# Patient Record
Sex: MALE | Race: WHITE | NOT HISPANIC OR LATINO | Employment: OTHER | URBAN - NONMETROPOLITAN AREA
[De-identification: names, ages, dates, MRNs, and addresses within clinical notes are randomized per-mention and may not be internally consistent; named-entity substitution may affect disease eponyms.]

---

## 2018-01-24 ENCOUNTER — DOCUMENTATION ONLY (OUTPATIENT)
Dept: FAMILY MEDICINE | Facility: OTHER | Age: 78
End: 2018-01-24

## 2018-01-24 RX ORDER — AMLODIPINE BESYLATE 10 MG/1
1 TABLET ORAL DAILY
COMMUNITY
Start: 2016-03-01 | End: 2024-01-01

## 2018-01-24 RX ORDER — ATORVASTATIN CALCIUM 20 MG/1
1 TABLET, FILM COATED ORAL DAILY
COMMUNITY
End: 2024-01-01

## 2018-01-24 RX ORDER — HYDROCHLOROTHIAZIDE 25 MG/1
1 TABLET ORAL DAILY
COMMUNITY
Start: 2015-04-01 | End: 2024-01-01

## 2018-01-24 RX ORDER — NITROGLYCERIN 0.4 MG/1
1 TABLET SUBLINGUAL EVERY 5 MIN PRN
COMMUNITY
Start: 2015-03-04 | End: 2024-01-01

## 2018-01-24 RX ORDER — OXYCODONE AND ACETAMINOPHEN 5; 325 MG/1; MG/1
1-2 TABLET ORAL EVERY 4 HOURS PRN
COMMUNITY
Start: 2006-03-24 | End: 2024-01-01

## 2018-01-24 RX ORDER — SPIRONOLACTONE 25 MG/1
1 TABLET ORAL DAILY
COMMUNITY
Start: 2015-03-04 | End: 2024-01-01

## 2018-01-24 RX ORDER — LISINOPRIL 40 MG/1
1 TABLET ORAL DAILY
COMMUNITY
End: 2024-01-01

## 2018-01-24 RX ORDER — WARFARIN SODIUM 2.5 MG/1
TABLET ORAL
COMMUNITY
Start: 2006-03-24 | End: 2024-01-01

## 2018-01-24 RX ORDER — ATENOLOL 100 MG/1
1 TABLET ORAL DAILY
COMMUNITY
End: 2024-01-01

## 2018-07-24 ENCOUNTER — TELEPHONE (OUTPATIENT)
Dept: INTERNAL MEDICINE | Facility: OTHER | Age: 78
End: 2018-07-24

## 2024-01-01 ENCOUNTER — APPOINTMENT (OUTPATIENT)
Dept: ULTRASOUND IMAGING | Facility: CLINIC | Age: 84
DRG: 682 | End: 2024-01-01
Attending: HOSPITALIST
Payer: MEDICARE

## 2024-01-01 ENCOUNTER — APPOINTMENT (OUTPATIENT)
Dept: PHYSICAL THERAPY | Facility: CLINIC | Age: 84
DRG: 378 | End: 2024-01-01
Attending: HOSPITALIST
Payer: MEDICARE

## 2024-01-01 ENCOUNTER — HEALTH MAINTENANCE LETTER (OUTPATIENT)
Age: 84
End: 2024-01-01

## 2024-01-01 ENCOUNTER — HOSPITAL ENCOUNTER (INPATIENT)
Facility: CLINIC | Age: 84
LOS: 2 days | Discharge: HOME | DRG: 378 | End: 2024-08-13
Attending: EMERGENCY MEDICINE | Admitting: INTERNAL MEDICINE
Payer: MEDICARE

## 2024-01-01 ENCOUNTER — HOSPITAL ENCOUNTER (INPATIENT)
Facility: CLINIC | Age: 84
LOS: 2 days | Discharge: HOME | DRG: 378 | End: 2024-02-05
Attending: EMERGENCY MEDICINE | Admitting: INTERNAL MEDICINE
Payer: MEDICARE

## 2024-01-01 ENCOUNTER — ANESTHESIA (OUTPATIENT)
Dept: SURGERY | Facility: CLINIC | Age: 84
DRG: 378 | End: 2024-01-01
Payer: MEDICARE

## 2024-01-01 ENCOUNTER — HOSPITAL ENCOUNTER (INPATIENT)
Facility: CLINIC | Age: 84
LOS: 3 days | Discharge: HOME | DRG: 378 | End: 2024-02-12
Attending: STUDENT IN AN ORGANIZED HEALTH CARE EDUCATION/TRAINING PROGRAM | Admitting: INTERNAL MEDICINE
Payer: MEDICARE

## 2024-01-01 ENCOUNTER — HOSPITAL ENCOUNTER (INPATIENT)
Facility: CLINIC | Age: 84
LOS: 5 days | Discharge: HOME | DRG: 682 | End: 2024-04-09
Attending: EMERGENCY MEDICINE | Admitting: STUDENT IN AN ORGANIZED HEALTH CARE EDUCATION/TRAINING PROGRAM
Payer: MEDICARE

## 2024-01-01 ENCOUNTER — APPOINTMENT (OUTPATIENT)
Dept: GENERAL RADIOLOGY | Facility: CLINIC | Age: 84
DRG: 378 | End: 2024-01-01
Attending: BEHAVIOR TECHNICIAN
Payer: MEDICARE

## 2024-01-01 ENCOUNTER — ANESTHESIA EVENT (OUTPATIENT)
Dept: SURGERY | Facility: CLINIC | Age: 84
DRG: 378 | End: 2024-01-01
Payer: MEDICARE

## 2024-01-01 ENCOUNTER — APPOINTMENT (OUTPATIENT)
Dept: PHYSICAL THERAPY | Facility: CLINIC | Age: 84
DRG: 378 | End: 2024-01-01
Payer: MEDICARE

## 2024-01-01 ENCOUNTER — APPOINTMENT (OUTPATIENT)
Dept: CT IMAGING | Facility: CLINIC | Age: 84
DRG: 378 | End: 2024-01-01
Attending: EMERGENCY MEDICINE
Payer: MEDICARE

## 2024-01-01 VITALS
HEIGHT: 68 IN | DIASTOLIC BLOOD PRESSURE: 81 MMHG | SYSTOLIC BLOOD PRESSURE: 148 MMHG | TEMPERATURE: 98.4 F | BODY MASS INDEX: 21.45 KG/M2 | OXYGEN SATURATION: 94 % | HEART RATE: 85 BPM | WEIGHT: 141.54 LBS | RESPIRATION RATE: 20 BRPM

## 2024-01-01 VITALS
TEMPERATURE: 98.9 F | SYSTOLIC BLOOD PRESSURE: 161 MMHG | WEIGHT: 151.36 LBS | DIASTOLIC BLOOD PRESSURE: 96 MMHG | HEART RATE: 88 BPM | OXYGEN SATURATION: 92 % | HEIGHT: 68 IN | BODY MASS INDEX: 22.94 KG/M2 | RESPIRATION RATE: 16 BRPM

## 2024-01-01 VITALS
TEMPERATURE: 97.9 F | HEIGHT: 72 IN | BODY MASS INDEX: 19.59 KG/M2 | DIASTOLIC BLOOD PRESSURE: 74 MMHG | RESPIRATION RATE: 15 BRPM | OXYGEN SATURATION: 94 % | SYSTOLIC BLOOD PRESSURE: 124 MMHG | WEIGHT: 144.62 LBS | HEART RATE: 87 BPM

## 2024-01-01 VITALS
OXYGEN SATURATION: 94 % | HEIGHT: 69 IN | RESPIRATION RATE: 18 BRPM | TEMPERATURE: 97.6 F | DIASTOLIC BLOOD PRESSURE: 65 MMHG | HEART RATE: 77 BPM | BODY MASS INDEX: 22.47 KG/M2 | SYSTOLIC BLOOD PRESSURE: 123 MMHG | WEIGHT: 151.68 LBS

## 2024-01-01 DIAGNOSIS — N13.9 URINARY OBSTRUCTION: ICD-10-CM

## 2024-01-01 DIAGNOSIS — D72.829 LEUKOCYTOSIS: ICD-10-CM

## 2024-01-01 DIAGNOSIS — K26.4 GASTROINTESTINAL HEMORRHAGE ASSOCIATED WITH DUODENAL ULCER: Primary | ICD-10-CM

## 2024-01-01 DIAGNOSIS — K92.2 ACUTE GI BLEEDING: ICD-10-CM

## 2024-01-01 DIAGNOSIS — R53.83 FATIGUE: ICD-10-CM

## 2024-01-01 DIAGNOSIS — I51.7 CARDIOMEGALY: ICD-10-CM

## 2024-01-01 DIAGNOSIS — D64.9 ANEMIA, UNSPECIFIED TYPE: ICD-10-CM

## 2024-01-01 DIAGNOSIS — R07.89 CHEST TIGHTNESS: ICD-10-CM

## 2024-01-01 DIAGNOSIS — K92.1 MELENA: ICD-10-CM

## 2024-01-01 DIAGNOSIS — E87.5 HYPERKALEMIA: ICD-10-CM

## 2024-01-01 DIAGNOSIS — K92.2 GASTROINTESTINAL HEMORRHAGE, UNSPECIFIED GASTROINTESTINAL HEMORRHAGE TYPE: ICD-10-CM

## 2024-01-01 DIAGNOSIS — N18.4 CKD (CHRONIC KIDNEY DISEASE) STAGE 4, GFR 15-29 ML/MIN (H): ICD-10-CM

## 2024-01-01 DIAGNOSIS — I48.20 CHRONIC ATRIAL FIBRILLATION (H): ICD-10-CM

## 2024-01-01 DIAGNOSIS — D62 ANEMIA DUE TO BLOOD LOSS, ACUTE: ICD-10-CM

## 2024-01-01 DIAGNOSIS — R79.89 ELEVATED TROPONIN: ICD-10-CM

## 2024-01-01 DIAGNOSIS — K92.1 MELENA: Primary | ICD-10-CM

## 2024-01-01 DIAGNOSIS — N17.9 AKI (ACUTE KIDNEY INJURY) (H): ICD-10-CM

## 2024-01-01 DIAGNOSIS — R26.89 BALANCE DISORDER: ICD-10-CM

## 2024-01-01 DIAGNOSIS — R79.89 ELEVATED SERUM CREATININE: ICD-10-CM

## 2024-01-01 LAB
ABO/RH(D): NORMAL
ADV 40+41 DNA STL QL NAA+NON-PROBE: NEGATIVE
ALBUMIN SERPL BCG-MCNC: 3.1 G/DL (ref 3.5–5.2)
ALBUMIN SERPL BCG-MCNC: 3.4 G/DL (ref 3.5–5.2)
ALBUMIN SERPL BCG-MCNC: 3.6 G/DL (ref 3.5–5.2)
ALBUMIN SERPL BCG-MCNC: 3.7 G/DL (ref 3.5–5.2)
ALBUMIN UR-MCNC: 10 MG/DL
ALBUMIN UR-MCNC: NEGATIVE MG/DL
ALP SERPL-CCNC: 47 U/L (ref 40–150)
ALP SERPL-CCNC: 73 U/L (ref 40–150)
ALP SERPL-CCNC: 76 U/L (ref 40–150)
ALT SERPL W P-5'-P-CCNC: 13 U/L (ref 0–70)
ALT SERPL W P-5'-P-CCNC: 13 U/L (ref 0–70)
ALT SERPL W P-5'-P-CCNC: 6 U/L (ref 0–70)
ANION GAP SERPL CALCULATED.3IONS-SCNC: 10 MMOL/L (ref 7–15)
ANION GAP SERPL CALCULATED.3IONS-SCNC: 11 MMOL/L (ref 7–15)
ANION GAP SERPL CALCULATED.3IONS-SCNC: 12 MMOL/L (ref 7–15)
ANION GAP SERPL CALCULATED.3IONS-SCNC: 13 MMOL/L (ref 7–15)
ANION GAP SERPL CALCULATED.3IONS-SCNC: 14 MMOL/L (ref 7–15)
ANION GAP SERPL CALCULATED.3IONS-SCNC: 16 MMOL/L (ref 7–15)
ANION GAP SERPL CALCULATED.3IONS-SCNC: 17 MMOL/L (ref 7–15)
ANION GAP SERPL CALCULATED.3IONS-SCNC: 19 MMOL/L (ref 7–15)
ANION GAP SERPL CALCULATED.3IONS-SCNC: 9 MMOL/L (ref 7–15)
ANION GAP SERPL CALCULATED.3IONS-SCNC: 9 MMOL/L (ref 7–15)
ANTIBODY SCREEN: NEGATIVE
APPEARANCE UR: ABNORMAL
APPEARANCE UR: CLEAR
APTT PPP: 26 SECONDS (ref 22–38)
AST SERPL W P-5'-P-CCNC: 10 U/L (ref 0–45)
AST SERPL W P-5'-P-CCNC: 12 U/L (ref 0–45)
AST SERPL W P-5'-P-CCNC: 13 U/L (ref 0–45)
ASTRO TYP 1-8 RNA STL QL NAA+NON-PROBE: NEGATIVE
ATRIAL RATE - MUSE: 76 BPM
ATRIAL RATE - MUSE: 84 BPM
ATRIAL RATE - MUSE: NORMAL BPM
BACTERIA #/AREA URNS HPF: ABNORMAL /HPF
BASOPHILS # BLD AUTO: 0 10E3/UL (ref 0–0.2)
BASOPHILS # BLD AUTO: 0.1 10E3/UL (ref 0–0.2)
BASOPHILS # BLD AUTO: 0.1 10E3/UL (ref 0–0.2)
BASOPHILS NFR BLD AUTO: 0 %
BASOPHILS NFR BLD AUTO: 1 %
BASOPHILS NFR BLD AUTO: 1 %
BILIRUB SERPL-MCNC: 0.3 MG/DL
BILIRUB SERPL-MCNC: 0.3 MG/DL
BILIRUB SERPL-MCNC: <0.2 MG/DL
BILIRUB UR QL STRIP: NEGATIVE
BILIRUB UR QL STRIP: NEGATIVE
BLD PROD TYP BPU: NORMAL
BLOOD COMPONENT TYPE: NORMAL
BUN SERPL-MCNC: 38.6 MG/DL (ref 8–23)
BUN SERPL-MCNC: 39.9 MG/DL (ref 8–23)
BUN SERPL-MCNC: 40.9 MG/DL (ref 8–23)
BUN SERPL-MCNC: 42.3 MG/DL (ref 8–23)
BUN SERPL-MCNC: 43.4 MG/DL (ref 8–23)
BUN SERPL-MCNC: 44 MG/DL (ref 8–23)
BUN SERPL-MCNC: 61.3 MG/DL (ref 8–23)
BUN SERPL-MCNC: 63.1 MG/DL (ref 8–23)
BUN SERPL-MCNC: 64.6 MG/DL (ref 8–23)
BUN SERPL-MCNC: 66.4 MG/DL (ref 8–23)
BUN SERPL-MCNC: 67.5 MG/DL (ref 8–23)
BUN SERPL-MCNC: 71 MG/DL (ref 8–23)
BUN SERPL-MCNC: 76.1 MG/DL (ref 8–23)
C CAYETANENSIS DNA STL QL NAA+NON-PROBE: NEGATIVE
C DIFF TOX B STL QL: NEGATIVE
CALCIUM SERPL-MCNC: 8 MG/DL (ref 8.8–10.2)
CALCIUM SERPL-MCNC: 8 MG/DL (ref 8.8–10.2)
CALCIUM SERPL-MCNC: 8.2 MG/DL (ref 8.8–10.4)
CALCIUM SERPL-MCNC: 8.3 MG/DL (ref 8.8–10.2)
CALCIUM SERPL-MCNC: 8.3 MG/DL (ref 8.8–10.2)
CALCIUM SERPL-MCNC: 8.3 MG/DL (ref 8.8–10.4)
CALCIUM SERPL-MCNC: 8.4 MG/DL (ref 8.8–10.2)
CALCIUM SERPL-MCNC: 8.6 MG/DL (ref 8.8–10.4)
CALCIUM SERPL-MCNC: 8.8 MG/DL (ref 8.8–10.2)
CALCIUM SERPL-MCNC: 9 MG/DL (ref 8.8–10.2)
CALCIUM SERPL-MCNC: 9.1 MG/DL (ref 8.8–10.2)
CALCIUM SERPL-MCNC: 9.2 MG/DL (ref 8.8–10.2)
CALCIUM SERPL-MCNC: 9.4 MG/DL (ref 8.8–10.2)
CAMPYLOBACTER DNA SPEC NAA+PROBE: NEGATIVE
CHLORIDE SERPL-SCNC: 102 MMOL/L (ref 98–107)
CHLORIDE SERPL-SCNC: 102 MMOL/L (ref 98–107)
CHLORIDE SERPL-SCNC: 103 MMOL/L (ref 98–107)
CHLORIDE SERPL-SCNC: 104 MMOL/L (ref 98–107)
CHLORIDE SERPL-SCNC: 106 MMOL/L (ref 98–107)
CHLORIDE SERPL-SCNC: 106 MMOL/L (ref 98–107)
CHLORIDE SERPL-SCNC: 107 MMOL/L (ref 98–107)
CHLORIDE SERPL-SCNC: 108 MMOL/L (ref 98–107)
CHLORIDE SERPL-SCNC: 109 MMOL/L (ref 98–107)
CHLORIDE SERPL-SCNC: 111 MMOL/L (ref 98–107)
CHLORIDE SERPL-SCNC: 112 MMOL/L (ref 98–107)
CODING SYSTEM: NORMAL
COLOR UR AUTO: ABNORMAL
COLOR UR AUTO: ABNORMAL
COPPER SERPL-MCNC: 146.9 UG/DL
CREAT BLD-MCNC: 1.9 MG/DL (ref 0.7–1.3)
CREAT SERPL-MCNC: 1.28 MG/DL (ref 0.67–1.17)
CREAT SERPL-MCNC: 1.44 MG/DL (ref 0.67–1.17)
CREAT SERPL-MCNC: 1.66 MG/DL (ref 0.67–1.17)
CREAT SERPL-MCNC: 1.67 MG/DL (ref 0.67–1.17)
CREAT SERPL-MCNC: 2.3 MG/DL (ref 0.67–1.17)
CREAT SERPL-MCNC: 2.35 MG/DL (ref 0.67–1.17)
CREAT SERPL-MCNC: 2.5 MG/DL (ref 0.67–1.17)
CREAT SERPL-MCNC: 5.46 MG/DL (ref 0.67–1.17)
CREAT SERPL-MCNC: 5.7 MG/DL (ref 0.67–1.17)
CREAT SERPL-MCNC: 6.09 MG/DL (ref 0.67–1.17)
CREAT SERPL-MCNC: 6.12 MG/DL (ref 0.67–1.17)
CREAT SERPL-MCNC: 6.21 MG/DL (ref 0.67–1.17)
CREAT SERPL-MCNC: 6.22 MG/DL (ref 0.67–1.17)
CREAT SERPL-MCNC: 6.54 MG/DL (ref 0.67–1.17)
CREAT UR-MCNC: 43.8 MG/DL
CROSSMATCH: NORMAL
CRYPTOSP DNA STL QL NAA+NON-PROBE: NEGATIVE
DEPRECATED HCO3 PLAS-SCNC: 16 MMOL/L (ref 22–29)
DEPRECATED HCO3 PLAS-SCNC: 17 MMOL/L (ref 22–29)
DEPRECATED HCO3 PLAS-SCNC: 17 MMOL/L (ref 22–29)
DEPRECATED HCO3 PLAS-SCNC: 18 MMOL/L (ref 22–29)
DEPRECATED HCO3 PLAS-SCNC: 19 MMOL/L (ref 22–29)
DEPRECATED HCO3 PLAS-SCNC: 23 MMOL/L (ref 22–29)
DEPRECATED HCO3 PLAS-SCNC: 25 MMOL/L (ref 22–29)
DIASTOLIC BLOOD PRESSURE - MUSE: NORMAL MMHG
E COLI O157 DNA STL QL NAA+NON-PROBE: ABNORMAL
E HISTOLYT DNA STL QL NAA+NON-PROBE: NEGATIVE
EAEC ASTA GENE ISLT QL NAA+PROBE: NEGATIVE
EC STX1+STX2 GENES STL QL NAA+NON-PROBE: NEGATIVE
EGFRCR SERPLBLD CKD-EPI 2021: 10 ML/MIN/1.73M2
EGFRCR SERPLBLD CKD-EPI 2021: 25 ML/MIN/1.73M2
EGFRCR SERPLBLD CKD-EPI 2021: 26 ML/MIN/1.73M2
EGFRCR SERPLBLD CKD-EPI 2021: 27 ML/MIN/1.73M2
EGFRCR SERPLBLD CKD-EPI 2021: 34 ML/MIN/1.73M2
EGFRCR SERPLBLD CKD-EPI 2021: 40 ML/MIN/1.73M2
EGFRCR SERPLBLD CKD-EPI 2021: 40 ML/MIN/1.73M2
EGFRCR SERPLBLD CKD-EPI 2021: 48 ML/MIN/1.73M2
EGFRCR SERPLBLD CKD-EPI 2021: 55 ML/MIN/1.73M2
EGFRCR SERPLBLD CKD-EPI 2021: 8 ML/MIN/1.73M2
EGFRCR SERPLBLD CKD-EPI 2021: 9 ML/MIN/1.73M2
EOSINOPHIL # BLD AUTO: 0 10E3/UL (ref 0–0.7)
EOSINOPHIL # BLD AUTO: 0.1 10E3/UL (ref 0–0.7)
EOSINOPHIL NFR BLD AUTO: 0 %
EOSINOPHIL SPEC QL WRIGHT STN: NORMAL
EPEC EAE GENE STL QL NAA+NON-PROBE: NEGATIVE
ERYTHROCYTE [DISTWIDTH] IN BLOOD BY AUTOMATED COUNT: 14.3 % (ref 10–15)
ERYTHROCYTE [DISTWIDTH] IN BLOOD BY AUTOMATED COUNT: 14.6 % (ref 10–15)
ERYTHROCYTE [DISTWIDTH] IN BLOOD BY AUTOMATED COUNT: 14.9 % (ref 10–15)
ERYTHROCYTE [DISTWIDTH] IN BLOOD BY AUTOMATED COUNT: 15 % (ref 10–15)
ERYTHROCYTE [DISTWIDTH] IN BLOOD BY AUTOMATED COUNT: 15.5 % (ref 10–15)
ERYTHROCYTE [DISTWIDTH] IN BLOOD BY AUTOMATED COUNT: 15.6 % (ref 10–15)
ERYTHROCYTE [DISTWIDTH] IN BLOOD BY AUTOMATED COUNT: 15.7 % (ref 10–15)
ERYTHROCYTE [DISTWIDTH] IN BLOOD BY AUTOMATED COUNT: 15.9 % (ref 10–15)
ERYTHROCYTE [DISTWIDTH] IN BLOOD BY AUTOMATED COUNT: 16.1 % (ref 10–15)
ERYTHROCYTE [DISTWIDTH] IN BLOOD BY AUTOMATED COUNT: 16.6 % (ref 10–15)
ERYTHROCYTE [DISTWIDTH] IN BLOOD BY AUTOMATED COUNT: 17.5 % (ref 10–15)
ETEC LTA+ST1A+ST1B TOX ST NAA+NON-PROBE: NEGATIVE
FLUAV RNA SPEC QL NAA+PROBE: NEGATIVE
FLUBV RNA RESP QL NAA+PROBE: NEGATIVE
FOLATE SERPL-MCNC: 10.7 NG/ML (ref 4.6–34.8)
FRACT EXCRET NA UR+SERPL-RTO: 6.1 %
G LAMBLIA DNA STL QL NAA+NON-PROBE: NEGATIVE
GLUCOSE BLDC GLUCOMTR-MCNC: 100 MG/DL (ref 70–99)
GLUCOSE BLDC GLUCOMTR-MCNC: 103 MG/DL (ref 70–99)
GLUCOSE BLDC GLUCOMTR-MCNC: 120 MG/DL (ref 70–99)
GLUCOSE BLDC GLUCOMTR-MCNC: 170 MG/DL (ref 70–99)
GLUCOSE BLDC GLUCOMTR-MCNC: 77 MG/DL (ref 70–99)
GLUCOSE BLDC GLUCOMTR-MCNC: 80 MG/DL (ref 70–99)
GLUCOSE BLDC GLUCOMTR-MCNC: 86 MG/DL (ref 70–99)
GLUCOSE BLDC GLUCOMTR-MCNC: 87 MG/DL (ref 70–99)
GLUCOSE BLDC GLUCOMTR-MCNC: 87 MG/DL (ref 70–99)
GLUCOSE BLDC GLUCOMTR-MCNC: 89 MG/DL (ref 70–99)
GLUCOSE SERPL-MCNC: 101 MG/DL (ref 70–99)
GLUCOSE SERPL-MCNC: 102 MG/DL (ref 70–99)
GLUCOSE SERPL-MCNC: 107 MG/DL (ref 70–99)
GLUCOSE SERPL-MCNC: 122 MG/DL (ref 70–99)
GLUCOSE SERPL-MCNC: 123 MG/DL (ref 70–99)
GLUCOSE SERPL-MCNC: 172 MG/DL (ref 70–99)
GLUCOSE SERPL-MCNC: 83 MG/DL (ref 70–99)
GLUCOSE SERPL-MCNC: 88 MG/DL (ref 70–99)
GLUCOSE SERPL-MCNC: 89 MG/DL (ref 70–99)
GLUCOSE SERPL-MCNC: 90 MG/DL (ref 70–99)
GLUCOSE SERPL-MCNC: 91 MG/DL (ref 70–99)
GLUCOSE SERPL-MCNC: 95 MG/DL (ref 70–99)
GLUCOSE SERPL-MCNC: 97 MG/DL (ref 70–99)
GLUCOSE UR STRIP-MCNC: NEGATIVE MG/DL
GLUCOSE UR STRIP-MCNC: NEGATIVE MG/DL
HCO3 SERPL-SCNC: 19 MMOL/L (ref 22–29)
HCO3 SERPL-SCNC: 21 MMOL/L (ref 22–29)
HCO3 SERPL-SCNC: 23 MMOL/L (ref 22–29)
HCT VFR BLD AUTO: 21.9 % (ref 40–53)
HCT VFR BLD AUTO: 23.8 % (ref 40–53)
HCT VFR BLD AUTO: 24.3 % (ref 40–53)
HCT VFR BLD AUTO: 25.8 % (ref 40–53)
HCT VFR BLD AUTO: 26 % (ref 40–53)
HCT VFR BLD AUTO: 26.1 % (ref 40–53)
HCT VFR BLD AUTO: 28 % (ref 40–53)
HCT VFR BLD AUTO: 29.2 % (ref 40–53)
HCT VFR BLD AUTO: 29.6 % (ref 40–53)
HCT VFR BLD AUTO: 30.3 % (ref 40–53)
HCT VFR BLD AUTO: 31.7 % (ref 40–53)
HGB BLD-MCNC: 6.8 G/DL (ref 13.3–17.7)
HGB BLD-MCNC: 6.9 G/DL (ref 13.3–17.7)
HGB BLD-MCNC: 7.1 G/DL (ref 13.3–17.7)
HGB BLD-MCNC: 7.3 G/DL (ref 13.3–17.7)
HGB BLD-MCNC: 7.5 G/DL (ref 13.3–17.7)
HGB BLD-MCNC: 7.5 G/DL (ref 13.3–17.7)
HGB BLD-MCNC: 7.6 G/DL (ref 13.3–17.7)
HGB BLD-MCNC: 7.7 G/DL (ref 13.3–17.7)
HGB BLD-MCNC: 7.8 G/DL (ref 13.3–17.7)
HGB BLD-MCNC: 7.9 G/DL (ref 13.3–17.7)
HGB BLD-MCNC: 8 G/DL (ref 13.3–17.7)
HGB BLD-MCNC: 8 G/DL (ref 13.3–17.7)
HGB BLD-MCNC: 8.1 G/DL (ref 13.3–17.7)
HGB BLD-MCNC: 8.1 G/DL (ref 13.3–17.7)
HGB BLD-MCNC: 8.4 G/DL (ref 13.3–17.7)
HGB BLD-MCNC: 8.5 G/DL (ref 13.3–17.7)
HGB BLD-MCNC: 8.5 G/DL (ref 13.3–17.7)
HGB BLD-MCNC: 8.6 G/DL (ref 13.3–17.7)
HGB BLD-MCNC: 8.7 G/DL (ref 13.3–17.7)
HGB BLD-MCNC: 8.8 G/DL (ref 13.3–17.7)
HGB BLD-MCNC: 8.9 G/DL (ref 13.3–17.7)
HGB BLD-MCNC: 8.9 G/DL (ref 13.3–17.7)
HGB BLD-MCNC: 9 G/DL (ref 13.3–17.7)
HGB BLD-MCNC: 9.1 G/DL (ref 13.3–17.7)
HGB BLD-MCNC: 9.2 G/DL (ref 13.3–17.7)
HGB BLD-MCNC: 9.2 G/DL (ref 13.3–17.7)
HGB BLD-MCNC: 9.6 G/DL (ref 13.3–17.7)
HGB UR QL STRIP: ABNORMAL
HGB UR QL STRIP: NEGATIVE
HOLD SPECIMEN: NORMAL
IMM GRANULOCYTES # BLD: 0.1 10E3/UL
IMM GRANULOCYTES # BLD: 0.2 10E3/UL
IMM GRANULOCYTES # BLD: 0.2 10E3/UL
IMM GRANULOCYTES # BLD: 0.3 10E3/UL
IMM GRANULOCYTES # BLD: 0.4 10E3/UL
IMM GRANULOCYTES NFR BLD: 1 %
IMM GRANULOCYTES NFR BLD: 2 %
IMM GRANULOCYTES NFR BLD: 3 %
INR PPP: 0.95 (ref 0.85–1.15)
INR PPP: 1.18 (ref 0.85–1.15)
INTERPRETATION ECG - MUSE: NORMAL
ISSUE DATE AND TIME: NORMAL
KETONES UR STRIP-MCNC: NEGATIVE MG/DL
KETONES UR STRIP-MCNC: NEGATIVE MG/DL
LACTATE SERPL-SCNC: 1 MMOL/L (ref 0.7–2)
LACTATE SERPL-SCNC: 1.5 MMOL/L (ref 0.7–2)
LACTATE SERPL-SCNC: 2.6 MMOL/L (ref 0.7–2)
LACTATE SERPL-SCNC: 3.4 MMOL/L (ref 0.7–2)
LEUKOCYTE ESTERASE UR QL STRIP: ABNORMAL
LEUKOCYTE ESTERASE UR QL STRIP: NEGATIVE
LYMPHOCYTES # BLD AUTO: 0.5 10E3/UL (ref 0.8–5.3)
LYMPHOCYTES # BLD AUTO: 0.6 10E3/UL (ref 0.8–5.3)
LYMPHOCYTES # BLD AUTO: 0.7 10E3/UL (ref 0.8–5.3)
LYMPHOCYTES # BLD AUTO: 0.7 10E3/UL (ref 0.8–5.3)
LYMPHOCYTES # BLD AUTO: 0.8 10E3/UL (ref 0.8–5.3)
LYMPHOCYTES NFR BLD AUTO: 4 %
LYMPHOCYTES NFR BLD AUTO: 7 %
LYMPHOCYTES NFR BLD AUTO: 7 %
MAGNESIUM SERPL-MCNC: 1.7 MG/DL (ref 1.7–2.3)
MAGNESIUM SERPL-MCNC: 1.8 MG/DL (ref 1.7–2.3)
MAGNESIUM SERPL-MCNC: 1.9 MG/DL (ref 1.7–2.3)
MAGNESIUM SERPL-MCNC: 2 MG/DL (ref 1.7–2.3)
MAGNESIUM SERPL-MCNC: 2.1 MG/DL (ref 1.7–2.3)
MAGNESIUM SERPL-MCNC: 2.2 MG/DL (ref 1.7–2.3)
MAGNESIUM SERPL-MCNC: 2.3 MG/DL (ref 1.7–2.3)
MCH RBC QN AUTO: 25.6 PG (ref 26.5–33)
MCH RBC QN AUTO: 25.6 PG (ref 26.5–33)
MCH RBC QN AUTO: 25.8 PG (ref 26.5–33)
MCH RBC QN AUTO: 26.2 PG (ref 26.5–33)
MCH RBC QN AUTO: 27 PG (ref 26.5–33)
MCH RBC QN AUTO: 27.9 PG (ref 26.5–33)
MCH RBC QN AUTO: 27.9 PG (ref 26.5–33)
MCH RBC QN AUTO: 30 PG (ref 26.5–33)
MCH RBC QN AUTO: 30.2 PG (ref 26.5–33)
MCH RBC QN AUTO: 30.6 PG (ref 26.5–33)
MCH RBC QN AUTO: 31 PG (ref 26.5–33)
MCHC RBC AUTO-ENTMCNC: 29 G/DL (ref 31.5–36.5)
MCHC RBC AUTO-ENTMCNC: 29.4 G/DL (ref 31.5–36.5)
MCHC RBC AUTO-ENTMCNC: 29.4 G/DL (ref 31.5–36.5)
MCHC RBC AUTO-ENTMCNC: 29.6 G/DL (ref 31.5–36.5)
MCHC RBC AUTO-ENTMCNC: 30 G/DL (ref 31.5–36.5)
MCHC RBC AUTO-ENTMCNC: 30.3 G/DL (ref 31.5–36.5)
MCHC RBC AUTO-ENTMCNC: 30.6 G/DL (ref 31.5–36.5)
MCHC RBC AUTO-ENTMCNC: 30.7 G/DL (ref 31.5–36.5)
MCHC RBC AUTO-ENTMCNC: 31.5 G/DL (ref 31.5–36.5)
MCHC RBC AUTO-ENTMCNC: 31.5 G/DL (ref 31.5–36.5)
MCHC RBC AUTO-ENTMCNC: 31.7 G/DL (ref 31.5–36.5)
MCV RBC AUTO: 86 FL (ref 78–100)
MCV RBC AUTO: 86 FL (ref 78–100)
MCV RBC AUTO: 87 FL (ref 78–100)
MCV RBC AUTO: 87 FL (ref 78–100)
MCV RBC AUTO: 91 FL (ref 78–100)
MCV RBC AUTO: 93 FL (ref 78–100)
MCV RBC AUTO: 95 FL (ref 78–100)
MCV RBC AUTO: 95 FL (ref 78–100)
MCV RBC AUTO: 97 FL (ref 78–100)
MCV RBC AUTO: 98 FL (ref 78–100)
MCV RBC AUTO: 99 FL (ref 78–100)
MONOCYTES # BLD AUTO: 0.7 10E3/UL (ref 0–1.3)
MONOCYTES # BLD AUTO: 0.8 10E3/UL (ref 0–1.3)
MONOCYTES # BLD AUTO: 0.8 10E3/UL (ref 0–1.3)
MONOCYTES # BLD AUTO: 0.9 10E3/UL (ref 0–1.3)
MONOCYTES # BLD AUTO: 1 10E3/UL (ref 0–1.3)
MONOCYTES NFR BLD AUTO: 10 %
MONOCYTES NFR BLD AUTO: 5 %
MONOCYTES NFR BLD AUTO: 5 %
MONOCYTES NFR BLD AUTO: 6 %
MONOCYTES NFR BLD AUTO: 8 %
MUCOUS THREADS #/AREA URNS LPF: PRESENT /LPF
MUCOUS THREADS #/AREA URNS LPF: PRESENT /LPF
NEUTROPHILS # BLD AUTO: 10.5 10E3/UL (ref 1.6–8.3)
NEUTROPHILS # BLD AUTO: 13.2 10E3/UL (ref 1.6–8.3)
NEUTROPHILS # BLD AUTO: 13.7 10E3/UL (ref 1.6–8.3)
NEUTROPHILS # BLD AUTO: 7.9 10E3/UL (ref 1.6–8.3)
NEUTROPHILS # BLD AUTO: 9.7 10E3/UL (ref 1.6–8.3)
NEUTROPHILS NFR BLD AUTO: 81 %
NEUTROPHILS NFR BLD AUTO: 84 %
NEUTROPHILS NFR BLD AUTO: 86 %
NEUTROPHILS NFR BLD AUTO: 88 %
NEUTROPHILS NFR BLD AUTO: 89 %
NITRATE UR QL: NEGATIVE
NITRATE UR QL: NEGATIVE
NOROVIRUS GI+II RNA STL QL NAA+NON-PROBE: POSITIVE
NRBC # BLD AUTO: 0 10E3/UL
NRBC BLD AUTO-RTO: 0 /100
NT-PROBNP SERPL-MCNC: 1671 PG/ML (ref 0–1800)
P AXIS - MUSE: NORMAL DEGREES
P SHIGELLOIDES DNA STL QL NAA+NON-PROBE: NEGATIVE
PH UR STRIP: 5 [PH] (ref 5–7)
PH UR STRIP: 6 [PH] (ref 5–7)
PHOSPHATE SERPL-MCNC: 4.7 MG/DL (ref 2.5–4.5)
PHOSPHATE SERPL-MCNC: 5.7 MG/DL (ref 2.5–4.5)
PHOSPHATE SERPL-MCNC: 5.7 MG/DL (ref 2.5–4.5)
PLATELET # BLD AUTO: 121 10E3/UL (ref 150–450)
PLATELET # BLD AUTO: 162 10E3/UL (ref 150–450)
PLATELET # BLD AUTO: 170 10E3/UL (ref 150–450)
PLATELET # BLD AUTO: 180 10E3/UL (ref 150–450)
PLATELET # BLD AUTO: 206 10E3/UL (ref 150–450)
PLATELET # BLD AUTO: 235 10E3/UL (ref 150–450)
PLATELET # BLD AUTO: 258 10E3/UL (ref 150–450)
PLATELET # BLD AUTO: 292 10E3/UL (ref 150–450)
PLATELET # BLD AUTO: 325 10E3/UL (ref 150–450)
PLATELET # BLD AUTO: 373 10E3/UL (ref 150–450)
PLATELET # BLD AUTO: 440 10E3/UL (ref 150–450)
POTASSIUM SERPL-SCNC: 3.7 MMOL/L (ref 3.4–5.3)
POTASSIUM SERPL-SCNC: 3.8 MMOL/L (ref 3.4–5.3)
POTASSIUM SERPL-SCNC: 4 MMOL/L (ref 3.4–5.3)
POTASSIUM SERPL-SCNC: 4.1 MMOL/L (ref 3.4–5.3)
POTASSIUM SERPL-SCNC: 4.1 MMOL/L (ref 3.4–5.3)
POTASSIUM SERPL-SCNC: 4.2 MMOL/L (ref 3.4–5.3)
POTASSIUM SERPL-SCNC: 4.3 MMOL/L (ref 3.4–5.3)
POTASSIUM SERPL-SCNC: 4.4 MMOL/L (ref 3.4–5.3)
POTASSIUM SERPL-SCNC: 4.8 MMOL/L (ref 3.4–5.3)
POTASSIUM SERPL-SCNC: 4.9 MMOL/L (ref 3.4–5.3)
POTASSIUM SERPL-SCNC: 5.1 MMOL/L (ref 3.4–5.3)
POTASSIUM SERPL-SCNC: 5.1 MMOL/L (ref 3.4–5.3)
POTASSIUM SERPL-SCNC: 5.2 MMOL/L (ref 3.4–5.3)
POTASSIUM SERPL-SCNC: 5.3 MMOL/L (ref 3.4–5.3)
POTASSIUM SERPL-SCNC: 5.4 MMOL/L (ref 3.4–5.3)
POTASSIUM SERPL-SCNC: 5.5 MMOL/L (ref 3.4–5.3)
POTASSIUM SERPL-SCNC: 5.7 MMOL/L (ref 3.4–5.3)
POTASSIUM SERPL-SCNC: 5.8 MMOL/L (ref 3.4–5.3)
POTASSIUM SERPL-SCNC: 5.8 MMOL/L (ref 3.4–5.3)
PR INTERVAL - MUSE: NORMAL MS
PROCALCITONIN SERPL IA-MCNC: 0.49 NG/ML
PROT SERPL-MCNC: 5.4 G/DL (ref 6.4–8.3)
PROT SERPL-MCNC: 5.5 G/DL (ref 6.4–8.3)
PROT SERPL-MCNC: 5.9 G/DL (ref 6.4–8.3)
PYRIDOXAL PHOS SERPL-SCNC: 27 NMOL/L
QRS DURATION - MUSE: 90 MS
QRS DURATION - MUSE: 94 MS
QRS DURATION - MUSE: 96 MS
QRS DURATION - MUSE: 96 MS
QRS DURATION - MUSE: 98 MS
QT - MUSE: 368 MS
QT - MUSE: 376 MS
QT - MUSE: 384 MS
QT - MUSE: 384 MS
QT - MUSE: 406 MS
QTC - MUSE: 414 MS
QTC - MUSE: 439 MS
QTC - MUSE: 445 MS
QTC - MUSE: 448 MS
QTC - MUSE: 479 MS
R AXIS - MUSE: 15 DEGREES
R AXIS - MUSE: 19 DEGREES
R AXIS - MUSE: 24 DEGREES
R AXIS - MUSE: 26 DEGREES
R AXIS - MUSE: 28 DEGREES
RBC # BLD AUTO: 2.3 10E6/UL (ref 4.4–5.9)
RBC # BLD AUTO: 2.48 10E6/UL (ref 4.4–5.9)
RBC # BLD AUTO: 2.56 10E6/UL (ref 4.4–5.9)
RBC # BLD AUTO: 2.65 10E6/UL (ref 4.4–5.9)
RBC # BLD AUTO: 2.83 10E6/UL (ref 4.4–5.9)
RBC # BLD AUTO: 3.01 10E6/UL (ref 4.4–5.9)
RBC # BLD AUTO: 3.01 10E6/UL (ref 4.4–5.9)
RBC # BLD AUTO: 3.12 10E6/UL (ref 4.4–5.9)
RBC # BLD AUTO: 3.26 10E6/UL (ref 4.4–5.9)
RBC # BLD AUTO: 3.48 10E6/UL (ref 4.4–5.9)
RBC # BLD AUTO: 3.66 10E6/UL (ref 4.4–5.9)
RBC URINE: 4 /HPF
RBC URINE: 5 /HPF
RSV RNA SPEC NAA+PROBE: NEGATIVE
RVA RNA STL QL NAA+NON-PROBE: NEGATIVE
SALMONELLA SP RPOD STL QL NAA+PROBE: NEGATIVE
SAPO I+II+IV+V RNA STL QL NAA+NON-PROBE: NEGATIVE
SARS-COV-2 RNA RESP QL NAA+PROBE: NEGATIVE
SHIGELLA SP+EIEC IPAH ST NAA+NON-PROBE: NEGATIVE
SODIUM SERPL-SCNC: 136 MMOL/L (ref 135–145)
SODIUM SERPL-SCNC: 137 MMOL/L (ref 135–145)
SODIUM SERPL-SCNC: 138 MMOL/L (ref 135–145)
SODIUM SERPL-SCNC: 139 MMOL/L (ref 135–145)
SODIUM SERPL-SCNC: 139 MMOL/L (ref 135–145)
SODIUM SERPL-SCNC: 141 MMOL/L (ref 135–145)
SODIUM SERPL-SCNC: 142 MMOL/L (ref 135–145)
SODIUM SERPL-SCNC: 143 MMOL/L (ref 135–145)
SODIUM UR-SCNC: 65 MMOL/L
SP GR UR STRIP: 1.01 (ref 1–1.03)
SP GR UR STRIP: 1.03 (ref 1–1.03)
SPECIMEN EXPIRATION DATE: NORMAL
SQUAMOUS EPITHELIAL: <1 /HPF
SYSTOLIC BLOOD PRESSURE - MUSE: NORMAL MMHG
T AXIS - MUSE: 105 DEGREES
T AXIS - MUSE: 108 DEGREES
T AXIS - MUSE: 55 DEGREES
T AXIS - MUSE: 64 DEGREES
T AXIS - MUSE: 97 DEGREES
TROPONIN T SERPL HS-MCNC: 35 NG/L
TROPONIN T SERPL HS-MCNC: 42 NG/L
TROPONIN T SERPL HS-MCNC: 62 NG/L
TROPONIN T SERPL HS-MCNC: 65 NG/L
UNIT ABO/RH: NORMAL
UNIT NUMBER: NORMAL
UNIT STATUS: NORMAL
UNIT TYPE ISBT: 5100
UPPER GI ENDOSCOPY: NORMAL
UPPER GI ENDOSCOPY: NORMAL
UROBILINOGEN UR STRIP-MCNC: NORMAL MG/DL
UROBILINOGEN UR STRIP-MCNC: NORMAL MG/DL
V CHOLERAE DNA SPEC QL NAA+PROBE: NEGATIVE
VENTRICULAR RATE- MUSE: 70 BPM
VENTRICULAR RATE- MUSE: 82 BPM
VENTRICULAR RATE- MUSE: 82 BPM
VENTRICULAR RATE- MUSE: 84 BPM
VENTRICULAR RATE- MUSE: 88 BPM
VIBRIO DNA SPEC NAA+PROBE: NEGATIVE
VIT B12 SERPL-MCNC: 505 PG/ML (ref 232–1245)
VIT D+METAB SERPL-MCNC: 17 NG/ML (ref 20–50)
WBC # BLD AUTO: 10.5 10E3/UL (ref 4–11)
WBC # BLD AUTO: 10.8 10E3/UL (ref 4–11)
WBC # BLD AUTO: 11.5 10E3/UL (ref 4–11)
WBC # BLD AUTO: 12 10E3/UL (ref 4–11)
WBC # BLD AUTO: 12.2 10E3/UL (ref 4–11)
WBC # BLD AUTO: 12.2 10E3/UL (ref 4–11)
WBC # BLD AUTO: 13.5 10E3/UL (ref 4–11)
WBC # BLD AUTO: 15.1 10E3/UL (ref 4–11)
WBC # BLD AUTO: 15.6 10E3/UL (ref 4–11)
WBC # BLD AUTO: 16.9 10E3/UL (ref 4–11)
WBC # BLD AUTO: 9.6 10E3/UL (ref 4–11)
WBC URINE: 1 /HPF
WBC URINE: 115 /HPF
Y ENTEROCOL DNA STL QL NAA+PROBE: NEGATIVE
ZINC SERPL-MCNC: 65.7 UG/DL

## 2024-01-01 PROCEDURE — 84132 ASSAY OF SERUM POTASSIUM: CPT | Performed by: INTERNAL MEDICINE

## 2024-01-01 PROCEDURE — 250N000011 HC RX IP 250 OP 636: Performed by: EMERGENCY MEDICINE

## 2024-01-01 PROCEDURE — 258N000003 HC RX IP 258 OP 636: Performed by: PHYSICIAN ASSISTANT

## 2024-01-01 PROCEDURE — 36415 COLL VENOUS BLD VENIPUNCTURE: CPT | Performed by: HOSPITALIST

## 2024-01-01 PROCEDURE — 85025 COMPLETE CBC W/AUTO DIFF WBC: CPT | Performed by: HOSPITALIST

## 2024-01-01 PROCEDURE — 83735 ASSAY OF MAGNESIUM: CPT | Performed by: HOSPITALIST

## 2024-01-01 PROCEDURE — 86923 COMPATIBILITY TEST ELECTRIC: CPT | Performed by: EMERGENCY MEDICINE

## 2024-01-01 PROCEDURE — 85018 HEMOGLOBIN: CPT | Performed by: INTERNAL MEDICINE

## 2024-01-01 PROCEDURE — 250N000013 HC RX MED GY IP 250 OP 250 PS 637: Performed by: HOSPITALIST

## 2024-01-01 PROCEDURE — 250N000009 HC RX 250: Performed by: INTERNAL MEDICINE

## 2024-01-01 PROCEDURE — 258N000003 HC RX IP 258 OP 636: Performed by: EMERGENCY MEDICINE

## 2024-01-01 PROCEDURE — 99233 SBSQ HOSP IP/OBS HIGH 50: CPT | Performed by: HOSPITALIST

## 2024-01-01 PROCEDURE — 82565 ASSAY OF CREATININE: CPT | Performed by: INTERNAL MEDICINE

## 2024-01-01 PROCEDURE — 85018 HEMOGLOBIN: CPT | Performed by: PHYSICIAN ASSISTANT

## 2024-01-01 PROCEDURE — 250N000013 HC RX MED GY IP 250 OP 250 PS 637: Performed by: STUDENT IN AN ORGANIZED HEALTH CARE EDUCATION/TRAINING PROGRAM

## 2024-01-01 PROCEDURE — 120N000001 HC R&B MED SURG/OB

## 2024-01-01 PROCEDURE — C9113 INJ PANTOPRAZOLE SODIUM, VIA: HCPCS | Performed by: INTERNAL MEDICINE

## 2024-01-01 PROCEDURE — 36415 COLL VENOUS BLD VENIPUNCTURE: CPT | Performed by: BEHAVIOR TECHNICIAN

## 2024-01-01 PROCEDURE — XW0G886 INTRODUCTION OF MINERAL-BASED TOPICAL HEMOSTATIC AGENT INTO UPPER GI, VIA NATURAL OR ARTIFICIAL OPENING ENDOSCOPIC, NEW TECHNOLOGY GROUP 6: ICD-10-PCS | Performed by: INTERNAL MEDICINE

## 2024-01-01 PROCEDURE — 99233 SBSQ HOSP IP/OBS HIGH 50: CPT | Performed by: INTERNAL MEDICINE

## 2024-01-01 PROCEDURE — 82040 ASSAY OF SERUM ALBUMIN: CPT | Performed by: STUDENT IN AN ORGANIZED HEALTH CARE EDUCATION/TRAINING PROGRAM

## 2024-01-01 PROCEDURE — 36415 COLL VENOUS BLD VENIPUNCTURE: CPT | Performed by: INTERNAL MEDICINE

## 2024-01-01 PROCEDURE — 36415 COLL VENOUS BLD VENIPUNCTURE: CPT | Performed by: PHYSICIAN ASSISTANT

## 2024-01-01 PROCEDURE — 80048 BASIC METABOLIC PNL TOTAL CA: CPT | Performed by: PHYSICIAN ASSISTANT

## 2024-01-01 PROCEDURE — 83735 ASSAY OF MAGNESIUM: CPT | Performed by: INTERNAL MEDICINE

## 2024-01-01 PROCEDURE — C9113 INJ PANTOPRAZOLE SODIUM, VIA: HCPCS | Performed by: PHYSICIAN ASSISTANT

## 2024-01-01 PROCEDURE — 99232 SBSQ HOSP IP/OBS MODERATE 35: CPT | Performed by: HOSPITALIST

## 2024-01-01 PROCEDURE — 99238 HOSP IP/OBS DSCHRG MGMT 30/<: CPT | Performed by: INTERNAL MEDICINE

## 2024-01-01 PROCEDURE — 250N000011 HC RX IP 250 OP 636: Performed by: NURSE ANESTHETIST, CERTIFIED REGISTERED

## 2024-01-01 PROCEDURE — 81001 URINALYSIS AUTO W/SCOPE: CPT | Performed by: STUDENT IN AN ORGANIZED HEALTH CARE EDUCATION/TRAINING PROGRAM

## 2024-01-01 PROCEDURE — 360N000075 HC SURGERY LEVEL 2, PER MIN: Performed by: INTERNAL MEDICINE

## 2024-01-01 PROCEDURE — 99239 HOSP IP/OBS DSCHRG MGMT >30: CPT | Performed by: INTERNAL MEDICINE

## 2024-01-01 PROCEDURE — 250N000013 HC RX MED GY IP 250 OP 250 PS 637: Performed by: INTERNAL MEDICINE

## 2024-01-01 PROCEDURE — 36415 COLL VENOUS BLD VENIPUNCTURE: CPT | Performed by: STUDENT IN AN ORGANIZED HEALTH CARE EDUCATION/TRAINING PROGRAM

## 2024-01-01 PROCEDURE — 87493 C DIFF AMPLIFIED PROBE: CPT | Performed by: HOSPITALIST

## 2024-01-01 PROCEDURE — 85027 COMPLETE CBC AUTOMATED: CPT | Performed by: INTERNAL MEDICINE

## 2024-01-01 PROCEDURE — 250N000011 HC RX IP 250 OP 636: Performed by: BEHAVIOR TECHNICIAN

## 2024-01-01 PROCEDURE — 84630 ASSAY OF ZINC: CPT | Performed by: HOSPITALIST

## 2024-01-01 PROCEDURE — 250N000012 HC RX MED GY IP 250 OP 636 PS 637: Performed by: INTERNAL MEDICINE

## 2024-01-01 PROCEDURE — 250N000011 HC RX IP 250 OP 636: Performed by: INTERNAL MEDICINE

## 2024-01-01 PROCEDURE — 96365 THER/PROPH/DIAG IV INF INIT: CPT

## 2024-01-01 PROCEDURE — 82746 ASSAY OF FOLIC ACID SERUM: CPT | Performed by: HOSPITALIST

## 2024-01-01 PROCEDURE — 99223 1ST HOSP IP/OBS HIGH 75: CPT | Mod: AI | Performed by: PHYSICIAN ASSISTANT

## 2024-01-01 PROCEDURE — 86923 COMPATIBILITY TEST ELECTRIC: CPT | Performed by: PHYSICIAN ASSISTANT

## 2024-01-01 PROCEDURE — 85025 COMPLETE CBC W/AUTO DIFF WBC: CPT | Performed by: EMERGENCY MEDICINE

## 2024-01-01 PROCEDURE — 84295 ASSAY OF SERUM SODIUM: CPT | Performed by: HOSPITALIST

## 2024-01-01 PROCEDURE — 85018 HEMOGLOBIN: CPT | Performed by: ANESTHESIOLOGY

## 2024-01-01 PROCEDURE — 84100 ASSAY OF PHOSPHORUS: CPT | Performed by: HOSPITALIST

## 2024-01-01 PROCEDURE — 82962 GLUCOSE BLOOD TEST: CPT

## 2024-01-01 PROCEDURE — 84145 PROCALCITONIN (PCT): CPT | Performed by: HOSPITALIST

## 2024-01-01 PROCEDURE — P9016 RBC LEUKOCYTES REDUCED: HCPCS | Performed by: EMERGENCY MEDICINE

## 2024-01-01 PROCEDURE — 258N000003 HC RX IP 258 OP 636: Performed by: INTERNAL MEDICINE

## 2024-01-01 PROCEDURE — 85027 COMPLETE CBC AUTOMATED: CPT | Performed by: STUDENT IN AN ORGANIZED HEALTH CARE EDUCATION/TRAINING PROGRAM

## 2024-01-01 PROCEDURE — 82040 ASSAY OF SERUM ALBUMIN: CPT | Performed by: EMERGENCY MEDICINE

## 2024-01-01 PROCEDURE — 84132 ASSAY OF SERUM POTASSIUM: CPT | Performed by: EMERGENCY MEDICINE

## 2024-01-01 PROCEDURE — 85018 HEMOGLOBIN: CPT | Performed by: HOSPITALIST

## 2024-01-01 PROCEDURE — 0DJ08ZZ INSPECTION OF UPPER INTESTINAL TRACT, VIA NATURAL OR ARTIFICIAL OPENING ENDOSCOPIC: ICD-10-PCS | Performed by: INTERNAL MEDICINE

## 2024-01-01 PROCEDURE — 80069 RENAL FUNCTION PANEL: CPT | Performed by: STUDENT IN AN ORGANIZED HEALTH CARE EDUCATION/TRAINING PROGRAM

## 2024-01-01 PROCEDURE — 0W3P8ZZ CONTROL BLEEDING IN GASTROINTESTINAL TRACT, VIA NATURAL OR ARTIFICIAL OPENING ENDOSCOPIC: ICD-10-PCS | Performed by: INTERNAL MEDICINE

## 2024-01-01 PROCEDURE — 83880 ASSAY OF NATRIURETIC PEPTIDE: CPT | Performed by: STUDENT IN AN ORGANIZED HEALTH CARE EDUCATION/TRAINING PROGRAM

## 2024-01-01 PROCEDURE — C9113 INJ PANTOPRAZOLE SODIUM, VIA: HCPCS | Performed by: BEHAVIOR TECHNICIAN

## 2024-01-01 PROCEDURE — 82565 ASSAY OF CREATININE: CPT | Performed by: EMERGENCY MEDICINE

## 2024-01-01 PROCEDURE — 99232 SBSQ HOSP IP/OBS MODERATE 35: CPT | Performed by: STUDENT IN AN ORGANIZED HEALTH CARE EDUCATION/TRAINING PROGRAM

## 2024-01-01 PROCEDURE — 258N000003 HC RX IP 258 OP 636: Performed by: ANESTHESIOLOGY

## 2024-01-01 PROCEDURE — 36415 COLL VENOUS BLD VENIPUNCTURE: CPT | Performed by: ANESTHESIOLOGY

## 2024-01-01 PROCEDURE — 93005 ELECTROCARDIOGRAM TRACING: CPT

## 2024-01-01 PROCEDURE — 272N000001 HC OR GENERAL SUPPLY STERILE: Performed by: INTERNAL MEDICINE

## 2024-01-01 PROCEDURE — 85610 PROTHROMBIN TIME: CPT | Performed by: EMERGENCY MEDICINE

## 2024-01-01 PROCEDURE — 999N000053 HC STATISTIC EGD (OR PROCEDURE): Performed by: INTERNAL MEDICINE

## 2024-01-01 PROCEDURE — 250N000009 HC RX 250: Performed by: NURSE ANESTHETIST, CERTIFIED REGISTERED

## 2024-01-01 PROCEDURE — 71046 X-RAY EXAM CHEST 2 VIEWS: CPT

## 2024-01-01 PROCEDURE — 84132 ASSAY OF SERUM POTASSIUM: CPT | Performed by: HOSPITALIST

## 2024-01-01 PROCEDURE — 999N000141 HC STATISTIC PRE-PROCEDURE NURSING ASSESSMENT: Performed by: INTERNAL MEDICINE

## 2024-01-01 PROCEDURE — 250N000009 HC RX 250: Performed by: EMERGENCY MEDICINE

## 2024-01-01 PROCEDURE — 87637 SARSCOV2&INF A&B&RSV AMP PRB: CPT | Performed by: STUDENT IN AN ORGANIZED HEALTH CARE EDUCATION/TRAINING PROGRAM

## 2024-01-01 PROCEDURE — 81001 URINALYSIS AUTO W/SCOPE: CPT | Performed by: EMERGENCY MEDICINE

## 2024-01-01 PROCEDURE — 82374 ASSAY BLOOD CARBON DIOXIDE: CPT | Performed by: STUDENT IN AN ORGANIZED HEALTH CARE EDUCATION/TRAINING PROGRAM

## 2024-01-01 PROCEDURE — 82525 ASSAY OF COPPER: CPT | Performed by: HOSPITALIST

## 2024-01-01 PROCEDURE — 89190 NASAL SMEAR FOR EOSINOPHILS: CPT | Performed by: INTERNAL MEDICINE

## 2024-01-01 PROCEDURE — 86900 BLOOD TYPING SEROLOGIC ABO: CPT | Performed by: EMERGENCY MEDICINE

## 2024-01-01 PROCEDURE — 99239 HOSP IP/OBS DSCHRG MGMT >30: CPT | Performed by: HOSPITALIST

## 2024-01-01 PROCEDURE — 85018 HEMOGLOBIN: CPT | Performed by: EMERGENCY MEDICINE

## 2024-01-01 PROCEDURE — 99232 SBSQ HOSP IP/OBS MODERATE 35: CPT | Performed by: INTERNAL MEDICINE

## 2024-01-01 PROCEDURE — 99222 1ST HOSP IP/OBS MODERATE 55: CPT | Performed by: INTERNAL MEDICINE

## 2024-01-01 PROCEDURE — 85004 AUTOMATED DIFF WBC COUNT: CPT | Performed by: EMERGENCY MEDICINE

## 2024-01-01 PROCEDURE — 370N000017 HC ANESTHESIA TECHNICAL FEE, PER MIN: Performed by: INTERNAL MEDICINE

## 2024-01-01 PROCEDURE — 80048 BASIC METABOLIC PNL TOTAL CA: CPT | Performed by: INTERNAL MEDICINE

## 2024-01-01 PROCEDURE — 99285 EMERGENCY DEPT VISIT HI MDM: CPT | Mod: 25

## 2024-01-01 PROCEDURE — 84100 ASSAY OF PHOSPHORUS: CPT | Performed by: INTERNAL MEDICINE

## 2024-01-01 PROCEDURE — 84484 ASSAY OF TROPONIN QUANT: CPT | Performed by: EMERGENCY MEDICINE

## 2024-01-01 PROCEDURE — 83735 ASSAY OF MAGNESIUM: CPT | Performed by: STUDENT IN AN ORGANIZED HEALTH CARE EDUCATION/TRAINING PROGRAM

## 2024-01-01 PROCEDURE — 250N000013 HC RX MED GY IP 250 OP 250 PS 637: Performed by: EMERGENCY MEDICINE

## 2024-01-01 PROCEDURE — C9113 INJ PANTOPRAZOLE SODIUM, VIA: HCPCS | Performed by: EMERGENCY MEDICINE

## 2024-01-01 PROCEDURE — 710N000012 HC RECOVERY PHASE 2, PER MINUTE: Performed by: INTERNAL MEDICINE

## 2024-01-01 PROCEDURE — 86900 BLOOD TYPING SEROLOGIC ABO: CPT | Performed by: STUDENT IN AN ORGANIZED HEALTH CARE EDUCATION/TRAINING PROGRAM

## 2024-01-01 PROCEDURE — 96374 THER/PROPH/DIAG INJ IV PUSH: CPT

## 2024-01-01 PROCEDURE — 84295 ASSAY OF SERUM SODIUM: CPT | Performed by: INTERNAL MEDICINE

## 2024-01-01 PROCEDURE — 87507 IADNA-DNA/RNA PROBE TQ 12-25: CPT | Performed by: HOSPITALIST

## 2024-01-01 PROCEDURE — 96360 HYDRATION IV INFUSION INIT: CPT | Mod: 59

## 2024-01-01 PROCEDURE — 82306 VITAMIN D 25 HYDROXY: CPT | Performed by: HOSPITALIST

## 2024-01-01 PROCEDURE — 86923 COMPATIBILITY TEST ELECTRIC: CPT | Performed by: INTERNAL MEDICINE

## 2024-01-01 PROCEDURE — P9016 RBC LEUKOCYTES REDUCED: HCPCS | Performed by: INTERNAL MEDICINE

## 2024-01-01 PROCEDURE — 97116 GAIT TRAINING THERAPY: CPT | Mod: GP | Performed by: PHYSICAL THERAPIST

## 2024-01-01 PROCEDURE — 83605 ASSAY OF LACTIC ACID: CPT | Performed by: BEHAVIOR TECHNICIAN

## 2024-01-01 PROCEDURE — 250N000011 HC RX IP 250 OP 636: Performed by: PHYSICIAN ASSISTANT

## 2024-01-01 PROCEDURE — 36415 COLL VENOUS BLD VENIPUNCTURE: CPT | Performed by: EMERGENCY MEDICINE

## 2024-01-01 PROCEDURE — 76770 US EXAM ABDO BACK WALL COMP: CPT

## 2024-01-01 PROCEDURE — 84132 ASSAY OF SERUM POTASSIUM: CPT | Performed by: STUDENT IN AN ORGANIZED HEALTH CARE EDUCATION/TRAINING PROGRAM

## 2024-01-01 PROCEDURE — 36416 COLLJ CAPILLARY BLOOD SPEC: CPT | Performed by: PHYSICIAN ASSISTANT

## 2024-01-01 PROCEDURE — 85025 COMPLETE CBC W/AUTO DIFF WBC: CPT | Performed by: STUDENT IN AN ORGANIZED HEALTH CARE EDUCATION/TRAINING PROGRAM

## 2024-01-01 PROCEDURE — 85730 THROMBOPLASTIN TIME PARTIAL: CPT | Performed by: EMERGENCY MEDICINE

## 2024-01-01 PROCEDURE — 96375 TX/PRO/DX INJ NEW DRUG ADDON: CPT

## 2024-01-01 PROCEDURE — 36430 TRANSFUSION BLD/BLD COMPNT: CPT

## 2024-01-01 PROCEDURE — 84207 ASSAY OF VITAMIN B-6: CPT | Performed by: HOSPITALIST

## 2024-01-01 PROCEDURE — 83735 ASSAY OF MAGNESIUM: CPT | Performed by: EMERGENCY MEDICINE

## 2024-01-01 PROCEDURE — 80048 BASIC METABOLIC PNL TOTAL CA: CPT | Performed by: HOSPITALIST

## 2024-01-01 PROCEDURE — 99223 1ST HOSP IP/OBS HIGH 75: CPT | Mod: AI | Performed by: INTERNAL MEDICINE

## 2024-01-01 PROCEDURE — 258N000003 HC RX IP 258 OP 636: Performed by: BEHAVIOR TECHNICIAN

## 2024-01-01 PROCEDURE — 83735 ASSAY OF MAGNESIUM: CPT | Performed by: PHYSICIAN ASSISTANT

## 2024-01-01 PROCEDURE — 84484 ASSAY OF TROPONIN QUANT: CPT | Performed by: PHYSICIAN ASSISTANT

## 2024-01-01 PROCEDURE — 360N000076 HC SURGERY LEVEL 3, PER MIN: Performed by: INTERNAL MEDICINE

## 2024-01-01 PROCEDURE — 258N000003 HC RX IP 258 OP 636: Performed by: NURSE ANESTHETIST, CERTIFIED REGISTERED

## 2024-01-01 PROCEDURE — 83605 ASSAY OF LACTIC ACID: CPT | Performed by: EMERGENCY MEDICINE

## 2024-01-01 PROCEDURE — 82607 VITAMIN B-12: CPT | Performed by: HOSPITALIST

## 2024-01-01 PROCEDURE — 99222 1ST HOSP IP/OBS MODERATE 55: CPT | Mod: AI | Performed by: STUDENT IN AN ORGANIZED HEALTH CARE EDUCATION/TRAINING PROGRAM

## 2024-01-01 PROCEDURE — 74174 CTA ABD&PLVS W/CONTRAST: CPT | Mod: MG

## 2024-01-01 PROCEDURE — 97161 PT EVAL LOW COMPLEX 20 MIN: CPT | Mod: GP | Performed by: PHYSICAL THERAPIST

## 2024-01-01 PROCEDURE — 51798 US URINE CAPACITY MEASURE: CPT

## 2024-01-01 PROCEDURE — 82570 ASSAY OF URINE CREATININE: CPT | Performed by: HOSPITALIST

## 2024-01-01 PROCEDURE — 258N000001 HC RX 258: Performed by: EMERGENCY MEDICINE

## 2024-01-01 PROCEDURE — 84484 ASSAY OF TROPONIN QUANT: CPT | Performed by: STUDENT IN AN ORGANIZED HEALTH CARE EDUCATION/TRAINING PROGRAM

## 2024-01-01 PROCEDURE — 82565 ASSAY OF CREATININE: CPT

## 2024-01-01 RX ORDER — LIDOCAINE 40 MG/G
CREAM TOPICAL
Status: DISCONTINUED | OUTPATIENT
Start: 2024-01-01 | End: 2024-01-01 | Stop reason: HOSPADM

## 2024-01-01 RX ORDER — SIMETHICONE 40MG/0.6ML
133 SUSPENSION, DROPS(FINAL DOSAGE FORM)(ML) ORAL
Status: DISCONTINUED | OUTPATIENT
Start: 2024-01-01 | End: 2024-01-01 | Stop reason: HOSPADM

## 2024-01-01 RX ORDER — AMLODIPINE BESYLATE 5 MG/1
5 TABLET ORAL DAILY
COMMUNITY

## 2024-01-01 RX ORDER — AMOXICILLIN 250 MG
2 CAPSULE ORAL 2 TIMES DAILY PRN
Status: DISCONTINUED | OUTPATIENT
Start: 2024-01-01 | End: 2024-01-01 | Stop reason: HOSPADM

## 2024-01-01 RX ORDER — ACETAMINOPHEN 325 MG/1
325 TABLET ORAL ONCE
Status: DISCONTINUED | OUTPATIENT
Start: 2024-01-01 | End: 2024-01-01

## 2024-01-01 RX ORDER — OXYCODONE HYDROCHLORIDE 5 MG/1
10 TABLET ORAL
Status: DISCONTINUED | OUTPATIENT
Start: 2024-01-01 | End: 2024-01-01 | Stop reason: HOSPADM

## 2024-01-01 RX ORDER — SULFAMETHOXAZOLE AND TRIMETHOPRIM 400; 80 MG/1; MG/1
1 TABLET ORAL
COMMUNITY
Start: 2024-01-01

## 2024-01-01 RX ORDER — FLUMAZENIL 0.1 MG/ML
0.2 INJECTION, SOLUTION INTRAVENOUS
Status: ACTIVE | OUTPATIENT
Start: 2024-01-01 | End: 2024-01-01

## 2024-01-01 RX ORDER — ACETAMINOPHEN 325 MG/1
650 TABLET ORAL EVERY 4 HOURS PRN
Status: DISCONTINUED | OUTPATIENT
Start: 2024-01-01 | End: 2024-01-01 | Stop reason: HOSPADM

## 2024-01-01 RX ORDER — FENTANYL CITRATE 50 UG/ML
25 INJECTION, SOLUTION INTRAMUSCULAR; INTRAVENOUS
Status: DISCONTINUED | OUTPATIENT
Start: 2024-01-01 | End: 2024-01-01 | Stop reason: HOSPADM

## 2024-01-01 RX ORDER — ASPIRIN 325 MG
325 TABLET, DELAYED RELEASE (ENTERIC COATED) ORAL DAILY
Status: ON HOLD | COMMUNITY
End: 2024-01-01

## 2024-01-01 RX ORDER — PANTOPRAZOLE SODIUM 40 MG/1
40 TABLET, DELAYED RELEASE ORAL
Status: DISCONTINUED | OUTPATIENT
Start: 2024-01-01 | End: 2024-01-01 | Stop reason: HOSPADM

## 2024-01-01 RX ORDER — NALOXONE HYDROCHLORIDE 0.4 MG/ML
0.4 INJECTION, SOLUTION INTRAMUSCULAR; INTRAVENOUS; SUBCUTANEOUS
Status: DISCONTINUED | OUTPATIENT
Start: 2024-01-01 | End: 2024-01-01 | Stop reason: HOSPADM

## 2024-01-01 RX ORDER — NALOXONE HYDROCHLORIDE 0.4 MG/ML
0.2 INJECTION, SOLUTION INTRAMUSCULAR; INTRAVENOUS; SUBCUTANEOUS
Status: DISCONTINUED | OUTPATIENT
Start: 2024-01-01 | End: 2024-01-01 | Stop reason: HOSPADM

## 2024-01-01 RX ORDER — SUCRALFATE 1 G/1
1 TABLET ORAL
Status: DISCONTINUED | OUTPATIENT
Start: 2024-01-01 | End: 2024-01-01

## 2024-01-01 RX ORDER — ATENOLOL 100 MG/1
100 TABLET ORAL DAILY
COMMUNITY
End: 2024-01-01

## 2024-01-01 RX ORDER — AMOXICILLIN 250 MG
1 CAPSULE ORAL 2 TIMES DAILY PRN
Status: DISCONTINUED | OUTPATIENT
Start: 2024-01-01 | End: 2024-01-01 | Stop reason: HOSPADM

## 2024-01-01 RX ORDER — ONDANSETRON 4 MG/1
4 TABLET, ORALLY DISINTEGRATING ORAL EVERY 6 HOURS PRN
Status: DISCONTINUED | OUTPATIENT
Start: 2024-01-01 | End: 2024-01-01 | Stop reason: HOSPADM

## 2024-01-01 RX ORDER — CALCIUM CARBONATE 500 MG/1
1000 TABLET, CHEWABLE ORAL 4 TIMES DAILY PRN
Status: DISCONTINUED | OUTPATIENT
Start: 2024-01-01 | End: 2024-01-01 | Stop reason: HOSPADM

## 2024-01-01 RX ORDER — GLYCOPYRROLATE 0.2 MG/ML
INJECTION, SOLUTION INTRAMUSCULAR; INTRAVENOUS PRN
Status: DISCONTINUED | OUTPATIENT
Start: 2024-01-01 | End: 2024-01-01

## 2024-01-01 RX ORDER — ALBUTEROL SULFATE 5 MG/ML
10 SOLUTION RESPIRATORY (INHALATION) ONCE
Status: DISCONTINUED | OUTPATIENT
Start: 2024-01-01 | End: 2024-01-01

## 2024-01-01 RX ORDER — ONDANSETRON 2 MG/ML
4 INJECTION INTRAMUSCULAR; INTRAVENOUS EVERY 6 HOURS PRN
Status: DISCONTINUED | OUTPATIENT
Start: 2024-01-01 | End: 2024-01-01 | Stop reason: HOSPADM

## 2024-01-01 RX ORDER — ONDANSETRON 2 MG/ML
4 INJECTION INTRAMUSCULAR; INTRAVENOUS EVERY 30 MIN PRN
Status: DISCONTINUED | OUTPATIENT
Start: 2024-01-01 | End: 2024-01-01 | Stop reason: HOSPADM

## 2024-01-01 RX ORDER — ACETAMINOPHEN 500 MG
1000 TABLET ORAL DAILY
COMMUNITY

## 2024-01-01 RX ORDER — ACETAMINOPHEN 325 MG/1
975 TABLET ORAL ONCE
Status: COMPLETED | OUTPATIENT
Start: 2024-01-01 | End: 2024-01-01

## 2024-01-01 RX ORDER — DEXTROSE MONOHYDRATE 25 G/50ML
25 INJECTION, SOLUTION INTRAVENOUS ONCE
Status: COMPLETED | OUTPATIENT
Start: 2024-01-01 | End: 2024-01-01

## 2024-01-01 RX ORDER — ASPIRIN 325 MG
325 TABLET, DELAYED RELEASE (ENTERIC COATED) ORAL DAILY
DISCHARGE
Start: 2024-01-01 | End: 2024-01-01

## 2024-01-01 RX ORDER — EPINEPHRINE 1 MG/ML
0.1 INJECTION, SOLUTION, CONCENTRATE INTRAVENOUS
Status: DISCONTINUED | OUTPATIENT
Start: 2024-01-01 | End: 2024-01-01 | Stop reason: HOSPADM

## 2024-01-01 RX ORDER — SUCRALFATE ORAL 1 G/10ML
1 SUSPENSION ORAL
Status: DISCONTINUED | OUTPATIENT
Start: 2024-01-01 | End: 2024-01-01 | Stop reason: HOSPADM

## 2024-01-01 RX ORDER — DEXTROSE MONOHYDRATE 25 G/50ML
25-50 INJECTION, SOLUTION INTRAVENOUS
Status: DISCONTINUED | OUTPATIENT
Start: 2024-01-01 | End: 2024-01-01 | Stop reason: HOSPADM

## 2024-01-01 RX ORDER — ASPIRIN 81 MG/1
81 TABLET ORAL DAILY
Status: ON HOLD | COMMUNITY
End: 2024-01-01

## 2024-01-01 RX ORDER — ONDANSETRON 4 MG/1
4 TABLET, ORALLY DISINTEGRATING ORAL EVERY 30 MIN PRN
Status: DISCONTINUED | OUTPATIENT
Start: 2024-01-01 | End: 2024-01-01 | Stop reason: HOSPADM

## 2024-01-01 RX ORDER — FENTANYL CITRATE 50 UG/ML
25 INJECTION, SOLUTION INTRAMUSCULAR; INTRAVENOUS EVERY 5 MIN PRN
Status: DISCONTINUED | OUTPATIENT
Start: 2024-01-01 | End: 2024-01-01 | Stop reason: HOSPADM

## 2024-01-01 RX ORDER — AMLODIPINE BESYLATE 5 MG/1
5 TABLET ORAL DAILY
Status: DISCONTINUED | OUTPATIENT
Start: 2024-01-01 | End: 2024-01-01 | Stop reason: HOSPADM

## 2024-01-01 RX ORDER — FLUMAZENIL 0.1 MG/ML
0.2 INJECTION, SOLUTION INTRAVENOUS
Status: DISCONTINUED | OUTPATIENT
Start: 2024-01-01 | End: 2024-01-01 | Stop reason: HOSPADM

## 2024-01-01 RX ORDER — AMOXICILLIN 250 MG
1 CAPSULE ORAL 2 TIMES DAILY PRN
Status: DISCONTINUED | OUTPATIENT
Start: 2024-01-01 | End: 2024-01-01

## 2024-01-01 RX ORDER — SPIRONOLACTONE 100 MG/1
100 TABLET, FILM COATED ORAL DAILY
Status: ON HOLD | COMMUNITY
End: 2024-01-01

## 2024-01-01 RX ORDER — SPIRONOLACTONE 25 MG/1
25 TABLET ORAL DAILY
COMMUNITY
End: 2024-01-01

## 2024-01-01 RX ORDER — NICOTINE POLACRILEX 4 MG
15-30 LOZENGE BUCCAL
Status: DISCONTINUED | OUTPATIENT
Start: 2024-01-01 | End: 2024-01-01 | Stop reason: HOSPADM

## 2024-01-01 RX ORDER — ACETAMINOPHEN 325 MG/1
975 TABLET ORAL ONCE
Status: DISCONTINUED | OUTPATIENT
Start: 2024-01-01 | End: 2024-01-01 | Stop reason: HOSPADM

## 2024-01-01 RX ORDER — SODIUM CHLORIDE, SODIUM LACTATE, POTASSIUM CHLORIDE, CALCIUM CHLORIDE 600; 310; 30; 20 MG/100ML; MG/100ML; MG/100ML; MG/100ML
INJECTION, SOLUTION INTRAVENOUS CONTINUOUS
Status: DISCONTINUED | OUTPATIENT
Start: 2024-01-01 | End: 2024-01-01 | Stop reason: HOSPADM

## 2024-01-01 RX ORDER — FENTANYL CITRATE 50 UG/ML
50-100 INJECTION, SOLUTION INTRAMUSCULAR; INTRAVENOUS EVERY 5 MIN PRN
Status: DISCONTINUED | OUTPATIENT
Start: 2024-01-01 | End: 2024-01-01 | Stop reason: HOSPADM

## 2024-01-01 RX ORDER — SODIUM CHLORIDE, SODIUM LACTATE, POTASSIUM CHLORIDE, CALCIUM CHLORIDE 600; 310; 30; 20 MG/100ML; MG/100ML; MG/100ML; MG/100ML
INJECTION, SOLUTION INTRAVENOUS CONTINUOUS
Status: DISCONTINUED | OUTPATIENT
Start: 2024-01-01 | End: 2024-01-01

## 2024-01-01 RX ORDER — LIDOCAINE 40 MG/G
CREAM TOPICAL
Status: DISCONTINUED | OUTPATIENT
Start: 2024-01-01 | End: 2024-01-01

## 2024-01-01 RX ORDER — HYDROCHLOROTHIAZIDE 12.5 MG/1
12.5 CAPSULE ORAL EVERY MORNING
COMMUNITY
Start: 2024-01-01 | End: 2024-01-01

## 2024-01-01 RX ORDER — PANTOPRAZOLE SODIUM 40 MG/1
40 TABLET, DELAYED RELEASE ORAL
Status: DISCONTINUED | OUTPATIENT
Start: 2024-01-01 | End: 2024-01-01

## 2024-01-01 RX ORDER — HYDROMORPHONE HCL IN WATER/PF 6 MG/30 ML
0.2 PATIENT CONTROLLED ANALGESIA SYRINGE INTRAVENOUS EVERY 5 MIN PRN
Status: DISCONTINUED | OUTPATIENT
Start: 2024-01-01 | End: 2024-01-01 | Stop reason: HOSPADM

## 2024-01-01 RX ORDER — IOPAMIDOL 755 MG/ML
500 INJECTION, SOLUTION INTRAVASCULAR ONCE
Status: COMPLETED | OUTPATIENT
Start: 2024-01-01 | End: 2024-01-01

## 2024-01-01 RX ORDER — PREDNISONE 10 MG/1
10 TABLET ORAL DAILY
COMMUNITY

## 2024-01-01 RX ORDER — SUCRALFATE ORAL 1 G/10ML
1 SUSPENSION ORAL
Qty: 560 ML | Refills: 0 | Status: SHIPPED | OUTPATIENT
Start: 2024-01-01 | End: 2024-01-01

## 2024-01-01 RX ORDER — LIDOCAINE HYDROCHLORIDE 20 MG/ML
INJECTION, SOLUTION INFILTRATION; PERINEURAL PRN
Status: DISCONTINUED | OUTPATIENT
Start: 2024-01-01 | End: 2024-01-01

## 2024-01-01 RX ORDER — SPIRONOLACTONE 100 MG/1
100 TABLET, FILM COATED ORAL DAILY
Status: DISCONTINUED | OUTPATIENT
Start: 2024-01-01 | End: 2024-01-01 | Stop reason: HOSPADM

## 2024-01-01 RX ORDER — ATROPINE SULFATE 0.1 MG/ML
1 INJECTION INTRAVENOUS
Status: DISCONTINUED | OUTPATIENT
Start: 2024-01-01 | End: 2024-01-01 | Stop reason: HOSPADM

## 2024-01-01 RX ORDER — ATORVASTATIN CALCIUM 20 MG/1
20 TABLET, FILM COATED ORAL DAILY
COMMUNITY

## 2024-01-01 RX ORDER — PANTOPRAZOLE SODIUM 40 MG/1
40 TABLET, DELAYED RELEASE ORAL 2 TIMES DAILY
Qty: 180 TABLET | Refills: 0 | Status: SHIPPED | OUTPATIENT
Start: 2024-01-01 | End: 2024-01-01

## 2024-01-01 RX ORDER — LANOLIN ALCOHOL/MO/W.PET/CERES
1000 CREAM (GRAM) TOPICAL DAILY
COMMUNITY
End: 2024-01-01

## 2024-01-01 RX ORDER — DIPHENHYDRAMINE HYDROCHLORIDE 50 MG/ML
25-50 INJECTION INTRAMUSCULAR; INTRAVENOUS
Status: DISCONTINUED | OUTPATIENT
Start: 2024-01-01 | End: 2024-01-01 | Stop reason: HOSPADM

## 2024-01-01 RX ORDER — PROPOFOL 10 MG/ML
INJECTION, EMULSION INTRAVENOUS CONTINUOUS PRN
Status: DISCONTINUED | OUTPATIENT
Start: 2024-01-01 | End: 2024-01-01

## 2024-01-01 RX ORDER — TRAMADOL HYDROCHLORIDE 50 MG/1
50 TABLET ORAL 2 TIMES DAILY
Status: DISCONTINUED | OUTPATIENT
Start: 2024-01-01 | End: 2024-01-01 | Stop reason: HOSPADM

## 2024-01-01 RX ORDER — OXYCODONE HYDROCHLORIDE 5 MG/1
5 TABLET ORAL
Status: DISCONTINUED | OUTPATIENT
Start: 2024-01-01 | End: 2024-01-01 | Stop reason: HOSPADM

## 2024-01-01 RX ORDER — PANTOPRAZOLE SODIUM 40 MG/1
40 TABLET, DELAYED RELEASE ORAL
Qty: 30 TABLET | Refills: 0 | Status: SHIPPED | OUTPATIENT
Start: 2024-01-01 | End: 2024-01-01

## 2024-01-01 RX ORDER — HYDROMORPHONE HCL IN WATER/PF 6 MG/30 ML
0.4 PATIENT CONTROLLED ANALGESIA SYRINGE INTRAVENOUS EVERY 5 MIN PRN
Status: DISCONTINUED | OUTPATIENT
Start: 2024-01-01 | End: 2024-01-01 | Stop reason: HOSPADM

## 2024-01-01 RX ORDER — AMLODIPINE BESYLATE 5 MG/1
5 TABLET ORAL DAILY
Status: DISCONTINUED | OUTPATIENT
Start: 2024-01-01 | End: 2024-01-01

## 2024-01-01 RX ORDER — ACETAMINOPHEN 650 MG/1
650 SUPPOSITORY RECTAL EVERY 4 HOURS PRN
Status: DISCONTINUED | OUTPATIENT
Start: 2024-01-01 | End: 2024-01-01 | Stop reason: HOSPADM

## 2024-01-01 RX ORDER — LABETALOL HYDROCHLORIDE 5 MG/ML
10 INJECTION, SOLUTION INTRAVENOUS EVERY 5 MIN PRN
Status: DISCONTINUED | OUTPATIENT
Start: 2024-01-01 | End: 2024-01-01 | Stop reason: HOSPADM

## 2024-01-01 RX ORDER — SULFAMETHOXAZOLE AND TRIMETHOPRIM 400; 80 MG/1; MG/1
1 TABLET ORAL
Status: DISCONTINUED | OUTPATIENT
Start: 2024-01-01 | End: 2024-01-01 | Stop reason: HOSPADM

## 2024-01-01 RX ORDER — NITROGLYCERIN 0.4 MG/1
0.4 TABLET SUBLINGUAL EVERY 5 MIN PRN
COMMUNITY

## 2024-01-01 RX ORDER — MULTIPLE VITAMINS W/ MINERALS TAB 9MG-400MCG
1 TAB ORAL DAILY
Status: DISCONTINUED | OUTPATIENT
Start: 2024-01-01 | End: 2024-01-01 | Stop reason: HOSPADM

## 2024-01-01 RX ORDER — SUCRALFATE ORAL 1 G/10ML
1 SUSPENSION ORAL
Status: DISCONTINUED | OUTPATIENT
Start: 2024-01-01 | End: 2024-01-01

## 2024-01-01 RX ORDER — LISINOPRIL 40 MG/1
40 TABLET ORAL DAILY
COMMUNITY
End: 2024-01-01

## 2024-01-01 RX ORDER — LIDOCAINE 4 G/G
1 PATCH TOPICAL EVERY 24 HOURS
COMMUNITY
End: 2024-01-01

## 2024-01-01 RX ORDER — ACETAMINOPHEN 325 MG/1
650 TABLET ORAL ONCE
Status: DISCONTINUED | OUTPATIENT
Start: 2024-01-01 | End: 2024-01-01

## 2024-01-01 RX ORDER — FENTANYL CITRATE 50 UG/ML
50 INJECTION, SOLUTION INTRAMUSCULAR; INTRAVENOUS EVERY 5 MIN PRN
Status: DISCONTINUED | OUTPATIENT
Start: 2024-01-01 | End: 2024-01-01 | Stop reason: HOSPADM

## 2024-01-01 RX ORDER — LIDOCAINE HYDROCHLORIDE 20 MG/ML
6 JELLY TOPICAL ONCE
Status: COMPLETED | OUTPATIENT
Start: 2024-01-01 | End: 2024-01-01

## 2024-01-01 RX ORDER — PROPOFOL 10 MG/ML
INJECTION, EMULSION INTRAVENOUS PRN
Status: DISCONTINUED | OUTPATIENT
Start: 2024-01-01 | End: 2024-01-01

## 2024-01-01 RX ORDER — LIDOCAINE 4 G/G
1 PATCH TOPICAL
Status: DISCONTINUED | OUTPATIENT
Start: 2024-01-01 | End: 2024-01-01 | Stop reason: HOSPADM

## 2024-01-01 RX ORDER — ACETAMINOPHEN 500 MG
1000 TABLET ORAL DAILY PRN
COMMUNITY
End: 2024-01-01

## 2024-01-01 RX ORDER — PREDNISONE 10 MG/1
10 TABLET ORAL DAILY
Status: DISCONTINUED | OUTPATIENT
Start: 2024-01-01 | End: 2024-01-01 | Stop reason: HOSPADM

## 2024-01-01 RX ORDER — DEXAMETHASONE SODIUM PHOSPHATE 4 MG/ML
4 INJECTION, SOLUTION INTRA-ARTICULAR; INTRALESIONAL; INTRAMUSCULAR; INTRAVENOUS; SOFT TISSUE
Status: DISCONTINUED | OUTPATIENT
Start: 2024-01-01 | End: 2024-01-01 | Stop reason: HOSPADM

## 2024-01-01 RX ORDER — HYDROMORPHONE HYDROCHLORIDE 2 MG/1
1-2 TABLET ORAL EVERY 4 HOURS PRN
COMMUNITY
End: 2024-01-01

## 2024-01-01 RX ORDER — AMOXICILLIN 250 MG
1 CAPSULE ORAL 2 TIMES DAILY PRN
COMMUNITY

## 2024-01-01 RX ORDER — SODIUM CHLORIDE 9 MG/ML
INJECTION, SOLUTION INTRAVENOUS CONTINUOUS
Status: DISCONTINUED | OUTPATIENT
Start: 2024-01-01 | End: 2024-01-01

## 2024-01-01 RX ORDER — AMLODIPINE BESYLATE 10 MG/1
10 TABLET ORAL DAILY
COMMUNITY
End: 2024-01-01

## 2024-01-01 RX ORDER — HYDROMORPHONE HCL IN WATER/PF 6 MG/30 ML
0.2 PATIENT CONTROLLED ANALGESIA SYRINGE INTRAVENOUS
Status: DISCONTINUED | OUTPATIENT
Start: 2024-01-01 | End: 2024-01-01 | Stop reason: HOSPADM

## 2024-01-01 RX ORDER — AMLODIPINE BESYLATE 10 MG/1
10 TABLET ORAL DAILY
Status: ON HOLD | COMMUNITY
End: 2024-01-01

## 2024-01-01 RX ORDER — NALOXONE HYDROCHLORIDE 0.4 MG/ML
0.1 INJECTION, SOLUTION INTRAMUSCULAR; INTRAVENOUS; SUBCUTANEOUS
Status: DISCONTINUED | OUTPATIENT
Start: 2024-01-01 | End: 2024-01-01 | Stop reason: HOSPADM

## 2024-01-01 RX ORDER — TRAMADOL HYDROCHLORIDE 50 MG/1
50 TABLET ORAL 2 TIMES DAILY
COMMUNITY

## 2024-01-01 RX ADMIN — PANTOPRAZOLE SODIUM 40 MG: 40 TABLET, DELAYED RELEASE ORAL at 16:52

## 2024-01-01 RX ADMIN — SODIUM CHLORIDE 6.3 UNITS: 9 INJECTION, SOLUTION INTRAVENOUS at 16:13

## 2024-01-01 RX ADMIN — SUCRALFATE 1 G: 1 SUSPENSION ORAL at 23:18

## 2024-01-01 RX ADMIN — PANTOPRAZOLE SODIUM 40 MG: 40 INJECTION, POWDER, FOR SOLUTION INTRAVENOUS at 16:50

## 2024-01-01 RX ADMIN — SODIUM BICARBONATE: 84 INJECTION, SOLUTION INTRAVENOUS at 13:38

## 2024-01-01 RX ADMIN — PHENYLEPHRINE HYDROCHLORIDE 150 MCG: 10 INJECTION INTRAVENOUS at 16:46

## 2024-01-01 RX ADMIN — Medication 1 TABLET: at 18:55

## 2024-01-01 RX ADMIN — SODIUM CHLORIDE 1000 ML: 9 INJECTION, SOLUTION INTRAVENOUS at 15:45

## 2024-01-01 RX ADMIN — SULFAMETHOXAZOLE AND TRIMETHOPRIM 1 TABLET: 400; 80 TABLET ORAL at 09:16

## 2024-01-01 RX ADMIN — TRAMADOL HYDROCHLORIDE 50 MG: 50 TABLET, COATED ORAL at 20:00

## 2024-01-01 RX ADMIN — SODIUM CHLORIDE 500 ML: 9 INJECTION, SOLUTION INTRAVENOUS at 12:52

## 2024-01-01 RX ADMIN — SODIUM CHLORIDE: 9 INJECTION, SOLUTION INTRAVENOUS at 19:08

## 2024-01-01 RX ADMIN — SODIUM CHLORIDE 500 ML: 9 INJECTION, SOLUTION INTRAVENOUS at 14:08

## 2024-01-01 RX ADMIN — DEXTROSE MONOHYDRATE 300 ML: 100 INJECTION, SOLUTION INTRAVENOUS at 16:13

## 2024-01-01 RX ADMIN — PREDNISONE 10 MG: 10 TABLET ORAL at 08:57

## 2024-01-01 RX ADMIN — SUCRALFATE 1 G: 1 SUSPENSION ORAL at 19:50

## 2024-01-01 RX ADMIN — PROPOFOL 90 MCG/KG/MIN: 10 INJECTION, EMULSION INTRAVENOUS at 12:19

## 2024-01-01 RX ADMIN — PANTOPRAZOLE SODIUM 40 MG: 40 TABLET, DELAYED RELEASE ORAL at 06:35

## 2024-01-01 RX ADMIN — THIAMINE HCL TAB 100 MG 100 MG: 100 TAB at 09:57

## 2024-01-01 RX ADMIN — PANTOPRAZOLE SODIUM 40 MG: 40 TABLET, DELAYED RELEASE ORAL at 08:50

## 2024-01-01 RX ADMIN — SODIUM ZIRCONIUM CYCLOSILICATE 10 G: 5 POWDER, FOR SUSPENSION ORAL at 09:09

## 2024-01-01 RX ADMIN — SUCRALFATE 1 G: 1 SUSPENSION ORAL at 21:37

## 2024-01-01 RX ADMIN — PANTOPRAZOLE SODIUM 40 MG: 40 TABLET, DELAYED RELEASE ORAL at 06:32

## 2024-01-01 RX ADMIN — AMLODIPINE BESYLATE 5 MG: 5 TABLET ORAL at 08:57

## 2024-01-01 RX ADMIN — ACETAMINOPHEN 650 MG: 325 TABLET, FILM COATED ORAL at 16:43

## 2024-01-01 RX ADMIN — TRAMADOL HYDROCHLORIDE 50 MG: 50 TABLET, COATED ORAL at 07:41

## 2024-01-01 RX ADMIN — PANTOPRAZOLE SODIUM 40 MG: 40 TABLET, DELAYED RELEASE ORAL at 09:08

## 2024-01-01 RX ADMIN — PANTOPRAZOLE SODIUM 40 MG: 40 TABLET, DELAYED RELEASE ORAL at 18:30

## 2024-01-01 RX ADMIN — SUCRALFATE 1 G: 1 SUSPENSION ORAL at 08:50

## 2024-01-01 RX ADMIN — PREDNISONE 10 MG: 10 TABLET ORAL at 07:41

## 2024-01-01 RX ADMIN — PANTOPRAZOLE SODIUM 40 MG: 40 INJECTION, POWDER, FOR SOLUTION INTRAVENOUS at 12:37

## 2024-01-01 RX ADMIN — Medication 1 TABLET: at 09:10

## 2024-01-01 RX ADMIN — SODIUM CHLORIDE, POTASSIUM CHLORIDE, SODIUM LACTATE AND CALCIUM CHLORIDE: 600; 310; 30; 20 INJECTION, SOLUTION INTRAVENOUS at 21:06

## 2024-01-01 RX ADMIN — SODIUM BICARBONATE: 84 INJECTION, SOLUTION INTRAVENOUS at 06:34

## 2024-01-01 RX ADMIN — THIAMINE HCL TAB 100 MG 100 MG: 100 TAB at 09:10

## 2024-01-01 RX ADMIN — SUCRALFATE 1 G: 1 SUSPENSION ORAL at 11:35

## 2024-01-01 RX ADMIN — PANTOPRAZOLE SODIUM 40 MG: 40 TABLET, DELAYED RELEASE ORAL at 08:30

## 2024-01-01 RX ADMIN — ACETAMINOPHEN 650 MG: 325 TABLET, FILM COATED ORAL at 02:23

## 2024-01-01 RX ADMIN — PANTOPRAZOLE SODIUM 40 MG: 40 INJECTION, POWDER, FOR SOLUTION INTRAVENOUS at 02:10

## 2024-01-01 RX ADMIN — SODIUM BICARBONATE: 84 INJECTION, SOLUTION INTRAVENOUS at 09:17

## 2024-01-01 RX ADMIN — SPIRONOLACTONE 100 MG: 100 TABLET, FILM COATED ORAL at 08:50

## 2024-01-01 RX ADMIN — PREDNISONE 10 MG: 10 TABLET ORAL at 16:38

## 2024-01-01 RX ADMIN — PHENYLEPHRINE HYDROCHLORIDE 100 MCG: 10 INJECTION INTRAVENOUS at 16:34

## 2024-01-01 RX ADMIN — ACETAMINOPHEN 650 MG: 325 TABLET, FILM COATED ORAL at 12:08

## 2024-01-01 RX ADMIN — SODIUM BICARBONATE: 84 INJECTION, SOLUTION INTRAVENOUS at 20:39

## 2024-01-01 RX ADMIN — ACETAMINOPHEN 650 MG: 325 TABLET, FILM COATED ORAL at 09:59

## 2024-01-01 RX ADMIN — PANTOPRAZOLE SODIUM 40 MG: 40 TABLET, DELAYED RELEASE ORAL at 09:06

## 2024-01-01 RX ADMIN — PANTOPRAZOLE SODIUM 40 MG: 40 TABLET, DELAYED RELEASE ORAL at 06:39

## 2024-01-01 RX ADMIN — PANTOPRAZOLE SODIUM 40 MG: 40 TABLET, DELAYED RELEASE ORAL at 17:27

## 2024-01-01 RX ADMIN — SODIUM CHLORIDE 8 MG/HR: 9 INJECTION, SOLUTION INTRAVENOUS at 03:55

## 2024-01-01 RX ADMIN — SODIUM CHLORIDE, POTASSIUM CHLORIDE, SODIUM LACTATE AND CALCIUM CHLORIDE: 600; 310; 30; 20 INJECTION, SOLUTION INTRAVENOUS at 23:23

## 2024-01-01 RX ADMIN — SODIUM BICARBONATE: 84 INJECTION, SOLUTION INTRAVENOUS at 23:54

## 2024-01-01 RX ADMIN — GLYCOPYRROLATE 0.1 MG: 0.2 INJECTION, SOLUTION INTRAMUSCULAR; INTRAVENOUS at 12:18

## 2024-01-01 RX ADMIN — PROPOFOL 30 MG: 10 INJECTION, EMULSION INTRAVENOUS at 16:12

## 2024-01-01 RX ADMIN — SUCRALFATE 1 G: 1 SUSPENSION ORAL at 16:57

## 2024-01-01 RX ADMIN — IOPAMIDOL 93 ML: 755 INJECTION, SOLUTION INTRAVENOUS at 10:20

## 2024-01-01 RX ADMIN — Medication 1 TABLET: at 10:11

## 2024-01-01 RX ADMIN — SODIUM ZIRCONIUM CYCLOSILICATE 10 G: 5 POWDER, FOR SUSPENSION ORAL at 13:18

## 2024-01-01 RX ADMIN — SUCRALFATE 1 G: 1 SUSPENSION ORAL at 11:29

## 2024-01-01 RX ADMIN — SUCRALFATE 1 G: 1 SUSPENSION ORAL at 21:42

## 2024-01-01 RX ADMIN — AMLODIPINE BESYLATE 5 MG: 5 TABLET ORAL at 08:16

## 2024-01-01 RX ADMIN — PANTOPRAZOLE SODIUM 40 MG: 40 TABLET, DELAYED RELEASE ORAL at 09:44

## 2024-01-01 RX ADMIN — PANTOPRAZOLE SODIUM 40 MG: 40 TABLET, DELAYED RELEASE ORAL at 16:23

## 2024-01-01 RX ADMIN — ACETAMINOPHEN 650 MG: 325 TABLET, FILM COATED ORAL at 19:32

## 2024-01-01 RX ADMIN — SODIUM CHLORIDE 8 MG/HR: 9 INJECTION, SOLUTION INTRAVENOUS at 22:46

## 2024-01-01 RX ADMIN — TRAMADOL HYDROCHLORIDE 50 MG: 50 TABLET, COATED ORAL at 19:32

## 2024-01-01 RX ADMIN — PANTOPRAZOLE SODIUM 40 MG: 40 INJECTION, POWDER, FOR SOLUTION INTRAVENOUS at 03:00

## 2024-01-01 RX ADMIN — THIAMINE HCL TAB 100 MG 100 MG: 100 TAB at 18:55

## 2024-01-01 RX ADMIN — SODIUM CHLORIDE 1000 ML: 9 INJECTION, SOLUTION INTRAVENOUS at 09:55

## 2024-01-01 RX ADMIN — PANTOPRAZOLE SODIUM 40 MG: 40 INJECTION, POWDER, FOR SOLUTION INTRAVENOUS at 13:56

## 2024-01-01 RX ADMIN — SODIUM ZIRCONIUM CYCLOSILICATE 10 G: 5 POWDER, FOR SUSPENSION ORAL at 00:55

## 2024-01-01 RX ADMIN — THIAMINE HCL TAB 100 MG 100 MG: 100 TAB at 09:06

## 2024-01-01 RX ADMIN — ACETAMINOPHEN 650 MG: 325 TABLET, FILM COATED ORAL at 08:16

## 2024-01-01 RX ADMIN — AMLODIPINE BESYLATE 5 MG: 5 TABLET ORAL at 07:41

## 2024-01-01 RX ADMIN — LIDOCAINE HYDROCHLORIDE 40 MG: 20 INJECTION, SOLUTION INFILTRATION; PERINEURAL at 12:18

## 2024-01-01 RX ADMIN — DEXTROSE MONOHYDRATE 25 G: 25 INJECTION, SOLUTION INTRAVENOUS at 16:12

## 2024-01-01 RX ADMIN — SODIUM CHLORIDE, POTASSIUM CHLORIDE, SODIUM LACTATE AND CALCIUM CHLORIDE: 600; 310; 30; 20 INJECTION, SOLUTION INTRAVENOUS at 11:33

## 2024-01-01 RX ADMIN — SODIUM CHLORIDE 8 MG/HR: 9 INJECTION, SOLUTION INTRAVENOUS at 15:07

## 2024-01-01 RX ADMIN — TRAMADOL HYDROCHLORIDE 50 MG: 50 TABLET, COATED ORAL at 08:57

## 2024-01-01 RX ADMIN — SODIUM CHLORIDE 8 MG/HR: 9 INJECTION, SOLUTION INTRAVENOUS at 21:51

## 2024-01-01 RX ADMIN — SODIUM CHLORIDE 8 MG/HR: 9 INJECTION, SOLUTION INTRAVENOUS at 12:47

## 2024-01-01 RX ADMIN — SODIUM CHLORIDE 8 MG/HR: 9 INJECTION, SOLUTION INTRAVENOUS at 05:33

## 2024-01-01 RX ADMIN — PANTOPRAZOLE SODIUM 40 MG: 40 TABLET, DELAYED RELEASE ORAL at 16:15

## 2024-01-01 RX ADMIN — SODIUM CHLORIDE 8 MG/HR: 9 INJECTION, SOLUTION INTRAVENOUS at 06:04

## 2024-01-01 RX ADMIN — SODIUM CHLORIDE, POTASSIUM CHLORIDE, SODIUM LACTATE AND CALCIUM CHLORIDE: 600; 310; 30; 20 INJECTION, SOLUTION INTRAVENOUS at 15:15

## 2024-01-01 RX ADMIN — SODIUM CHLORIDE, POTASSIUM CHLORIDE, SODIUM LACTATE AND CALCIUM CHLORIDE: 600; 310; 30; 20 INJECTION, SOLUTION INTRAVENOUS at 09:41

## 2024-01-01 RX ADMIN — Medication 1 TABLET: at 09:06

## 2024-01-01 RX ADMIN — SUCRALFATE 1 G: 1 SUSPENSION ORAL at 16:15

## 2024-01-01 RX ADMIN — PROPOFOL 100 MCG/KG/MIN: 10 INJECTION, EMULSION INTRAVENOUS at 16:11

## 2024-01-01 RX ADMIN — PHENYLEPHRINE HYDROCHLORIDE 100 MCG: 10 INJECTION INTRAVENOUS at 16:55

## 2024-01-01 RX ADMIN — LIDOCAINE HYDROCHLORIDE 6 ML: 20 JELLY TOPICAL at 13:10

## 2024-01-01 RX ADMIN — Medication 1 TABLET: at 09:57

## 2024-01-01 RX ADMIN — SPIRONOLACTONE 100 MG: 100 TABLET, FILM COATED ORAL at 12:58

## 2024-01-01 RX ADMIN — SODIUM BICARBONATE: 84 INJECTION, SOLUTION INTRAVENOUS at 03:16

## 2024-01-01 RX ADMIN — THIAMINE HCL TAB 100 MG 100 MG: 100 TAB at 10:11

## 2024-01-01 RX ADMIN — SODIUM CHLORIDE 8 MG/HR: 9 INJECTION, SOLUTION INTRAVENOUS at 01:19

## 2024-01-01 RX ADMIN — SODIUM CHLORIDE: 9 INJECTION, SOLUTION INTRAVENOUS at 04:59

## 2024-01-01 RX ADMIN — SODIUM BICARBONATE: 84 INJECTION, SOLUTION INTRAVENOUS at 16:25

## 2024-01-01 RX ADMIN — SUCRALFATE 1 G: 1 SUSPENSION ORAL at 09:52

## 2024-01-01 RX ADMIN — PANTOPRAZOLE SODIUM 40 MG: 40 TABLET, DELAYED RELEASE ORAL at 20:07

## 2024-01-01 RX ADMIN — ONDANSETRON 4 MG: 2 INJECTION INTRAMUSCULAR; INTRAVENOUS at 19:48

## 2024-01-01 RX ADMIN — SODIUM ZIRCONIUM CYCLOSILICATE 10 G: 5 POWDER, FOR SUSPENSION ORAL at 00:30

## 2024-01-01 ASSESSMENT — ACTIVITIES OF DAILY LIVING (ADL)
ADLS_ACUITY_SCORE: 27
ADLS_ACUITY_SCORE: 33
ADLS_ACUITY_SCORE: 40
ADLS_ACUITY_SCORE: 37
ADLS_ACUITY_SCORE: 33
ADLS_ACUITY_SCORE: 33
ADLS_ACUITY_SCORE: 40
ADLS_ACUITY_SCORE: 27
ADLS_ACUITY_SCORE: 37
ADLS_ACUITY_SCORE: 41
ADLS_ACUITY_SCORE: 38
ADLS_ACUITY_SCORE: 38
ADLS_ACUITY_SCORE: 35
ADLS_ACUITY_SCORE: 27
ADLS_ACUITY_SCORE: 27
ADLS_ACUITY_SCORE: 38
ADLS_ACUITY_SCORE: 37
ADLS_ACUITY_SCORE: 42
ADLS_ACUITY_SCORE: 38
ADLS_ACUITY_SCORE: 33
ADLS_ACUITY_SCORE: 27
ADLS_ACUITY_SCORE: 38
ADLS_ACUITY_SCORE: 42
ADLS_ACUITY_SCORE: 37
ADLS_ACUITY_SCORE: 33
ADLS_ACUITY_SCORE: 38
ADLS_ACUITY_SCORE: 33
ADLS_ACUITY_SCORE: 33
ADLS_ACUITY_SCORE: 37
ADLS_ACUITY_SCORE: 38
ADLS_ACUITY_SCORE: 27
ADLS_ACUITY_SCORE: 38
ADLS_ACUITY_SCORE: 46
ADLS_ACUITY_SCORE: 32
ADLS_ACUITY_SCORE: 33
ADLS_ACUITY_SCORE: 27
ADLS_ACUITY_SCORE: 39
ADLS_ACUITY_SCORE: 38
ADLS_ACUITY_SCORE: 40
ADLS_ACUITY_SCORE: 37
ADLS_ACUITY_SCORE: 38
ADLS_ACUITY_SCORE: 38
ADLS_ACUITY_SCORE: 33
ADLS_ACUITY_SCORE: 37
ADLS_ACUITY_SCORE: 40
ADLS_ACUITY_SCORE: 27
ADLS_ACUITY_SCORE: 41
ADLS_ACUITY_SCORE: 38
ADLS_ACUITY_SCORE: 39
ADLS_ACUITY_SCORE: 39
ADLS_ACUITY_SCORE: 35
ADLS_ACUITY_SCORE: 41
ADLS_ACUITY_SCORE: 25
ADLS_ACUITY_SCORE: 27
ADLS_ACUITY_SCORE: 27
ADLS_ACUITY_SCORE: 38
ADLS_ACUITY_SCORE: 37
ADLS_ACUITY_SCORE: 27
ADLS_ACUITY_SCORE: 27
ADLS_ACUITY_SCORE: 38
ADLS_ACUITY_SCORE: 40
ADLS_ACUITY_SCORE: 27
ADLS_ACUITY_SCORE: 41
ADLS_ACUITY_SCORE: 37
ADLS_ACUITY_SCORE: 41
ADLS_ACUITY_SCORE: 39
ADLS_ACUITY_SCORE: 37
ADLS_ACUITY_SCORE: 33
ADLS_ACUITY_SCORE: 37
ADLS_ACUITY_SCORE: 27
ADLS_ACUITY_SCORE: 27
ADLS_ACUITY_SCORE: 38
ADLS_ACUITY_SCORE: 35
ADLS_ACUITY_SCORE: 38
ADLS_ACUITY_SCORE: 40
ADLS_ACUITY_SCORE: 37
ADLS_ACUITY_SCORE: 42
ADLS_ACUITY_SCORE: 32
ADLS_ACUITY_SCORE: 27
ADLS_ACUITY_SCORE: 37
ADLS_ACUITY_SCORE: 34
ADLS_ACUITY_SCORE: 27
ADLS_ACUITY_SCORE: 37
ADLS_ACUITY_SCORE: 39
ADLS_ACUITY_SCORE: 33
ADLS_ACUITY_SCORE: 39
ADLS_ACUITY_SCORE: 33
ADLS_ACUITY_SCORE: 40
ADLS_ACUITY_SCORE: 33
ADLS_ACUITY_SCORE: 35
ADLS_ACUITY_SCORE: 27
ADLS_ACUITY_SCORE: 37
ADLS_ACUITY_SCORE: 41
ADLS_ACUITY_SCORE: 40
ADLS_ACUITY_SCORE: 27
ADLS_ACUITY_SCORE: 27
ADLS_ACUITY_SCORE: 40
ADLS_ACUITY_SCORE: 39
ADLS_ACUITY_SCORE: 27
ADLS_ACUITY_SCORE: 38
ADLS_ACUITY_SCORE: 27
ADLS_ACUITY_SCORE: 38
ADLS_ACUITY_SCORE: 42
ADLS_ACUITY_SCORE: 39
ADLS_ACUITY_SCORE: 33
ADLS_ACUITY_SCORE: 37
ADLS_ACUITY_SCORE: 32
ADLS_ACUITY_SCORE: 27
ADLS_ACUITY_SCORE: 38
ADLS_ACUITY_SCORE: 27
ADLS_ACUITY_SCORE: 32
ADLS_ACUITY_SCORE: 37
ADLS_ACUITY_SCORE: 38
ADLS_ACUITY_SCORE: 27
ADLS_ACUITY_SCORE: 35
ADLS_ACUITY_SCORE: 27
ADLS_ACUITY_SCORE: 37
ADLS_ACUITY_SCORE: 27
ADLS_ACUITY_SCORE: 37
ADLS_ACUITY_SCORE: 39
ADLS_ACUITY_SCORE: 27
ADLS_ACUITY_SCORE: 37
ADLS_ACUITY_SCORE: 40
ADLS_ACUITY_SCORE: 37
ADLS_ACUITY_SCORE: 40
ADLS_ACUITY_SCORE: 27
ADLS_ACUITY_SCORE: 33
ADLS_ACUITY_SCORE: 40
ADLS_ACUITY_SCORE: 39
ADLS_ACUITY_SCORE: 37
ADLS_ACUITY_SCORE: 40
ADLS_ACUITY_SCORE: 37
ADLS_ACUITY_SCORE: 38
ADLS_ACUITY_SCORE: 38
ADLS_ACUITY_SCORE: 37
ADLS_ACUITY_SCORE: 35
ADLS_ACUITY_SCORE: 40
ADLS_ACUITY_SCORE: 27
ADLS_ACUITY_SCORE: 52
ADLS_ACUITY_SCORE: 27
ADLS_ACUITY_SCORE: 35
ADLS_ACUITY_SCORE: 27
ADLS_ACUITY_SCORE: 37
ADLS_ACUITY_SCORE: 33
ADLS_ACUITY_SCORE: 42
ADLS_ACUITY_SCORE: 33
ADLS_ACUITY_SCORE: 34
ADLS_ACUITY_SCORE: 52
ADLS_ACUITY_SCORE: 38
ADLS_ACUITY_SCORE: 27
ADLS_ACUITY_SCORE: 29
ADLS_ACUITY_SCORE: 41
ADLS_ACUITY_SCORE: 37
ADLS_ACUITY_SCORE: 39
ADLS_ACUITY_SCORE: 27
ADLS_ACUITY_SCORE: 37
ADLS_ACUITY_SCORE: 38
ADLS_ACUITY_SCORE: 39
ADLS_ACUITY_SCORE: 46
ADLS_ACUITY_SCORE: 27
ADLS_ACUITY_SCORE: 35
ADLS_ACUITY_SCORE: 38
ADLS_ACUITY_SCORE: 41
ADLS_ACUITY_SCORE: 27
ADLS_ACUITY_SCORE: 33
DEPENDENT_IADLS:: INDEPENDENT
ADLS_ACUITY_SCORE: 38
ADLS_ACUITY_SCORE: 29
ADLS_ACUITY_SCORE: 52
ADLS_ACUITY_SCORE: 37
ADLS_ACUITY_SCORE: 38
ADLS_ACUITY_SCORE: 33
ADLS_ACUITY_SCORE: 37
ADLS_ACUITY_SCORE: 40
ADLS_ACUITY_SCORE: 34
ADLS_ACUITY_SCORE: 37
ADLS_ACUITY_SCORE: 35
ADLS_ACUITY_SCORE: 33
ADLS_ACUITY_SCORE: 38
ADLS_ACUITY_SCORE: 27
ADLS_ACUITY_SCORE: 39
ADLS_ACUITY_SCORE: 35
ADLS_ACUITY_SCORE: 35
ADLS_ACUITY_SCORE: 41
ADLS_ACUITY_SCORE: 33
ADLS_ACUITY_SCORE: 38
ADLS_ACUITY_SCORE: 33
ADLS_ACUITY_SCORE: 27
ADLS_ACUITY_SCORE: 40
ADLS_ACUITY_SCORE: 38
ADLS_ACUITY_SCORE: 38
ADLS_ACUITY_SCORE: 25
ADLS_ACUITY_SCORE: 38
ADLS_ACUITY_SCORE: 38
ADLS_ACUITY_SCORE: 27
ADLS_ACUITY_SCORE: 37
ADLS_ACUITY_SCORE: 42
ADLS_ACUITY_SCORE: 33
ADLS_ACUITY_SCORE: 38
ADLS_ACUITY_SCORE: 34
ADLS_ACUITY_SCORE: 35
ADLS_ACUITY_SCORE: 27
ADLS_ACUITY_SCORE: 27
ADLS_ACUITY_SCORE: 38
ADLS_ACUITY_SCORE: 41
ADLS_ACUITY_SCORE: 37
ADLS_ACUITY_SCORE: 27
ADLS_ACUITY_SCORE: 38
ADLS_ACUITY_SCORE: 35
ADLS_ACUITY_SCORE: 38
ADLS_ACUITY_SCORE: 35
ADLS_ACUITY_SCORE: 27
ADLS_ACUITY_SCORE: 35
ADLS_ACUITY_SCORE: 33
ADLS_ACUITY_SCORE: 37
ADLS_ACUITY_SCORE: 40
ADLS_ACUITY_SCORE: 33
ADLS_ACUITY_SCORE: 33
ADLS_ACUITY_SCORE: 37
ADLS_ACUITY_SCORE: 39
ADLS_ACUITY_SCORE: 38
ADLS_ACUITY_SCORE: 36
ADLS_ACUITY_SCORE: 37
ADLS_ACUITY_SCORE: 41
ADLS_ACUITY_SCORE: 37
ADLS_ACUITY_SCORE: 42
ADLS_ACUITY_SCORE: 25
ADLS_ACUITY_SCORE: 33
ADLS_ACUITY_SCORE: 52
ADLS_ACUITY_SCORE: 37
ADLS_ACUITY_SCORE: 35
ADLS_ACUITY_SCORE: 33
ADLS_ACUITY_SCORE: 25

## 2024-01-01 ASSESSMENT — COLUMBIA-SUICIDE SEVERITY RATING SCALE - C-SSRS
6. HAVE YOU EVER DONE ANYTHING, STARTED TO DO ANYTHING, OR PREPARED TO DO ANYTHING TO END YOUR LIFE?: NO
2. HAVE YOU ACTUALLY HAD ANY THOUGHTS OF KILLING YOURSELF IN THE PAST MONTH?: NO
1. IN THE PAST MONTH, HAVE YOU WISHED YOU WERE DEAD OR WISHED YOU COULD GO TO SLEEP AND NOT WAKE UP?: NO

## 2024-02-03 PROBLEM — D64.9 ANEMIA, UNSPECIFIED TYPE: Status: ACTIVE | Noted: 2024-01-01

## 2024-02-03 PROBLEM — R07.89 CHEST TIGHTNESS: Status: ACTIVE | Noted: 2024-01-01

## 2024-02-03 PROBLEM — K92.2 ACUTE GI BLEEDING: Status: ACTIVE | Noted: 2024-01-01

## 2024-02-03 PROBLEM — R79.89 ELEVATED TROPONIN: Status: ACTIVE | Noted: 2024-01-01

## 2024-02-03 NOTE — ED NOTES
RECEIVING UNIT ED HANDOFF REVIEW    Above ED Nurse Handoff Report was reviewed: Yes  Reviewed by: Teresa Brooke RN on February 3, 2024 at 1:45 PM       [facility]  ED Nurse Handoff Report    ED Chief complaint: Melena  . ED Diagnosis:   Final diagnoses:   Acute GI bleeding   Anemia, unspecified type   Chest tightness   Elevated troponin   Hyperkalemia   Urinary obstruction       Allergies:   Allergies   Allergen Reactions    Atenolol      Other reaction(s): Bradycardia    Lisinopril      Other reaction(s): *Unknown    Statins Muscle Pain (Myalgia)     Bad dreams    Ibuprofen      Other reaction(s): Headache       Code Status: DNR / DNI    Activity level - Baseline/Home:  assist of 1 and walker   Activity Level - Current:   in bed.   Lift room needed: No.   Bariatric: No   Needed: No   Isolation: No   Infection: Not Applicable.     Respiratory status: Room air    Vital Signs (within 30 minutes):   Vitals:    02/03/24 1230 02/03/24 1254 02/03/24 1300 02/03/24 1313   BP: (!) 150/67 112/74 112/73 106/68   Pulse: (!) 125 83 84 85   Resp:  20 20 20   Temp:  97.6  F (36.4  C)  97.7  F (36.5  C)   TempSrc:       SpO2:  100% 99% 99%       Cardiac Rhythm:  ,      Pain level:    Patient confused: No.   Patient Falls Risk: nonskid shoes/slippers when out of bed, arm band in place, activity supervised, and toileting schedule implemented.   Elimination Status: Urethral catheter (kee) in place; refer to orders to discontinue as per protocol      Patient Report - Initial Complaint: Black tony stool post chemo yesterday .   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        Abnormal Results:   Labs Ordered and Resulted from Time of ED Arrival to Time of ED Departure    COMPREHENSIVE METABOLIC PANEL - Abnormal       Result Value    Sodium 136      Potassium 5.8 (*)     Carbon Dioxide (CO2) 18 (*)     Anion Gap 14      Urea Nitrogen 76.1 (*)     Creatinine 1.67 (*)     GFR Estimate 40 (*)     Calcium 9.0      Chloride 104      Glucose 172 (*)     Alkaline Phosphatase 73      AST 13      ALT 13      Protein Total 5.9 (*)     Albumin 3.7      Bilirubin Total <0.2     INR - Abnormal    INR 1.18 (*)    LACTIC ACID WHOLE BLOOD - Abnormal    Lactic Acid 3.4 (*)    CBC WITH PLATELETS AND DIFFERENTIAL - Abnormal    WBC Count 15.1 (*)     RBC Count 3.01 (*)     Hemoglobin 9.2 (*)     Hematocrit 29.2 (*)     MCV 97      MCH 30.6      MCHC 31.5      RDW 14.3      Platelet Count 258      % Neutrophils 88      % Lymphocytes 4      % Monocytes 5      % Eosinophils 0      % Basophils 0      % Immature Granulocytes 3      NRBCs per 100 WBC 0      Absolute Neutrophils 13.2 (*)     Absolute Lymphocytes 0.6 (*)     Absolute Monocytes 0.8      Absolute Eosinophils 0.1      Absolute Basophils 0.0      Absolute Immature Granulocytes 0.4      Absolute NRBCs 0.0     ISTAT CREATININE POCT - Abnormal    Creatinine POCT 1.9 (*)     GFR, ESTIMATED POCT 34 (*)    POTASSIUM - Abnormal    Potassium 5.5 (*)    HEMOGLOBIN - Abnormal    Hemoglobin 7.5 (*)    LACTIC ACID WHOLE BLOOD - Abnormal    Lactic Acid 2.6 (*)    TROPONIN T, HIGH SENSITIVITY - Abnormal    Troponin T, High Sensitivity 42 (*)    TROPONIN T, HIGH SENSITIVITY - Abnormal    Troponin T, High Sensitivity 35 (*)    PARTIAL THROMBOPLASTIN TIME - Normal    aPTT 26     LACTIC ACID WHOLE BLOOD - Normal    Lactic Acid 1.5     POTASSIUM - Normal    Potassium 4.9     ROUTINE UA WITH MICROSCOPIC REFLEX TO CULTURE   TYPE AND SCREEN, ADULT    ABO/RH(D) O POS      Antibody Screen Negative      SPECIMEN EXPIRATION DATE [id number]     PREPARE RED BLOOD CELLS (UNIT)    Blood Component Type Red Blood Cells      Product Code [id number]      Unit Status Transfused       Unit Number [id number]      CROSSMATCH Compatible      CODING SYSTEM [id number]      ISSUE DATE AND TIME [id number]      UNIT ABO/RH O+      UNIT TYPE ISBT 5100     PREPARE RED BLOOD CELLS (UNIT)   TRANSFUSE RED BLOOD CELLS (UNIT)   ABO/RH TYPE AND SCREEN        CTA Abdomen Pelvis with Contrast   Final Result   IMPRESSION:   1.  Mild thickening and enhancement of the first and second portions of the duodenum. Differential considerations include duodenitis and duodenal ulcer disease. Consider endoscopy for further evaluation.   2.  Colonic diverticulosis, without evidence for diverticulitis.   3.  No definite site of active GI bleeding is identified.   4.  Prostatic enlargement.   5.  Large area of ill-defined lytic and sclerotic change involving the left iliac bone, suspicious for primary or metastatic malignancy.             Treatments provided: See MAR   Family Comments: daughter and son   OBS brochure/video discussed/provided to patient:  N/A  ED Medications:   Medications   sodium chloride 0.9% BOLUS 1,000 mL (0 mLs Intravenous Stopped 2/3/24 1104)   iopamidol (ISOVUE-370) solution 500 mL (93 mLs Intravenous $Given 2/3/24 1020)   sodium chloride (PF) 0.9% PF flush 100 mL (71 mLs Intravenous $Given 2/3/24 1025)   pantoprazole (PROTONIX) IV push injection 40 mg (40 mg Intravenous $Given 2/3/24 1237)   sodium chloride 0.9% BOLUS 500 mL (500 mLs Intravenous $New Bag 2/3/24 1252)   lidocaine (XYLOCAINE) 2 % external gel 6 mL (6 mLs Urethral $Given 2/3/24 1310)       Drips infusing:  No  For the majority of the shift this patient was Green.   Interventions performed were Gordon and blood transfusion .    Sepsis treatment initiated: No    Cares/treatment/interventions/medications to be completed following ED care: watch for stool incontinent     ED Nurse Name: Billie Monroy RN  1:29 PM

## 2024-02-03 NOTE — ED NOTES
Report given to PACU, informed 3rd floor regarding pt's current status. Pt has continuous blood infusion, no reaction at this point

## 2024-02-03 NOTE — ED NOTES
Pt attempted to use urinal, able to urinate only a small amount. Bladder scanner done by ED Tech with report of 447 mL.

## 2024-02-03 NOTE — CONSULTS
CONSULTING PHYSICIAN REASON FOR CONSULTATION   Garry Desai, * Melena, hematochezia     CHIEF COMPLAINT   Zack Booth came to the hospital for evaluation of GI bleed     HISTORY OF PRESENT ILLNESS   Zack Booth is a pleasant 83 year old male with recent diagnosis of metastatic lung cancer who was started on Keytruda earlier this week and developed black tarry stool with some maroon-colored stools this morning.  He denies much abdominal pain, but endorses some cramps at the moment.  He takes Advil as well.  He reports off-and-on reflux, but nothing persistent.  Denies any dysphagia, prior GI bleeding.  Upon presentation, CT angio was obtained that suggested possible ulcer/duodenitis.    He has received 1 unit of packed RBC with improvement in hemoglobin to 9 from 7.5 earlier.     PAST HISTORY      Past Medical History:   Diagnosis Date    Cancer (H)     Chronic kidney disease, stage III (moderate) (H)     3/4/2015    Congenital renal agenesis and dysgenesis     3/4/2015,Hospitalized following over-medication with Atenolol.    Coronary atherosclerosis     3/4/2015    Disturbance of skin sensation     3/4/2015    Essential hypertension     3/4/2015    Other and unspecified hyperlipidemia     3/4/2015    Other symptoms involving nervous and musculoskeletal systems     3/4/2015    Postsurgical percutaneous transluminal coronary angioplasty status     3/4/2015      Past Surgical History:   Procedure Laterality Date    APPENDECTOMY OPEN      No Comments Provided    ARTHROPLASTY KNEE      x7 knee surgeries.    HEART CATH, ANGIOPLASTY      No Comments Provided    OTHER SURGICAL HISTORY      [id number],OTHER    VASECTOMY      No Comments Provided      Family History Social History   Family History   Problem Relation Age of Onset    Genetic Disorder Other         Genetic,No FHx of DM         MEDICATIONS & ALLERGIES   Medications Prior to Admission   Medication Sig Dispense Refill Last Dose    acetaminophen  (TYLENOL) 500 MG tablet Take 1,000 mg by mouth 3 times daily   Unknown    amLODIPine (NORVASC) 10 MG tablet Take 10 mg by mouth daily   2/3/2024 at AM    aspirin (ASA) 325 MG EC tablet Take 325 mg by mouth daily   2/3/2024 at AM    cyanocobalamin (VITAMIN B-12) 1000 MCG tablet Take 1,000 mcg by mouth daily   Unknown    HYDROmorphone (DILAUDID) 2 MG tablet Take 1-2 mg by mouth every 4 hours as needed for severe pain   Unknown    Lidocaine (LIDOCARE) 4 % Patch Place 1 patch onto the skin every 24 hours To prevent lidocaine toxicity, patient should be patch free for 12 hrs daily.   Unknown    nitroGLYcerin (NITROSTAT) 0.4 MG sublingual tablet Place 0.4 mg under the tongue every 5 minutes as needed for chest pain For chest pain place 1 tablet under the tongue every 5 minutes for 3 doses. If symptoms persist 5 minutes after 1st dose call 911.   Unknown    senna-docusate (SENOKOT-S/PERICOLACE) 8.6-50 MG tablet Take 1 tablet by mouth 2 times daily   Unknown    spironolactone (ALDACTONE) 100 MG tablet Take 100 mg by mouth daily   2/3/2024 at AM      Allergies   Allergen Reactions    Atenolol      Other reaction(s): Bradycardia    Lisinopril      Other reaction(s): *Unknown    Statins Muscle Pain (Myalgia)     Bad dreams    Ibuprofen      Other reaction(s): Headache        REVIEW OF SYSTEMS    no chest pain   no SOB   no fevers/sweats/chills   no numbness or weakness  A comprehensive review of systems was performed and further details can be found in the HPI and PMH.     OBJECTIVE   Vitals /85   Pulse 84   Temp 97.2  F (36.2  C) (Temporal)   Resp 16   Wt 63.5 kg (140 lb)   SpO2 100%   BMI 20.98 kg/m             Physical Exam  GENERAL: alert and oriented, well nourished in mild distress   SKIN: warm and dry, color normal   HEAD: atraumatic, normocephalic   EYES: lids and conjunctivae normal   OROPHARYNX: oral mucosa and pharynx normal   NECK: thyroid and lymph nodes normal, trachea midline   CHEST: normal  respiratory effort, unlabored   CARDIOVASCULAR: normal rate and rhythm, no murmurs   ABDOMEN: no tenderness, no distention, soft   NEUROLOGICAL: grossly intact   PSYCHIATRIC: normal mood, affect and insight        LABORATORY AND IMAGING    ELECTROLYTE PANEL   Recent Labs   Lab Test 02/03/24  1238 02/03/24  1104 02/03/24  0911   POTASSIUM 4.9 5.5* 5.8*   CHLORIDE  --   --  104   BUN  --   --  76.1*   ALBUMIN  --   --  3.7      HEMATOLOGY PANEL   Recent Labs   Lab Test 02/03/24  0911   WBC 15.1*   RBC 3.01*   HCT 29.2*   MCV 97   MCH 30.6   MCHC 31.5         LIVER AND PANCREAS PANEL   Recent Labs   Lab Test 02/03/24  1331 02/03/24  0911   ALBUMIN  --  3.7   BILITOTAL  --  <0.2   ALT  --  13   AST  --  13   PROTEIN Negative  --       I have reviewed the current diagnostic and laboratory tests.     IMPRESSION / RECOMMENDATIONS   Upper GI bleed  Acute on chronic anemia  Metastatic lung cancer    83-year-old male receiving Keytruda for metastatic lung cancer, chronic NSAID use who is presenting with GI bleed, most likely from duodenal source.  Will proceed with upper endoscopy, as he has been hemodynamically stabilized with 1 unit of packed RBC.  Agree with PPI therapy and frequent H&H monitoring.         Approximately 45minutes of total time was spent providing patient care, including patient   evaluation, reviewing documentation/test results, and .  Kavin Duong MD       [facility]  [phone]  I appreciate the opportunity to participate in the care of this patient. Please feel free to call me with any questions.

## 2024-02-03 NOTE — H&P
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    Acute GI Bleed  Acute Blood Loss Anemia  Pt presents with acute onset of black tarry stools this morning.  Denies any abdominal pain.  Currently on Keytruda for treatment of lung cancer and is on ASA daily for hx of CAD.  Hemodynamically stable.  Hgb on arrival 9.2 down to 7.5 on repeat two hours later.  CTA abd/pelvis with no definite site of active GI bleeding identified, but revealed mild thickening and enhancement of the first and second portions of the duodenum.  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  Diagnosed 12/2023.  Currently on Keytruda with last infusion 2/1/24.      Chronic COPD  Tobacco Abuse  No signs of acute exacerbation    CAD  HTN  Hx of PCI ~2000.  In 2013, he underwent cardiac catheterization and had a stent placed in his circumflex coronary. He did have a lesion of his LAD which was 60 to 70% and total occlusion of his right coronary artery.  Pt denies any chest pain.  Troponin flat 42/35   - hold pta Amlodipine, ASA, Spironolactone in the setting of GIB    Hx SSS and Atrial Fibrillation  Per chart review, diagnosed in 2013.  PPM was recommended of which patient declined.  On ASA only.  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    Patient presents to the ED today with his daughters.  He reports he developed incontinent black colored stools this morning around 8-9am.  He denies any abdominal pain, nausea, emesis, chest pain, shortness of breath currently.  Family confirms he is on Keytruda for treatment of lung cancer and they were told to watch for side effects of dark colored stools.  He takes an aspirin daily.         Past Medical History:     Past Medical History:   Diagnosis Date    Chronic kidney disease, stage III (moderate) (H)     3/4/2015    Congenital renal agenesis and dysgenesis     3/4/2015,Hospitalized following over-medication with Atenolol.    Coronary atherosclerosis     3/4/2015    Disturbance of skin sensation     3/4/2015    Essential hypertension     3/4/2015    Other and unspecified hyperlipidemia     3/4/2015    Other symptoms involving nervous and musculoskeletal systems     3/4/2015    Postsurgical percutaneous transluminal coronary angioplasty status     3/4/2015          Past Surgical History:     Past Surgical History:   Procedure Laterality Date    APPENDECTOMY OPEN      No Comments Provided    ARTHROPLASTY KNEE      x7 knee surgeries.    HEART CATH, ANGIOPLASTY      No Comments Provided    OTHER SURGICAL HISTORY      [id number],OTHER    VASECTOMY      No Comments Provided          Social History:     Social History     Socioeconomic History    Marital status:      Spouse name: Cami     Number of children: Not on file    Years of education: Not on file    Highest education level: Not on file   Occupational History    Not on file   Tobacco Use    Smoking status: Every Day     Packs/day: 1.00     Years: 70.00     Additional pack years: 0.00     Total pack years: 70.00     Types: Cigarettes    Smokeless tobacco: Never    Tobacco comments:     Quit smoking: -- started at about age 5 years old   Substance and Sexual Activity    Alcohol use: No     Comment: Alcoholic Drinks/day: maybe 1 glass of wine at holiday.    Drug use: Unknown     Types: Other     Comment: Drug use: No    Sexual activity: Not on file   Other Topics Concern    Not on file   Social History Narrative     - wife Cami -  55 years as of     4 children. 2nd son  in .     Social Determinants of Health     Financial Resource Strain: Not on file   Food Insecurity: Not on file   Transportation Needs: Not on file   Physical Activity: Not on file   Stress: Not on file   Social Connections: Not on file   Interpersonal Safety: Not on file   Housing Stability: Not on file          Family History:     Family History   Problem Relation Age of Onset    Genetic Disorder Other         Genetic,No FHx of DM          Allergies:      Allergies   Allergen Reactions    Atenolol      Other reaction(s): Bradycardia    Lisinopril      Other reaction(s): *Unknown    Statins Muscle Pain (Myalgia)     Bad dreams    Ibuprofen      Other reaction(s): Headache          Medications:     Prior to Admission medications    Medication Sig Last Dose Taking? Auth Provider Long Term End Date   acetaminophen-codeine (TYLENOL #3) 300-30 MG per tablet Take 1-2 tablets by mouth every 4 hours as needed   Reported, Patient     amLODIPine (NORVASC) 10 MG tablet Take 1 tablet by mouth daily   Reported, Patient Yes    aspirin  MG EC tablet Take 1 tablet by mouth daily with food   Reported, Patient     atenolol (TENORMIN) 100 MG tablet Take 1  tablet by mouth daily   Reported, Patient Yes    atorvastatin (LIPITOR) 20 MG tablet Take 1 tablet by mouth daily   Reported, Patient Yes    cholecalciferol (D 5000) 5000 UNITS CAPS Take 1 capsule by mouth daily   Reported, Patient     hydrochlorothiazide (HYDRODIURIL) 25 MG tablet Take 1 tablet by mouth daily   Reported, Patient Yes    HYDROcodone-acetaminophen (VICODIN) 5-500 MG per tablet Take 1-2 tablets by mouth every 4 hours as needed   Reported, Patient     lisinopril (PRINIVIL/ZESTRIL) 40 MG tablet Take 1 tablet by mouth daily   Reported, Patient Yes    nitroGLYcerin (NITROSTAT) 0.4 MG sublingual tablet Place 1 tablet under the tongue every 5 minutes as needed for chest pain   Reported, Patient Yes    omeprazole (PRILOSEC) 20 MG CR capsule Take 1 tablet by mouth daily   Reported, Patient     oxyCODONE-acetaminophen (PERCOCET) 5-325 MG per tablet Take 1-2 tablets by mouth every 4 hours as needed   Reported, Patient     PROPOXYPHENE N-ACETAMINOPHEN PO Take 1 tablet by mouth 4 times daily as needed   Reported, Patient     spironolactone (ALDACTONE) 25 MG tablet Take 1 tablet by mouth daily   Reported, Patient Yes    warfarin (COUMADIN) 2.5 MG tablet    Reported, Patient            Review of Systems:   A complete ROS was performed and is negative other than what is stated in the HPI.       Physical Exam:   Blood pressure 121/82, pulse 86, temperature 97.6  F (36.4  C), temperature source Oral, resp. rate 14, SpO2 100%.  General: Alert, interactive, NAD, pale and think appearing.  Daughters at the bedside.  Room smells of melena  HEENT: AT/NC  Chest/Resp: clear to auscultation bilaterally, no crackles or wheezes  Heart/CV: regular rate and rhythm, no murmur  Abdomen/GI: Soft, nontender, nondistended. +BS.  No rebound or guarding.  Extremities/MSK: No LE edema  Skin: Warm and dry  Neuro: Alert & oriented, moves all extremities equally         Labs:   ROUTINE ICU LABS (Last four results)  CMP  Recent Labs   Lab  02/03/24  1104 02/03/24  0937 02/03/24  0911   NA  --   --  136   POTASSIUM 5.5*  --  5.8*   CHLORIDE  --   --  104   CO2  --   --  18*   ANIONGAP  --   --  14   GLC  --   --  172*   BUN  --   --  76.1*   CR  --  1.9* 1.67*   GFRESTIMATED  --  34* 40*   KAUR  --   --  9.0   PROTTOTAL  --   --  5.9*   ALBUMIN  --   --  3.7   BILITOTAL  --   --  <0.2   ALKPHOS  --   --  73   AST  --   --  13   ALT  --   --  13     CBC  Recent Labs   Lab 02/03/24  1104 02/03/24  0911   WBC  --  15.1*   RBC  --  3.01*   HGB 7.5* 9.2*   HCT  --  29.2*   MCV  --  97   MCH  --  30.6   MCHC  --  31.5   RDW  --  14.3   PLT  --  258     INR  Recent Labs   Lab 02/03/24  0911   INR 1.18*     Arterial Blood GasNo lab results found in last 7 days.       Imaging/Procedures:     Results for orders placed or performed during the hospital encounter of 02/03/24   CTA Abdomen Pelvis with Contrast    Narrative    EXAM: CTA ABDOMEN AND PELVIS WITH CONTRAST  LOCATION: [facility]  DATE: 02/03/2024    INDICATION: GI bleeding.  COMPARISON: None.  TECHNIQUE: CT angiogram abdomen pelvis during arterial phase of injection of IV contrast. 2D and 3D MIP reconstructions were performed by the CT technologist. Dose reduction techniques were used.  CONTRAST: 93 mL Isovue 370.    FINDINGS:  ANGIOGRAM ABDOMEN/PELVIS: Severe atherosclerotic aortoiliac calcification. The renal arteries, celiac artery, SMA, and LATHA are patent. Infrarenal abdominal aortic aneurysm measures 3.1 cm AP. Bilateral common iliac artery stents are patent.    LOWER CHEST: Severe coronary artery calcification. Ectasia of the ascending thoracic aorta is partially included on this exam, and measures up to 4.3 cm where visualized. Moderate-sized hiatal hernia.    HEPATOBILIARY: No significant mass or bile duct dilatation. No calcified gallstones.     PANCREAS: Normal.    SPLEEN: Normal.    ADRENAL GLANDS: Normal.    KIDNEYS/BLADDER: There are multiple bilateral renal cysts, for  which no specific follow-up would be recommended. The largest of these cysts is in the interpolar region of the left kidney measuring 5.8 cm. No hydronephrosis.    BOWEL: There is mild thickening and enhancement of the first and second portions of the duodenum. Colonic diverticulosis. No evidence for colitis or diverticulitis. No bowel obstruction. Appendectomy. No definitive site for active GI bleeding is   identified.    LYMPH NODES: No lymphadenopathy.    PELVIC ORGANS: The prostate gland is prominently enlarged, measuring 6 cm transverse.    MUSCULOSKELETAL: Degenerative changes in the visualized thoracolumbar spine. Large area of ill-defined lytic and sclerotic change and cortical destruction involving the left iliac bone is suspicious for primary or metastatic malignancy. There are chronic   appearing fractures of multiple right lower ribs.      Impression    IMPRESSION:  1.  Mild thickening and enhancement of the first and second portions of the duodenum. Differential considerations include duodenitis and duodenal ulcer disease. Consider endoscopy for further evaluation.  2.  Colonic diverticulosis, without evidence for diverticulitis.  3.  No definite site of active GI bleeding is identified.  4.  Prostatic enlargement.  5.  Large area of ill-defined lytic and sclerotic change involving the left iliac bone, suspicious for primary or metastatic malignancy.

## 2024-02-03 NOTE — ED NOTES
Bed: ED15  Expected date: 2/3/24  Expected time: 8:45 AM  Means of arrival:   Comments:  Edith [phone]- [phone] M

## 2024-02-03 NOTE — ED NOTES
Pt has another episode of black tony stool when assisting pt up to use the urinal. Pt maintains normal VS. First kee attempts, unsuccessful. Has some clot notice on the tip and hard to advance. Pt does not tolerate well

## 2024-02-03 NOTE — ED PROVIDER NOTES
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  His wife notes that he has had multiple black stools with possible blood clots noted, starting this morning around 8 or 9 AM..  He received Keytruda on Friday and they were told to watch for any signs of GI bleeding.  He notes this is never happened before.  And route, EMS reports that he did have black diarrhea.  The patient denies abdominal pain.  He denies nausea or vomiting.  He denies dizziness,.  He is only on aspirin daily.  He takes no other blood thinners at this time.  Chest pain or shortness of breath      Independent Historian:   Wife and EMS, as above    Review of External Notes:   Outside clinic notes reviewed.  Patient was recently on anticoagulant, now only on aspirin.      Medications:      aspirin 325 MG tablet  Take 1 Tablet (325 mg) by mouth daily.   13 07/01/2004   Active   nitroglycerin (NITROSTAT) 0.4 MG sublingual tablet   Indications: Coronary artery disease involving native heart without angina pectoris, unspecified vessel or lesion type (HRC) Place 1 Tablet (0.4 mg) under tongue every 5 minutes as needed for Chest Pain. If no relief after 5 min call 911;continue 1 tab every 5 min max 3 tab 30 Tablet  2 10/14/2022   Active   cyanocobalamin (VITAMIN B12) 1000 MCG tablet   Indications: B12 deficiency (HRC) Take 1 Tablet (1,000 mcg) by mouth daily. 90 Tablet  3 10/16/2023   Active   amLODIPine (NORVASC) 10 MG tablet   Indications: Essential hypertension (HRC) Take 1 Tablet (10 mg) by mouth daily. 90 Tablet  3 10/16/2023   Active   HYDROmorphone (DILAUDID) 2 MG tablet   Indications: Moderate to Severe Pain Take 0.5-1 Tablets (1-2 mg) by mouth every 4 hours as needed for Pain (moderate (pain score  5-7) or severe (pain score 8-10) pain). Indications: Moderate to Severe Pain 17 Tablet  0 12/14/2023   Active   acetaminophen (TYLENOL) 500 MG tablet   Indications: Pain Take 2 Tablets (1,000 mg) by mouth three times a day. Maximum acetaminophen dose is 4000 mg in 24 hours Indications: Pain 90 Tablet  0 12/14/2023   Active   lidocaine (ASPERCREAM) 4 % patch   Indications: Pain Apply 1 Patch to skin daily. Leave on for up to 12 hours in a 24 hour period, then remove. Indications: Pain 10 Each  0 12/14/2023   Active   sennosides-docusate sodium (SENOKOT S) 8.6-50 MG per tablet   Indications: Constipation Take 1 Tablet by mouth two times a day. Indications: Constipation 60 Tablet  0 12/14/2023   Active   spironolactone (ALDACTONE) 100 MG tablet   Indications: Essential hypertension (HRC) TAKE 1 TABLET BY MOUTH DAILY 90 Tablet  0 01/31/2024   Active       Past Medical History:    Centrilobular emphysema 12/13/2023     Overview:    Formatting of this note might be different from the original.  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  Added automatically from request for surgery [id number]   Essential hypertension 06/07/2003     Overview:    Formatting of this note might be different from the original.  Hypertension   Osteoarthritis 06/07/2003     Overview:    Formatting of this note might be different from the original.  DJD   Mixed hyperlipidemia       CAD (coronary artery disease)       Overview:    Formatting of this note might be different from the  original.  S/P STENT PLACEMENT 11/2000 in circumflex   PVD (peripheral vascular disease)       Overview:    Formatting of this note might be different from the original.  S/P STENTS TO BOTH COMMON ILIAC ARTERIES   Tobacco abuse       Chronic kidney disease, stage 3a         Resolved Problems    Resolved Problems  Problem Noted Date Diagnosed Date Resolved Date   Tobacco use disorder 06/07/2003 09/30/2019   Overview:    Formatting of this note might be different from the original.  Tobacco Abuse   Chronic ischemic heart disease 06/07/2003 09/30/2019   Overview:    Formatting of this note might be different from the original.  Ischemic Heart Disease   Hyperlipidemia 06/07/2003 09/30/2019       Past Surgical History:    Past Surgical History:   Procedure Laterality Date    APPENDECTOMY OPEN      No Comments Provided    ARTHROPLASTY KNEE      x7 knee surgeries.    HEART CATH, ANGIOPLASTY      No Comments Provided    OTHER SURGICAL HISTORY      [id number],OTHER    VASECTOMY      No Comments Provided        Physical Exam   Patient Vitals for the past 24 hrs:   BP Pulse SpO2   02/03/24 0945 109/63 88 99 %   02/03/24 0930 110/70 83 98 %   02/03/24 0915 111/66 85 99 %   02/03/24 0900 98/51 92 100 %        Physical Exam  General: Elderly male, laying on the stretcher  Eyes: PERRL, Conjunctive within normal limits.  No scleral icterus.  ENT: Moist mucous membranes, oropharynx clear.   CV: Normal S1S2, no murmur, rub or gallop. Regular rate and rhythm  Resp: Clear to auscultation bilaterally, no wheezes, rales or rhonchi. Normal respiratory effort.  GI: Abdomen is soft, nontender and nondistended. No palpable masses. No rebound or guarding.  Rectal: Normal tone.  Black stool in the rectal vault.  No visible bright red blood.  Stool leaking onto depends surrounded by red border, bloody smelling. Nontender.  MSK: No edema. Nontender. Normal active range of motion.  Skin: Warm and dry. No rashes or lesions or ecchymoses on  visible skin.  Neuro: Alert and oriented. Responds appropriately to all questions and commands. No focal findings appreciated. Normal muscle tone.  Psych: Blunted affect.  Normal mood.    Emergency Department Course   ECG    ECG results from 02/03/24   EKG 12 lead     Value    Systolic Blood Pressure     Diastolic Blood Pressure     Ventricular Rate 84    Atrial Rate     FL Interval     QRS Duration 98        QTc 479    P Axis     R AXIS 24    T Axis 64    Interpretation ECG      Atrial fibrillation with a competing junctional pacemaker  Possible Anterior infarct , age undetermined  Abnormal ECG  When compared with ECG of 19-NOV-2000 06:19,  Atrial fibrillation has replaced Sinus rhythm  Borderline criteria for Anterior infarct are now Present  T wave inversion now evident in Lateral leads     EKG 12-lead, tracing only     Value    Systolic Blood Pressure     Diastolic Blood Pressure     Ventricular Rate 82    Atrial Rate 76    FL Interval     QRS Duration 96        QTc 448    P Axis     R AXIS 19    T Axis 97    Interpretation ECG      Undetermined rhythm  Abnormal QRS-T angle, consider primary T wave abnormality  Abnormal ECG  When compared with ECG of 03-FEB-2024 10:29, (unconfirmed)  Current undetermined rhythm precludes rhythm comparison, needs review         Imaging:  CTA Abdomen Pelvis with Contrast   Final Result   IMPRESSION:   1.  Mild thickening and enhancement of the first and second portions of the duodenum. Differential considerations include duodenitis and duodenal ulcer disease. Consider endoscopy for further evaluation.   2.  Colonic diverticulosis, without evidence for diverticulitis.   3.  No definite site of active GI bleeding is identified.   4.  Prostatic enlargement.   5.  Large area of ill-defined lytic and sclerotic change involving the left iliac bone, suspicious for primary or metastatic malignancy.                Laboratory:  Labs Ordered and Resulted from Time of ED Arrival  to Time of ED Departure   COMPREHENSIVE METABOLIC PANEL - Abnormal       Result Value    Sodium 136      Potassium 5.8 (*)     Carbon Dioxide (CO2) 18 (*)     Anion Gap 14      Urea Nitrogen 76.1 (*)     Creatinine 1.67 (*)     GFR Estimate 40 (*)     Calcium 9.0      Chloride 104      Glucose 172 (*)     Alkaline Phosphatase 73      AST 13      ALT 13      Protein Total 5.9 (*)     Albumin 3.7      Bilirubin Total <0.2     INR - Abnormal    INR 1.18 (*)    LACTIC ACID WHOLE BLOOD - Abnormal    Lactic Acid 3.4 (*)    CBC WITH PLATELETS AND DIFFERENTIAL - Abnormal    WBC Count 15.1 (*)     RBC Count 3.01 (*)     Hemoglobin 9.2 (*)     Hematocrit 29.2 (*)     MCV 97      MCH 30.6      MCHC 31.5      RDW 14.3      Platelet Count 258      % Neutrophils 88      % Lymphocytes 4      % Monocytes 5      % Eosinophils 0      % Basophils 0      % Immature Granulocytes 3      NRBCs per 100 WBC 0      Absolute Neutrophils 13.2 (*)     Absolute Lymphocytes 0.6 (*)     Absolute Monocytes 0.8      Absolute Eosinophils 0.1      Absolute Basophils 0.0      Absolute Immature Granulocytes 0.4      Absolute NRBCs 0.0     ISTAT CREATININE POCT - Abnormal    Creatinine POCT 1.9 (*)     GFR, ESTIMATED POCT 34 (*)    POTASSIUM - Abnormal    Potassium 5.5 (*)    HEMOGLOBIN - Abnormal    Hemoglobin 7.5 (*)    LACTIC ACID WHOLE BLOOD - Abnormal    Lactic Acid 2.6 (*)    TROPONIN T, HIGH SENSITIVITY - Abnormal    Troponin T, High Sensitivity 42 (*)    TROPONIN T, HIGH SENSITIVITY - Abnormal    Troponin T, High Sensitivity 35 (*)    ROUTINE UA WITH MICROSCOPIC REFLEX TO CULTURE - Abnormal    Color Urine Straw      Appearance Urine Clear      Glucose Urine Negative      Bilirubin Urine Negative      Ketones Urine Negative      Specific Gravity Urine 1.032      Blood Urine Trace (*)     pH Urine 5.0      Protein Albumin Urine Negative      Urobilinogen Urine Normal      Nitrite Urine Negative      Leukocyte Esterase Urine Negative      Mucus  Urine Present (*)     RBC Urine 4 (*)     WBC Urine 1     PARTIAL THROMBOPLASTIN TIME - Normal    aPTT 26     LACTIC ACID WHOLE BLOOD - Normal    Lactic Acid 1.5     POTASSIUM - Normal    Potassium 4.9     TYPE AND SCREEN, ADULT    ABO/RH(D) O POS      Antibody Screen Negative      SPECIMEN EXPIRATION DATE [id number]     PREPARE RED BLOOD CELLS (UNIT)    Blood Component Type Red Blood Cells      Product Code T5928W90      Unit Status Transfused      Unit Number [id number]      CROSSMATCH Compatible      CODING SYSTEM EXSA427      ISSUE DATE AND TIME [id number]      UNIT ABO/RH O+      UNIT TYPE ISBT 5100     PREPARE RED BLOOD CELLS (UNIT)    Blood Component Type Red Blood Cells      Product Code S0148G28      Unit Status Released      Unit Number [id number]      CROSSMATCH Compatible      CODING SYSTEM DILK066      UNIT ABO/RH O+      UNIT TYPE ISBT 5100     ABO/RH TYPE AND SCREEN          Emergency Department Course & Assessments:    Interventions:  Medications   sodium chloride 0.9% BOLUS 1,000 mL (0 mLs Intravenous Stopped 2/3/24 1104)   iopamidol (ISOVUE-370) solution 500 mL (93 mLs Intravenous $Given 2/3/24 1020)   sodium chloride (PF) 0.9% PF flush 100 mL (71 mLs Intravenous $Given 2/3/24 1025)   pantoprazole (PROTONIX) IV push injection 40 mg (40 mg Intravenous $Given 2/3/24 1237)   sodium chloride 0.9% BOLUS 500 mL (500 mLs Intravenous $New Bag 2/3/24 1252)   lidocaine (XYLOCAINE) 2 % external gel 6 mL (6 mLs Urethral $Given 2/3/24 1310)   Packed RBCs 1 Unit IV     Assessments:  Past medical records, nursing notes, and vitals reviewed.  I performed an exam of the patient and obtained history, as documented above.   I reassessed the patient. He is noting mild chest discomfort. ECG obtained.  I reassessed the patient and discussed findings of today's evaluation.  He denies any new concerns.  I discussed plan for admission which he is agreeable to.  I discussed EGD study and my consult with GI.   All questions were answered.    Independent Interpretation (X-rays, CTs, rhythm strip):  None    Consultations/Discussion of Management or Tests:  I consulted with Dr. Duong of GI who will likely take the patient to ECG later today.  I discussed the patient with Verna Beard of the hospitalist service who accepted the patient for Dr. Desai.    Social Determinants of Health affecting care:   None    Disposition:  The patient was admitted to the hospital under the care of Dr. Martin.     Impression & Plan      Medical Decision Making:  Zack Booth is a 83 year old male who presents with melena.  Multiple etiologies considered, but ultimately this is consistent with an upper gastrointestinal bleed. Differential considered includes ulcer, gastritis, avm, tumor, esophagitis, variceal bleed, kiara-palmer tear, ingestion, etc. based on CT scan, duodenal source is likely.  Patient is  hemodynamically stable.  He developed some chest discomfort over his stay, assessed with ECG and troponin with no signs that suggest acute ischemia or injury.  No history of chronic liver disease and INR is normal. Protonix given.  No indication to start octreotide as my suspicion of variceal bleed is so low.    The patient did also have issues with urinating in the ED.  There was a blood clot noted in attempt at catheterization we are unable to pass and therefore had to place a Gordon catheter to reduce the patient's discomfort and obstruction.  This could have potentially been a single blood clot, definitive etiology unclear, no evidence of UTI or significant blood in the urine on analysis, will need further assessment on admission.  Given amount of bleeding and age/risk factors, I would hospitalize patient at this time. GI was consulted and will plan for EGD.  Admit to hospitalist for further care to [facility].  Type and cross completed. Blood was transfused in the ED.  Watched closely in ED for drop in BP or HR elevation.   All plans were  discussed with the patient.  He remained stable in the ED.  All questions were answered prior to admission.      Diagnosis:    ICD-10-CM       1. Acute GI bleeding  K92.2 pantoprazole (PROTONIX) 40 MG EC tablet      2. Anemia, unspecified type  D64.9 CANCELED: Home Care Referral      3. Chest tightness  R07.89       4. Elevated troponin  R79.89       5. Hyperkalemia  E87.5       6. Urinary obstruction  N13.9              2/3/2024   Marlen Yu MD Jonkman, Tracy Dianne, MD  02/07/24 1153

## 2024-02-03 NOTE — ANESTHESIA POSTPROCEDURE EVALUATION
Patient: Zack Booth    Procedure: Procedure(s):  Esophagoscopy, gastroscopy, duodenoscopy (EGD), combined       Anesthesia Type:  MAC    Note:  Disposition: Inpatient   Postop Pain Control: Uneventful            Sign Out: Well controlled pain   PONV: No   Neuro/Psych: Uneventful            Sign Out: Acceptable/Baseline neuro status   Airway/Respiratory: Uneventful            Sign Out: Acceptable/Baseline resp. status   CV/Hemodynamics: Uneventful            Sign Out: Acceptable CV status; No obvious hypovolemia; No obvious fluid overload   Other NRE: NONE   DID A NON-ROUTINE EVENT OCCUR? No           Last vitals:  Vitals Value Taken Time   /89 02/03/24 1735   Temp 96.9  F (36.1  C) 02/03/24 1702   Pulse 85 02/03/24 1735   Resp 12 02/03/24 1720   SpO2 98 % 02/03/24 1741   Vitals shown include unfiled device data.    Electronically Signed By: Mireille Jones MD  February 3, 2024  5:42 PM

## 2024-02-03 NOTE — ANESTHESIA CARE TRANSFER NOTE
Patient: Zack Booth    Procedure: Procedure(s):  Esophagoscopy, gastroscopy, duodenoscopy (EGD), combined       Diagnosis: GI bleeding [K92.2]  Diagnosis Additional Information: No value filed.    Anesthesia Type:   MAC     Note:    Oropharynx: oropharynx clear of all foreign objects  Level of Consciousness: awake and drowsy  Oxygen Supplementation: room air    Independent Airway: airway patency satisfactory and stable  Dentition: dentition unchanged  Vital Signs Stable: post-procedure vital signs reviewed and stable  Report to RN Given: handoff report given  Patient transferred to: PACU    Handoff Report: Identifed the Patient, Identified the Reponsible Provider, Reviewed the pertinent medical history, Discussed the surgical course, Reviewed Intra-OP anesthesia mangement and issues during anesthesia, Set expectations for post-procedure period and Allowed opportunity for questions and acknowledgement of understanding      Vitals:  Vitals Value Taken Time   BP 82/54 02/03/24 1705   Temp     Pulse 89 02/03/24 1705   Resp     SpO2 98 % 02/03/24 1706   Vitals shown include unfiled device data.    Electronically Signed By: ANGEL Reilly CRNA  February 3, 2024  5:07 PM

## 2024-02-03 NOTE — PHARMACY-ADMISSION MEDICATION HISTORY
Pharmacy Intern Admission Medication History    Admission medication history is complete. The information provided in this note is only as accurate as the sources available at the time of the update.    Information Source(s): Family member via phone    Pertinent Information: NA    Changes made to PTA medication list:  Added: tylenol, vitamin B12, hydromorphone, lidocaine 4% patch, Senna-docusate   Deleted: Tylenol 3, atenolol, atorvastatin, cholecalciferol, hydrochlorothiazide, Vicodin, lisinopril, omeprazole, percocet, propoxyphene N-acetaminophen, warfarin  Changed: Spironolactone 25 mg daily > 100 mg daily    Allergies reviewed with patient and updates made in EHR: yes    Medication History Completed By: Megan Rodríguez 2/3/2024 12:53 PM    PTA Med List   Medication Sig Last Dose    acetaminophen (TYLENOL) 500 MG tablet Take 1,000 mg by mouth 3 times daily Unknown    amLODIPine (NORVASC) 10 MG tablet Take 10 mg by mouth daily 2/3/2024 at AM    aspirin (ASA) 325 MG EC tablet Take 325 mg by mouth daily 2/3/2024 at AM    cyanocobalamin (VITAMIN B-12) 1000 MCG tablet Take 1,000 mcg by mouth daily Unknown    HYDROmorphone (DILAUDID) 2 MG tablet Take 1-2 mg by mouth every 4 hours as needed for severe pain Unknown    Lidocaine (LIDOCARE) 4 % Patch Place 1 patch onto the skin every 24 hours To prevent lidocaine toxicity, patient should be patch free for 12 hrs daily. Unknown    nitroGLYcerin (NITROSTAT) 0.4 MG sublingual tablet Place 0.4 mg under the tongue every 5 minutes as needed for chest pain For chest pain place 1 tablet under the tongue every 5 minutes for 3 doses. If symptoms persist 5 minutes after 1st dose call 911. Unknown    senna-docusate (SENOKOT-S/PERICOLACE) 8.6-50 MG tablet Take 1 tablet by mouth 2 times daily Unknown    spironolactone (ALDACTONE) 100 MG tablet Take 100 mg by mouth daily 2/3/2024 at AM

## 2024-02-03 NOTE — ED NOTES
Gordon successful, no complication during insertion. Local anaesthesia applied prior to insertion, pt tolerated well

## 2024-02-03 NOTE — ED TRIAGE NOTES
Pt BIBA for evaluation of blood tony stool. Pt is a stage IV lung cancer pt , received first infusion yesterday, was told to watch for black tony stool as the side effect. Pt's wife noted pt have one episode of black tony stool this morning. Pt arrived, A&Ox4, has no pain complain at the moment, pt is soiled with bloody stool down to lower extremities. Pt's ABC intact      Triage Assessment (Adult)       Row Name 02/03/24 0907          Triage Assessment    Airway WDL WDL        Respiratory WDL    Respiratory WDL WDL        Skin Circulation/Temperature WDL    Skin Circulation/Temperature WDL WDL        Cardiac WDL    Cardiac WDL WDL        Peripheral/Neurovascular WDL    Peripheral Neurovascular WDL WDL        Cognitive/Neuro/Behavioral WDL    Cognitive/Neuro/Behavioral WDL WDL

## 2024-02-03 NOTE — ANESTHESIA PREPROCEDURE EVALUATION
Anesthesia Pre-Procedure Evaluation    Patient: Zack Booth   MRN: [id number] : 1940        Procedure : Procedure(s):  Esophagoscopy, gastroscopy, duodenoscopy (EGD), combined  possible Sigmoidoscopy flexible          Past Medical History:   Diagnosis Date    Chronic kidney disease, stage III (moderate) (H)     3/4/2015    Congenital renal agenesis and dysgenesis     3/4/2015,Hospitalized following over-medication with Atenolol.    Coronary atherosclerosis     3/4/2015    Disturbance of skin sensation     3/4/2015    Essential hypertension     3/4/2015    Other and unspecified hyperlipidemia     3/4/2015    Other symptoms involving nervous and musculoskeletal systems     3/4/2015    Postsurgical percutaneous transluminal coronary angioplasty status     3/4/2015      Past Surgical History:   Procedure Laterality Date    APPENDECTOMY OPEN      No Comments Provided    ARTHROPLASTY KNEE      x7 knee surgeries.    HEART CATH, ANGIOPLASTY      No Comments Provided    OTHER SURGICAL HISTORY      [id number],OTHER    VASECTOMY      No Comments Provided      Allergies   Allergen Reactions    Atenolol      Other reaction(s): Bradycardia    Lisinopril      Other reaction(s): *Unknown    Statins Muscle Pain (Myalgia)     Bad dreams    Ibuprofen      Other reaction(s): Headache      Social History     Tobacco Use    Smoking status: Every Day     Packs/day: 1.00     Years: 70.00     Additional pack years: 0.00     Total pack years: 70.00     Types: Cigarettes    Smokeless tobacco: Never    Tobacco comments:     Quit smoking: -- started at about age 5 years old   Substance Use Topics    Alcohol use: No     Comment: Alcoholic Drinks/day: maybe 1 glass of wine at holiday.      Wt Readings from Last 1 Encounters:   03/04/15 84.7 kg (186 lb 12.8 oz)        Anesthesia Evaluation            ROS/MED HX  ENT/Pulmonary:  - neg pulmonary ROS     Neurologic:  - neg neurologic ROS     Cardiovascular:     (+)  hypertension- -  CAD -  - -  "                                     METS/Exercise Tolerance:     Hematologic:     (+)      anemia,          Musculoskeletal:  - neg musculoskeletal ROS     GI/Hepatic: Comment: GI bleed      Renal/Genitourinary:     (+) renal disease, type: ARF, Pt does not require dialysis,           Endo:  - neg endo ROS     Psychiatric/Substance Use:  - neg psychiatric ROS     Infectious Disease:  - neg infectious disease ROS     Malignancy:   (+) Malignancy, History of Lung.    Other:  - neg other ROS          Physical Exam    Airway        Mallampati: II   TM distance: > 3 FB   Neck ROM: full   Mouth opening: > 3 cm    Respiratory Devices and Support         Dental       (+) Modest Abnormalities - crowns, retainers, 1 or 2 missing teeth      Cardiovascular   cardiovascular exam normal       Rhythm and rate: regular and normal     Pulmonary   pulmonary exam normal        breath sounds clear to auscultation           OUTSIDE LABS:  CBC:   Lab Results   Component Value Date    WBC 15.1 (H) 02/03/2024    HGB 7.5 (L) 02/03/2024    HGB 9.2 (L) 02/03/2024    HCT 29.2 (L) 02/03/2024    HCT 53.7 (H) 03/04/2015     02/03/2024     03/04/2015     BMP:   Lab Results   Component Value Date     02/03/2024     03/04/2015    POTASSIUM 4.9 02/03/2024    POTASSIUM 5.5 (H) 02/03/2024    CHLORIDE 104 02/03/2024    CHLORIDE 102 03/04/2015    CO2 18 (L) 02/03/2024    CO2 31 03/04/2015    BUN 76.1 (H) 02/03/2024    BUN 22 03/04/2015    CR 1.9 (H) 02/03/2024    CR 1.67 (H) 02/03/2024     (H) 02/03/2024     03/04/2015     COAGS:   Lab Results   Component Value Date    PTT 26 02/03/2024    INR 1.18 (H) 02/03/2024     POC: No results found for: \"BGM\", \"HCG\", \"HCGS\"  HEPATIC:   Lab Results   Component Value Date    ALBUMIN 3.7 02/03/2024    PROTTOTAL 5.9 (L) 02/03/2024    ALT 13 02/03/2024    AST 13 02/03/2024    ALKPHOS 73 02/03/2024    BILITOTAL <0.2 02/03/2024     OTHER:   Lab Results   Component Value Date    " LACT 2.6 (H) 02/03/2024    KAUR 9.0 02/03/2024       Anesthesia Plan    ASA Status:  3, emergent    NPO Status:  NPO Appropriate    Anesthesia Type: MAC.   Induction: Intravenous.   Maintenance: TIVA.        Consents    Anesthesia Plan(s) and associated risks, benefits, and realistic alternatives discussed. Questions answered and patient/representative(s) expressed understanding.     - Discussed: Risks, Benefits and Alternatives for BOTH SEDATION and the PROCEDURE were discussed     - Discussed with:  Patient       - Patient is DNR/DNI Status: Yes             Suspend during perioperative period? Yes.             Agree to: Other.   Use of blood products discussed: Yes.     - Discussed with: Patient.     - Consented: consented to blood products            Reason for refusal: other.     Postoperative Care    Pain management: IV analgesics, Oral pain medications.   PONV prophylaxis: Ondansetron (or other 5HT-3), Dexamethasone or Solumedrol     Comments:    Other Comments: Lower GIB, Hgb 9 to 7.5, transfusing 1 unit pRBCs in pre-op. DNR/DNI; patient wishes to keep DNR status for procedure, but will reverse DNI if medically necessary.    Plan: MAC with propofol infusion. Ok with reversing DNI if medically necessary. No nausea or vomiting.           Mireille Jones MD    I have reviewed the pertinent notes and labs in the chart from the past 30 days and (re)examined the patient.  Any updates or changes from those notes are reflected in this note.    # Hyperkalemia: Highest K = 5.8 mmol/L in last 2 days, will monitor as appropriate         # Coagulation Defect: INR = 1.18 (Ref range: 0.85 - 1.15) and/or PTT = 26 Seconds (Ref range: 22 - 38 Seconds), will monitor for bleeding  # Drug Induced Platelet Defect: home medication list includes an antiplatelet medication

## 2024-02-03 NOTE — CARE PLAN
Was called by ER provider regarding suspected GI bleed.  Based on history, labs and imaging, does appear to be upper GI bleed, likely duodenal source-erosion/ulcer.  Plan for EGD later this afternoon.,  Protonix push;  Transfuse as needed to keep hemoglobin above 8.

## 2024-02-04 NOTE — PROGRESS NOTES
"GASTROENTEROLOGY PROGRESS NOTE       SUBJECTIVE   No abdominal pain  H&H stable, no bowel movements since last night.     OBJECTIVE     Vitals /68 (BP Location: Left arm)   Pulse 95   Temp 97.7  F (36.5  C) (Oral)   Resp 20   Ht 1.74 m (5' 8.5\")   Wt 68.8 kg (151 lb 10.8 oz)   SpO2 97%   BMI 22.72 kg/m            Physical Exam  General: awake, alert, oriented X3 family at bedside        LABORATORY AND IMAGING    ELECTROLYTE PANEL   Recent Labs   Lab Test 02/04/24  0558 02/03/24  1238 02/03/24  1104 02/03/24  0911   POTASSIUM 4.3 4.9 5.5* 5.8*   BUN 44.0*  --   --  76.1*      HEMATOLOGY PANEL   Recent Labs   Lab Test 02/04/24  0558 02/04/24  0020 02/03/24  1806 02/03/24  1104 02/03/24  0911   WBC 12.2*  --   --   --  15.1*   HGB 8.0*  8.0* 8.8* 9.0*   < > 9.2*   MCV 99  --   --   --  97     --   --   --  258   INR  --   --   --   --  1.18*    < > = values in this interval not displayed.      LIVER AND PANCREAS PANEL   Recent Labs   Lab Test 02/03/24  1331 02/03/24  0911   ALBUMIN  --  3.7   PROTEIN Negative  --    BILITOTAL  --  <0.2   ALT  --  13   AST  --  13      I have reviewed the current diagnostic and laboratory tests.     IMPRESSION/PLAN   Upper GI bleed secondary to duodenal ulcers    Avoid NSAID use   Omeprazole 40 Mg daily for 3 months and then 20 Mg indefinitely afterwards as long as using NSAIDs or needing chemotherapy  Advance diet as tolerated  Okay to discharge from GI standpoint       Kavin Duong MD       [facility]   [phone]  I appreciate the opportunity to participate in the care of this patient. Please feel free to call me with any questions.       "

## 2024-02-04 NOTE — PROVIDER NOTIFICATION
Pt. currently in A.fib, but Pt. had a 6 beat run of Vtach and also had a 2 second pause, Pt. denies any chest pain or SOB. May have undiagnosed sleep apnea but maintains sat's at 98-99%, on room air.

## 2024-02-04 NOTE — PLAN OF CARE
"Goal Outcome Evaluation:      Plan of Care Reviewed With: patient      Temp: 97.4  F (36.3  C) Temp src: Axillary BP: 111/59 Pulse: 77   Resp: 19 SpO2: 99 % O2 Device: None (Room air)        Pt A/O x3, disoriented to situation. Pleasant and cooperative. IM status for close monitoring. Still drowsy after EGD procedure, sleeps through most of PM shift. VSS, afebrile. LS dim and clear, 97-99% on RA. On IVF - NS running @ 100 ml/hr. IV Protonix gtt infusing @ 8mg/hr. On tele: A-fib CVR. Gordon in place for urinary retention, adequate UOP. Diet advanced to clear liquid. Chemo precautions maintained. Will continue POC.       Problem: Adult Inpatient Plan of Care  Goal: Plan of Care Review  Description: The Plan of Care Review/Shift note should be completed every shift.  The Outcome Evaluation is a brief statement about your assessment that the patient is improving, declining, or no change.  This information will be displayed automatically on your shift  note.  Outcome: Progressing  Flowsheets (Taken 2/4/2024 0025)  Plan of Care Reviewed With: patient  Goal: Patient-Specific Goal (Individualized)  Description: You can add care plan individualizations to a care plan. Examples of Individualization might be:  \"Parent requests to be called daily at 9am for status\", \"I have a hard time hearing out of my right ear\", or \"Do not touch me to wake me up as it startles  me\".  Outcome: Progressing  Goal: Absence of Hospital-Acquired Illness or Injury  Outcome: Progressing  Intervention: Prevent Skin Injury  Recent Flowsheet Documentation  Taken 2/3/2024 1838 by Anat Hernandez RN  Body Position:   supine, legs elevated   supine, head elevated  Goal: Optimal Comfort and Wellbeing  Outcome: Progressing  Goal: Readiness for Transition of Care  Outcome: Progressing     Problem: Gastrointestinal Bleeding  Goal: Optimal Coping with Acute Illness  Outcome: Progressing  Goal: Hemostasis  Outcome: Progressing     "

## 2024-02-04 NOTE — PROGRESS NOTES
Notified provider about indwelling kee catheter discussed removal or continued need.    Did provider choose to remove indwelling kee catheter? NO    Provider's kee indication for keeping indwelling kee catheter: Indication for continued use: Retention    Is there an order for indwelling kee catheter? YES    *If there is a plan to keep kee catheter in place at discharge daily notification with provider is not necessary, but please add a notation in the treatment team sticky note that the patient will be discharging with the catheter.

## 2024-02-04 NOTE — PROGRESS NOTES
[facility]    Medicine Progress Note - Hospitalist Service    Date of Admission:  2/3/2024    Assessment & Plan    Zack Booth is a 83 year old male a history of HTN, Prediabetes, PAD, HLD, CAD, OA, Atrial Fibrillation not on anticoagulation, Metastatic Adenocarcinoma of the Lung currently on Ketruda who presents to the ED today with black colored stools.     Acute GI Bleed  Acute Blood Loss Anemia  Pt presents with acute onset of black tarry stools this morning.  Denies any abdominal pain.  Currently on Keytruda for treatment of lung cancer and is on ASA daily for hx of CAD.  Hemodynamically stable.  Hgb on arrival 9.2 down to 7.5 on repeat two hours later.  CTA abd/pelvis with no definite site of active GI bleeding identified, but revealed mild thickening and enhancement of the first and second portions of the duodenum.  - IVF, antiemetics, ADAT, analgesics prn  -Transfused 2 units prbc on admission  -Continue serial hgb levels  -Oral Protonix, discontinue IV Protonix  - GI consulted. EGD shows duodenal PUD with signs of possible recent bleeding but not active at the moment of the scope.  Gastritis also seen.         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  Diagnosed 12/2023.  Currently on Keytruda with last infusion 2/1/24.  I asked pharmacy to review the literature, there is report of bleeding in endometrial cancer but not gastrointestinal bleeding.    Chronic COPD  Tobacco Abuse  No signs of acute exacerbation     CAD  HTN  Hx of PCI ~2000.  In 2013, he underwent cardiac catheterization and had a stent placed in his circumflex coronary. He did have a lesion of his LAD which was 60 to 70% and total occlusion of his right coronary artery.  Pt denies any chest pain.  Troponin flat 42/35   -We will progressively resume amlodipine and Spironolactone.  Continue holding aspirin in the setting of GIB     Hx SSS and Atrial  Fibrillation  Per chart review, diagnosed in 2013.  PPM was recommended of which patient declined.  On ASA only.     CKD  Creatinine on admission 1.67 at baseline (1.4-1.8)  - IVF hydration     CODE: DNR/DNI confirmed with patient and his 2 daughters at the bedside  Diet/IVF: npo, NS  GI ppx:  protonix  DVT ppx: SCD         Diet: Clear Liquid Diet    DVT Prophylaxis: Pneumatic Compression Devices  Gordon Catheter: PRESENT, indication: Retention  Lines: None     Cardiac Monitoring: None  Code Status: No CPR- Do NOT Intubate      Clinically Significant Risk Factors Present on Admission        # Hyperkalemia: Highest K = 5.8 mmol/L in last 2 days, will monitor as appropriate   # Hypocalcemia: Lowest Ca = 8 mg/dL in last 2 days, will monitor and replace as appropriate      # Coagulation Defect: INR = 1.18 (Ref range: 0.85 - 1.15) and/or PTT = 26 Seconds (Ref range: 22 - 38 Seconds), will monitor for bleeding  # Drug Induced Platelet Defect: home medication list includes an antiplatelet medication   # Hypertension: Noted on problem list                 Disposition Plan     Expected Discharge Date: 02/05/2024                    Martín Tate MD  Hospitalist Service  [facility]  Securely message with [employer] (more info)  Text page via [employer] Paging/Directory   ______________________________________________________________________    Interval History   Patient is feeling better, no more black tarry stools, no other significant symptoms.  Hemoglobin has been stable after transfusion    Physical Exam   Vital Signs: Temp: 97.7  F (36.5  C) Temp src: Oral BP: 126/68 Pulse: 95   Resp: 20 SpO2: 97 % O2 Device: None (Room air)    Weight: 151 lbs 10.82 oz    Constitutional: awake, alert, cooperative, no apparent distress, and appears stated age  Respiratory: no increased work of breathing  Cardiovascular: IRIR, LOAN II/VI LSB   GI: No scars, normal bowel sounds, soft, non-distended, non-tender, no masses  palpated, no hepatosplenomegally    Medical Decision Making       50 MINUTES SPENT BY ME on the date of service doing chart review, history, exam, documentation & further activities per the note.      Data     I have personally reviewed the following data over the past 24 hrs:    12.2 (H)  \   8.6 (L)   / 162     138 112 (H) 44.0 (H) /  83   4.3 17 (L) 1.28 (H) \       Imaging results reviewed over the past 24 hrs:   No results found for this or any previous visit (from the past 24 hour(s)).

## 2024-02-04 NOTE — PROGRESS NOTES
02/04/24 1330   Appointment Info   Signing Clinician's Name / Credentials (PT) Megan Keith, DPT   Living Environment   People in Home spouse   Current Living Arrangements independent living facility   Home Accessibility no concerns   Transportation Anticipated family or friend will provide   Living Environment Comments Pt is a questionable historian, no family present. Pt lives in accessible senior apartment with elevator access. Patient and spouse both use a cane, has tub/shower with grab bars.   Self-Care   Usual Activity Tolerance good   Current Activity Tolerance moderate   Equipment Currently Used at Home cane, straight   Fall history within last six months yes   Number of times patient has fallen within last six months 1   Activity/Exercise/Self-Care Comment Pt is a questionable historian; pt reports he is independent with all ADLs and does his own medication, drives. Lives with spouse Cami who also uses a cane. Per RN, pt has daugther that lives in [place] who is supportive and helps manage cares.   General Information   Onset of Illness/Injury or Date of Surgery 02/03/24   Referring Physician Martín Tate MD   Patient/Family Therapy Goals Statement (PT) return home; declines need for home PT or walker use   Pertinent History of Current Problem (include personal factors and/or comorbidities that impact the POC) 83 year old male admit with upper GI bleed. PMH: HTN, Prediabetes, PAD, HLD, CAD, OA, Atrial Fibrillation not on anticoagulation, metastatic lung cancer currently on Ketruda who presents to the ED today with black colored stools.   Existing Precautions/Restrictions fall   Weight-Bearing Status - LUE full weight-bearing   Weight-Bearing Status - RUE full weight-bearing   Weight-Bearing Status - LLE full weight-bearing   Weight-Bearing Status - RLE full weight-bearing   Cognition   Safety Deficit (Cognition) insight into deficits/self-awareness;safety precautions  follow-through/compliance;judgment   Pain Assessment   Patient Currently in Pain No   Strength (Manual Muscle Testing)   Strength (Manual Muscle Testing) No deficits observed during functional mobility   Bed Mobility   Comment, (Bed Mobility) not assessed   Transfers   Comment, (Transfers) SBA with FWW   Gait/Stairs (Locomotion)   [place] Level (Gait) supervision   Assistive Device (Gait) walker, front-wheeled   Distance in Feet (Gait) 15'   Comment, (Gait/Stairs) heavy UE use on FWW   Balance   Balance Comments requires use of FWw today; normally uses cane. Pt declines cane trial today d/t fatigue   Sensory Examination   Sensory Perception patient reports no sensory changes   Clinical Impression   Criteria for Skilled Therapeutic Intervention Yes, treatment indicated   PT Diagnosis (PT) impaired mobility   Influenced by the following impairments impaired balance, weakness   Functional limitations due to impairments fall risk, decreased activity tolerance   Clinical Presentation (PT Evaluation Complexity) stable   Clinical Presentation Rationale clinical judgement   Clinical Decision Making (Complexity) low complexity   Planned Therapy Interventions (PT) balance training;bed mobility training;gait training;neuromuscular re-education;patient/family education;stair training;strengthening;transfer training;progressive activity/exercise   Risk & Benefits of therapy have been explained evaluation/treatment results reviewed;care plan/treatment goals reviewed;risks/benefits reviewed;current/potential barriers reviewed;participants voiced agreement with care plan;participants included;patient   PT Total Evaluation Time   PT Eval, Low Complexity Minutes (94364) 15   Physical Therapy Goals   PT Frequency Daily   PT Predicted Duration/Target Date for Goal Attainment 02/09/24   PT Goals Bed Mobility;Transfers;Gait   PT: Bed Mobility Independent   PT: Transfers Modified independent;Sit to/from stand;Bed to/from  "chair;Assistive device   PT: Gait Modified independent;Straight cane;100 feet   Interventions   Interventions Quick Adds Gait Training   Gait Training   Gait Training Minutes (84375) 12   Symptoms Noted During/After Treatment (Gait Training) fatigue   Treatment Detail/Skilled Intervention gait training with FWW in hallway, encouraged improved upright gaze and lessening UE  on FWW as he was heavily relying on arms for support. Pt able to ease UE use and gait speed slowed, but overall steady. Due to fatigue after 200 feet declined trial of cane despite encouragement. Pt up in chair with all needs in reach and chair alarm on.   Distance in Feet 200'   [place] Level (Gait Training) stand-by assist   Assistive Device (Gait Training) rolling walker   PT Discharge Planning   PT Plan trial ambulation with cane (pt has his own in room), if family present confirm baseline information provided by patient, encourage HHPT and walker use for improved safety   PT Discharge Recommendation (DC Rec) home with assist;home with home care physical therapy   PT Rationale for DC Rec Pt appears close to baseline, ambulating SBA with FWW ~ 200 feet. Unable to assess safety with cane use d/t pt being too fatigued to try today and anticipate pt would benefit from FWW use at home as well as HHPT to address strength, balance, and activity tolerance limitations. Pt appears forgetful and demonstrates impaired safety judgement declining need for walker since \"it just gets in the way\" or need for HHPT. Will continue to assess. If pt does not progress to Mod I with cane use, may needs to consider TCU since his spouse also uses cane and most likely would not be able to provide a lot of assist.   PT Brief overview of current status SBA with FWW; declined cane trial today   PT Equipment Needed at Discharge walker, rolling  (pt reports he has walker at home but \"it gets in the way\")   Total Session Time   Timed Code Treatment Minutes 12   Total " Session Time (sum of timed and untimed services) 27

## 2024-02-04 NOTE — PLAN OF CARE
Goal Outcome Evaluation:      Plan of Care Reviewed With: patient    Overall Patient Progress: no changeOverall Patient Progress: no change    Pt. Continues on Chemo precautions and IMC for closer monitoring.  Pt. Alert, drowsy overnight but more alert this morning. Tele: RACHNAFib with CVR, has been having pauses up to 2.4 seconds and did have a 6 beat run of V-Tach, MD notified and pt. To have mag level drawn this AM. Pt. Did have his HR drop to 40 very briefly then back to 70's. Pt. Denied any c/o chest pain or SOB. Pt. Continues on protonix drip, and IVF infusing without difficulty. Lung sounds clear, room air sat's at 98-99%. Gordon draining to gravity. Pt. Denies any needs at this time. Will continue with POC.

## 2024-02-04 NOTE — PLAN OF CARE
Cared for [id number]    Pt A/O x 4 (forgetful), VSS; Pt denies pain, headache, dizziness, N/V & SOB. Pt up Asst: 1 w/gait belt & walker. Lung sounds clear, RA. Protonix gtt discontinued. Gordon in-place for retention. HBG levels low upon arrival, 2 units of blood given in ED; hbg levels today 8.0, 8.6. GI, PT, OT & Social Work following. Plan to discharge 1-2 days. Will continue with plan of care.     Your New Baby    Congratulations on the new addition to your family. Now that you are home with your baby, you are likely to have lots of questions. Fortunately, there are many resources available to you, including books, websites, and handouts you received at the hospital.  In addition, Iâm sure you will find more than a few of your friends and family trying to offer advice as well. The following are some starting tips for common questions in the first few weeks. Keep in mind, that your pediatrician is always a source of help, so donât hesitate to call. Feeding your baby    â¢ Breastfeeding exclusively during the first 4 to 6 months of life provides ideal nutrition and supports the best possible growth and development. For mothers who choose not to breastfeed, babies should be given an iron-fortified formula during the first year of life. â¢ Healthy babies do not require extra water. Breast milk and formula (when properly prepared) are adequate to meet the newbornâs fluid needs. â¢ Feed your baby when he or she is hungry. A babyâs usual signs of hunger include putting her hand to her mouth, sucking, rooting, facial grimaces, and fussing. Crying is a late sign of hunger. You can avoid crying by responding to the babyâs more subtle cues. Once a baby is crying, feeding may become more difficult. Keeping her close by (rooming in) will make it easier for you to recognize the early feeding cues. â¢ In the first days of life, your baby should be encouraged to breastfeed about 8 to 12 times in a 24 hour period. â¢ At about 3 to 4 days after birth, babies go through a âfeeding frenzyâ where they want to eat every 1 to 2 hours. This is when they begin to make up for the weight loss that happens right after birth. As your milk supply comes in, you will provide enough breast milk to meet your babyâs needs.     â¢ At about 1 week of age, your baby should settle into a more typical breastfeeding routine of every 2 to 3 hours in the daytime, and every 3 hours at night with one longer 4- to 5-hour stretch between feedings. If she is sleeping more than 4 hours at a time, she should be awakened for feeding during the first 2 weeks. â¢ Feed your baby until he or she seems full. Signs of fullness are turning the head away from nipple, closing the mouth, and relaxed hands. â¢ A  is often very sleepy. She may need gentle stimulation (such as rocking, patting, or stroking) and time to come to an alert state for feeding. These movements also are helpful for consoling your baby. â¢ Burp your baby at natural breaks (e.g., midway through or after a feeding) by gently rubbing or patting her back while holding her against your shoulder and chest or supporting her in a sitting position on your lap. Breastfeeding    â¢ Breastfeeding should not hurt, and pain is a warning sign that something is not right. You may experience nipple tenderness at first, but this should be mild. Anything other than mild tenderness should be evaluated. If you are not getting enough sleep because of pain related to the birth or to breastfeeding (e.g., engorged breasts or nipple soreness), please let us know. We may suggest getting help from a lactation professional to make sure your baby is latching on correctly. â¢ Continue to take your prenatal vitamin or a multivitamin every day. If you are vegetarian, make sure the supplement contains iron, zinc, and vitamin B12. If you are vegan, it is essential that the supplement contains vitamin B12. â¢ Most medications are compatible with breastfeeding but check with your doctor first.    â¢ Because alcohol is passed into the breast milk, it is important for mothers to avoid alcohol for 2 to 3 hours before breastfeeding or during breastfeeding.  This also means that, because newborns breastfeed so frequently (every 2 to 3 hours), a mother most likely will have to avoid alcohol during the first several months of her babyâs life. â¢ If you are breastfeeding, try to wait a few weeks before introducing a pacifier to ensure that breastfeeding is firmly established. Storing Breast Milk     Itâs best for your baby to be given fresh breast milk that has just been pumped. But sometimes this isnât practical. If so, frozen breast milk is the next best thing. To store breast milk:     Use sterile containers. An NICU nurse can provide these or tell you where to get them. Label each bottle as instructed by the nurse. Unless told otherwise, you should write the babyâs name, the date and time the milk was pumped, whether the milk was fresh or frozen. Also list any medications you are taking. Donât mix milk from different pumping sessions unless told to do so. Keep storage times in mind. Hospitals may have their own process for storing human breast milk for hospitalized infants. In general, the following storage times are recommended for NICU patients:    Room temperature  77Â°F to 79Â°F  25Â°C to [facility]  Use within 4 hours    Refrigeration  35Â°F to 40Â°F  1Â°C to 4Â°C  Use between 4 and 48 hours is ideal,   but storing up to 96 hours is acceptable    Frozen  0Â°F to 4Â°F  -18Â°C to -20Â°C  Use between 48 hours and 3 months        Formula Feeding    â¢ A  who is growing appropriately will average 20 oz of formula per day. â¢ Prepare 2 oz of infant formula every 2 to 3 hours at first.  Provide more if your baby still seems hungry. As your babyâs appetite increases over time, you will need to prepare and offer larger quantities of formula. Donât worry about giving âtoo muchâ. â¢ Carefully read the instructions on the formula container. It will give you important information about how to prepare the formula and store it safely. Talk with me or another health care professional if you have any questions about how to prepare formula or before switching to a different kind of formula.     â¢ It is important for you to hold your baby close when feeding, in a semi-upright position, so that you are able to sense her behavioral cues of hunger, being full, comfort, and distress. Hold your baby so you can look into her eyes during feeding. â¢ When you feed your baby with a bottle, do not prop the bottle in her mouth. Propping increases the risk that she may choke, get an ear infection, and develop cavities. Holding your baby in your arms and holding the bottle for her gives you a wonderful opportunity for warm and loving interaction with her. Urinating and stooling    â¢ Your baby should have about 6 to 8 wet diapers in 24 hours after the first few days of life. â¢ She may have stools as frequently as one per feeding or she may go for a number of days without a stool. If you are breastfeeding, your babyâs stools will be loose and seedy. This is normal and is not diarrhea. â¢ Most babies use 8 to 12 diapers a day. Sleep    â¢ Back to sleep - Always put your baby down to sleep on his back, not on his tummy or side. Ask your relatives and caregivers also to put your baby âback to sleep. â    â¢ Experts also recommend that your baby sleep in your room in his own crib (not in your bed). If you breastfeed or bottle-feed your baby in your bed, return him to his own crib or bassinet when you both are ready to go back to sleep. â¢ Bumper pads are bad - Do not use loose, soft bedding (blankets, comforters, sheepskins, quilts, pillows, pillow-like bumper pads) or soft toys in the babyâs crib, because they are associated with an increased risk of SIDS. Thin blankets can be used to swaddle the baby, or in a crib if the blankets are tucked in under the crib mattress. â¢ Pacifiers are good - Using a pacifier during sleep is strongly associated with a reduced risk of SIDS. Consider offering a pacifier when your baby lies down for sleep.  Never reinsert the pacifier if it falls out after the baby falls asleep, and do not coat it with a sweet solution. If you are breastfeeding, try to wait a few weeks to introduce a pacifier to ensure that breastfeeding is firmly established. â¢ Share a bedroom with parents, but not the same sleeping surface, preferably until the baby turns 1 but at least for the first six months. Room-sharing decreases the risk of SIDS by as much as 50 percent. â¢ The room temperature should be comfortable and the baby should be kept from getting too warm or too cold while sleeping. â¢ Be sure your babyâs crib is safe. The slats should be no more than 2 3/8 inches (60 mm) apart. The mattress should be firm and fit snugly into the crib. Keep the sides of the crib raised when the baby is sleeping in it. Be sure the crib is certified by the [facility]. â¢ If you use a mesh playpen or portable crib, the weave should have small openings less than 1/4 inch (6 mm). â¢ Never leave your baby in a mesh playpen or crib with the drop-side down. What should your baby be doing?    â¢ Your baby should:  o have periods of wakefulness  o respond to your voice and touch  o be able to be calmed when picked up  o look at parents when awake  o move in response to visual or auditory stimuli    â¢ Your baby already is beginning to know you. He likes it when you hold him, feed him, and talk to him. You will soon learn what your baby is trying to tell you when he cries, looks at you, turns away, or smiles. â¢ Make touching your baby (caressing, massaging, holding, carrying, and rocking) an important part of all the everyday care activities of feeding, diapering, bathing, and bedtime. This physical contact helps your baby feel secure and understand that he is loved and cared for. â¢ As you try to console your baby, you will begin to recognize that he may not always be consolable. Actions such as stroking your babyâs head or gentle, repetitive rocking may help you calm him. â¢ Your babyâs head is fragile.  It is very important to never shake your baby because of the damage this can cause to his head. Safety    â¢ One of the most important steps in keeping your baby healthy is to wash your hands frequently with soap and water or a non-water antiseptic, always after diaper changes and before feeding your baby. Ask all family members and guests to wash their hands before handling the baby. â¢ Newborns are susceptible to illnesses in the first few months of life and need to be protected from anyone with colds or other illnesses. Outings to saige based activities, restaurants, and movies should be considered carefully and avoided during cold and flu season. â¢ As long as you wash your hands before breastfeeding, you can continue to breastfeed through most illnesses that you or your baby have. â¢ A rear-facing car safety seat should always be used to transport your baby in all vehicles, including taxis and cars owned by friends or other family members, until your baby is 5-musp-qu-age AND weighs at least 20 pounds. â¢ The back seat is ALWAYS the safest place for children to ride. Never put your babyâs car safety seat in the front seat of a vehicle with a passenger air bag. Air bags deploy with great force against a car safety seat and cause serious injury or death. â¢ Your baby needs to remain in his car safety seat at all times during travel. If he becomes fussy or needs to nurse, stop the vehicle and remove him from the car safety seat to attend to his needs. â¢ It is very important for your babyâs health that your home, vehicle, and other places the baby stays are smoke-free. â¢ Always keep one hand on your baby when changing diapers or clothing on a changing table, couch, or bed to prevent her from falling. Parent health    â¢ Having a new baby at home can certainly be extremely stressful. Itâs important to have people you can turn to when you need help with the baby.  Consider talking with family members or friends and making arrangements with them so that they can be prepared to help if needed. â¢ All parents get upset sometimes. When you have these feelings, put the baby down in a safe place, like a crib or cradle. It helps if you have somebody to call or ask for help when you feel upset. â¢ Never yell at, hit, or shake your baby. â¢ Youâll probably want to spend most of your time and attention on the new baby, but donât forget to take time for yourself alone and for you and your partner. Nurturing yourself will help you stay healthy and happy for your baby. â¢ Many mothers feel tired or overwhelmed in the first weeks at home. They also may experience some âbaby bluesâ for a short time. These feelings should not continue, however. If you find that you are continuing to feel very tired, overwhelmed, or blue, you need to let your partner, your health care professional, and/or me know so that we can get the resources to help you. Siblings      â¢ To help your older children adjust to the new baby and still feel wanted and loved, ask for their help in caring for the baby. Make sure not to ask them to do anything beyond their capability. â¢ Do not leave the baby unsupervised with young or inexperienced brothers or sisters. â¢ Spend individual time every day with your other children doing things they like to do. Financial concerns    [employer] agencies are available to assist you with concerns about your living situation. Public health agencies are often the best place to start because they work with all types of community agencies and family needs. You may consider contacting them for help. â¢ Social, saige-based, cultural, volunteer, and recreational organizations or programs are available in the community to help support new families.   [facility]  Other things to know    â¢ There is the possibility of some female vaginal bleeding in female infants as a result of maternal hormones. â¢ A  babyâs skin is sensitive. Using fragrance-free soaps and lotions for bathing and fragrance-free detergents for washing clothing will reduce the likelihood of rashes. In addition, oils and heavy lotions tend to clog pores and increase the likelihood of rashes. â¢ For areas of dry skin, such as creases and feet, you can try a baby oil or lotion. Powders are not recommended because of the possibility of inhalation and possible respiratory problems. â¢ Also, because your babyâs skin is sensitive, do not expose her to direct sunlight. Sunscreens are not recommended until 10months of age. As much as possible, keep your baby out of the sun. If she has to be in the sun, use a sunscreen made for children. For babies younger than 6 months, sunscreen may be used on small areas of the body, such as the face and backs of the hands, if adequate clothing and shade are not available. â¢ Your babyâs skin may not need to be washed with soap daily. The face and hands should be washed with a warm, wet washcloth everyday. â¢ To prevent diaper rash, clean your baby after wet diapers or stools and change her diaper frequently. For some babies, diaper creams or ointments may be helpful, but good cleaning and air-drying before replacing the diaper are best.    â¢ Current cord care recommendations include âair drying,â by keeping the diaper below the cord until the cord falls off (about 10 to 14 days). There may be some slight bleeding for a day or 2 after the cord falls off. Belly bands and alcohol on the cord are not recommended. Call our office if there is a bad smell, redness, or fluid from the cord area. T.I.P.S. To Prevent Flat Heads In Infants    Tummy Time    Â· Aim for 10 to 15 minutes, at least three times each day. Most babies won't actually tolerate this length of time on their tummy at first, but do the best you can. Start with short amounts of time several times a day.  This will make a difference. Â· Increase the amount of time your baby spends on their tummy while awake. Â· Make sure your baby is awake and supervised. Â· Positioning Ideas:   - Place your baby tummy-side down on your chest   - Place your baby over your lap to burp or to rest   - Give your baby tummy time right after each diaper change   - Lie on the floor on your stomach and face your baby   - Try a towel roll under your babyâs arms to provide support   - Use mirror, lighted or musical toys that are placed within your babyâs reach   - Carry your baby around tummy side down (âairplaneâ)      Infant Equipment    Â· Limit the amount of time your baby spends in equipment like swings, strollers, bouncy seats and car seats. These all put pressure on the back or side of the head and can cause skull flattening. Â· Give your baby more chances to play on the floor on their back and stomach. Â· Use an upright baby carrier instead (SnugliÂ®, Baby BjornÂ®, etc): - Takes the pressure off the back of the head   - Baby can look around   - Baby feels close and comfortable next to parent or caregiver    Positioning  Â· Turn your babyâs head to the opposite side each time they sleep  Â· Alternate which end of the crib your babyâs head is placed  Â· Give your baby time on each side while awake  Â· Alternate which arm your baby is held in during feeding  Â· Move the crib around the room so baby looks around the whole room. Â· Put toys on each side of baby in all positions when playing    Start Day One    Â· Start on the first day of life. Vitamin D Supplements        If your baby is being , OR if your baby gets less than 32 oz of formula a day, it is recommended that he or she receive an extra Vitamin D supplement. Why? Vitamin D is needed to help your baby develop strong bones. A deficiency in Vitamin D can cause a disease called rickets, which can lead to fragile bones and bones that do not grow correctly.    In addition, we are learning a lot more about the benefits of Vitamin D, and it looks like most people probably do not get enough of this vitamin. We get Vitamin D in two ways - in what we eat and through sunlight. However, human milk does not contain enough vitamin D to prevent rickets. Sunlight is the usual source of vitamin D production in the skin, but babies shouldnât spend much time in the sun due to their sensitive skin. (Not to mention, we live in [place] where sunlight is in low supply most of the year.)    Thus, we recommend starting your baby on a Vitamin D supplement as soon as possible. I would recommend introducing them as soon as you and the baby are comfortable with the breastfeeding process. Vitamin D supplements for your baby should also be easy to find in our local store or pharmacy. The most common formulations you will see are the Enfamil Tri-Vi-Sol  Drops which contains vitamins A, D & C and Enfamil Poly-Vi-Sol Drops which contains multiple vitamins. You can give this directly by mouth, but they can be a little thick, so if your baby doesnât like it, try mixing it with an ounce or two of breast milk or formula. However, these are by no means the only available products. They are just the ones you will likely come across pretty much wherever you look. There are numerous generics out there, such as [facility] brand. It doesnât really matter how many other vitamins are in the drops. The most important thing is that the product contains 400 International Units of Vitamin D.  Below is an example of a package nutrition panel. In addition, Vitamin D is recommended for moms as well. Many experts recommend 1000 IU to 2000 IU a day for women. If you are still taking a prenatal vitamin, look at the label. It contains only 400 IU. Consider buying an additional Vitamin D supplement for yourself!     Supplement Facts    For Infants For Children Under Age 4 Years   Serving Size: 1 mL Dropperful 1 mL Dropperful      Amount per Serving % Daily Value for Infants % Daily Value for Children Under Age 4 Years         Vitamin A   1500 % 60%      Vitamin C   35 mg 100% 88%      Vitamin D   400 % 100%      Vitamin E   5 % 50%      Thiamin (B1)   0.5 mg 100% 71%      Riboflavin (B2)   0.6 mg 100% 75%      Niacin (B3)   8 mg 100% 89%      Vitamin B6   0.4 mg 100% 57%      Vitamin B12   2 mcg 100% 67%          ere are some commonly asked questions and answers on the recommendations in the AAP Clinical Report, âPrevention of Rickets and Vitamin D Deficiency in Infants, Children and Adolescents. â     Q. Why do babies and older children need vitamin D? A. Vitamin D is needed to help your body develop strong bones. Q: Why is it necessary to give vitamin D supplements to my breastfeeding baby? Doesnât human milk have all the essential nutrients for babies? A. The AAP recommends breastfeeding of infants for at least 12 months and for as long thereafter as the mother and infant desire. However, human milk does not contain enough vitamin D to prevent rickets. Sunlight is the usual source of vitamin D production in the skin. Q: If my baby goes out in the sunlight every day or every other day for an hour or so, isnât that enough? A: It may be enough sunlight exposure in some parts of the country throughout the year, but sunlight exposure is difficult to measure. Factors such as the amount of pigment in your babyâs skin and skin exposure affect how much vitamin D is produced by your body from sunlight. In the more northern parts of the country during the winter, the amount of sunlight is not enough for any baby. Exposing infants and childrenâs skin to sunlight has been shown to increase the risk for skin cancer later in life. We now recommend that infants and young children not be in direct sunlight when they are outside, particularly infants younger than 10months of age.  Sunscreens should be used on all children when in sunlight but it prevents vitamin D formation in the skin. Q: Do I need to give vitamin D supplements to infants who are fed infant formula? A. No. All formulas sold in the [facility] have a sufficient amount of vitamin D that infants need as long as they are given 27 to 32 ounces of infant formula per day. Q: When I take my baby outside, I always cover all of his skin with sunscreen, just as the AAP recommends. Isnât that enough to protect him from cancer and yet give him vitamin D? A: No, the sunscreen prevents the skin from making vitamin D.    Q: How do I give vitamin D to my infant? A: Liquid multivitamin drops with vitamin D are available. If you give your baby the recommended amount from the dropper in the vitamin drops bottle, the baby will get all the vitamin D needed to prevent rickets. Q: How often do I give the vitamin D drops? A: You should give the drops once a day, every day. But, if you forget one day, it is all right. The vitamin D is stored in the baby and there will be enough in the babyâs body to prevent rickets. Q: If I give the vitamin drops to the baby, will the baby not want to breastfeed? A: No, the drops will not interfere with breastfeeding. The amount is very small and giving a few drops from the dropper in to the corner of the babyâs mouth toward the cheek will not give the baby any problem with breastfeeding. Q: Donât the vitamin drops taste bad?  A: Some vitamin drops do have a strong taste, especially the ones that contain vitamin B, but the triple vitamin drops that contain only vitamins A, C, and D taste all right. Q: Do babies need vitamins A and C also? A:  babies do not need vitamins A and C, but giving them these additional vitamins in this dosage is not harmful. Q: How long do I keep giving the vitamin D drops?   A: You should keep giving it until your child has weaned from breastfeeding and is receiving 27 to 32 ounces of infant formula every day. The AAP recommends that âinfants weaned before 15months of age should not receive cowâs milk feedings but should receive iron-fortified infant formulaâ. Once your child begins drinking at least a quart of vitamin D-fortified milk after 15months of age, he/she does not need additional vitamin D drops or a tablet. If, however, your child drinks less than one quart of regular milk, you should give him/her vitamin D drops or a tablet. This recommendation applies to children of all ages and even to adults. Q: How will I know if my baby or child has rickets from vitamin D deficiency? A: Rickets is a disease of the bones and is difficult to diagnose clinically without an X-ray before your infant begins to walk, at which time there will be excessive bowing of the legs. There may also be swelling of the wrists and ankles. Many infants with vitamin D deficiency will have slow growth. Some may have breathing and heart problems.

## 2024-02-05 NOTE — PLAN OF CARE
"/70 (BP Location: Left arm, Patient Position: Semi-Donato's, Cuff Size: Adult Regular)   Pulse 81   Temp 97.8  F (36.6  C) (Oral)   Resp 20   Ht 1.74 m (5' 8.5\")   Wt 68.8 kg (151 lb 10.8 oz)   SpO2 96%   BMI 22.72 kg/m      Pt A&Ox4. Denies pain. Lung sounds clear and dim. Denies SOB. On Protonix. No bloody stools this shift. Hgb 8.5. R wrist abrasion - wound cleansed and mepilex in place. Gordon removed for voiding trial prior to discharge home this afternoon.   "

## 2024-02-05 NOTE — CONSULTS
Care Management Discharge Note    Discharge Date: 02/05/2024       Discharge Disposition: home with spouse     Discharge Services:  HC PT    Discharge DME:  none    Discharge Transportation: family or friend will provide    Education Provided on the Discharge Plan: yes   Persons Notified of Discharge Plans: patient  Patient/Family in Agreement with the Plan:  yes    Handoff Referral Completed: No        Additional Information:  CM consulted to assist patient for HC Physical Therapy on discharge. Met with patient and family member in room to discuss HC recommendation. He was alert and oriented, dressed and ready for discharge. Discussed HC visits at home and patient immediately declined. He is not interested in any in home services. Consult cleared and MD updated to removes HC orders. Family in room will transport patient home. No needs identified.             Tania Farah RN BSN CM  Inpatient Care Coordination  [facility]  [phone]

## 2024-02-05 NOTE — DISCHARGE SUMMARY
[facility]  Hospitalist Discharge Summary      Date of Admission:  2/3/2024  Date of Discharge:  2/5/2024  Discharging Provider: Martín Tate MD  Discharge Service: Hospitalist Service    Discharge Diagnoses   Acute GI Bleed  Acute Blood Loss Anemia  Duodenal ulcer.  Acute gastritis.  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  The following labs/tests   are recommended: Hgb  DO NOT TAKE NSAID medication, Only Tylenol for pain or fever  .DO NOT RESUME YOUR ASPIRIN UNTIL FEB 12              Unresulted Labs Ordered in the Past 30 Days of this Admission       Date and Time Order Name Status Description    2/3/2024  2:04 PM Prepare red blood cells (unit) Preliminary         These results will be followed up by HGB    Discharge Disposition   Discharged to Home with [facility]  Condition at discharge: Stable    Hospital Course   Zack Booth is a 83 year old male a history of HTN, Prediabetes, PAD, HLD, CAD, OA, Atrial Fibrillation not on anticoagulation, Metastatic Adenocarcinoma of the Lung currently on Ketruda who presents to the ED today with black colored stools.     Acute GI Bleed  Acute Blood Loss Anemia  Pt presents with acute onset of black tarry stools this morning.  Denies any abdominal pain.  Currently on Keytruda for treatment of lung cancer and is on ASA daily for hx of CAD.  Hemodynamically stable.  Hgb on arrival 9.2 down to 7.5 on repeat two hours later.  CTA abd/pelvis with no definite site of active GI bleeding identified, but revealed mild thickening and enhancement of the first and second portions of the duodenum.  - IVF, antiemetics, ADAT,  analgesics prn  -Transfused 2 units prbc on admission  -Continue serial hgb levels  -Oral Protonix, discontinue IV Protonix  - GI consulted. EGD shows duodenal PUD with signs of possible recent bleeding but not active at the moment of the scope.  Gastritis also seen.         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  Diagnosed 12/2023.  Currently on Keytruda with last infusion 2/1/24.  I asked pharmacy to review the literature, there is report of bleeding in endometrial cancer but not gastrointestinal bleeding.    Chronic COPD  Tobacco Abuse  No signs of acute exacerbation     CAD  HTN  Hx of PCI ~2000.  In 2013, he underwent cardiac catheterization and had a stent placed in his circumflex coronary. He did have a lesion of his LAD which was 60 to 70% and total occlusion of his right coronary artery.  Pt denies any chest pain.  Troponin flat 42/35   -We will progressively resume amlodipine and Spironolactone.  Continue holding aspirin in the setting of GIB     Hx SSS and Atrial Fibrillation  Per chart review, diagnosed in 2013.  PPM was recommended of which patient declined.  On ASA only.     CKD stage II  Creatinine on admission 1.67 at baseline (1.4-1.8)  - IVF hydration     CODE: DNR/DNI confirmed with patient and his 2 daughters at the bedside  Diet/IVF: npo, NS  GI ppx:  protonix  DVT ppx: SCD     Consultations This Hospital Stay   GASTROENTEROLOGY IP CONSULT  PHYSICAL THERAPY ADULT IP CONSULT    Code Status   No CPR- Do NOT Intubate    Time Spent on this Encounter   I, Martín Tate MD, personally saw the patient today and spent greater than 30 minutes discharging this patient.       Martín Tate MD  [place] [address] [facility]  [address] [place] [address]  Phone: [phone]  Fax: [phone]  ______________________________________________________________________    Physical Exam   Vital Signs: Temp:  97.6  F (36.4  C) Temp src: Oral BP: 123/65 Pulse: 77   Resp: 18 SpO2: 94 % O2 Device: None (Room air)    Weight: 151 lbs 10.82 oz  Constitutional: awake, alert, cooperative, no apparent distress, and appears stated age       Primary Care Physician   Simón Silveira    Discharge Orders      Home Care Referral      Reason for your hospital stay    Acute blood loss anemia from Duodenal ulcer with bleeding     Follow-up and recommended labs and tests     Follow up with primary care provider, Simón Silveira, within 7 days for hospital follow- up and regarding new diagnosis.  The following labs/tests are recommended: Hgb  DO NOT TAKE NSAID medication, Only Tylenol for pain or fever  .DO NOT RESUME YOUR ASPIRIN UNTIL FEB 12     Activity    Your activity upon discharge: activity as tolerated     Diet    Follow this diet upon discharge: Orders Placed This Encounter      Snacks/Supplements Adult: Ensure Enlive; Between Meals      Soft & Bite Sized Diet (level 6) Thin Liquids (level 0)       Significant Results and Procedures   Results for orders placed or performed during the hospital encounter of 02/03/24   CTA Abdomen Pelvis with Contrast    Narrative    EXAM: CTA ABDOMEN AND PELVIS WITH CONTRAST  LOCATION: [facility]  DATE: 02/03/2024    INDICATION: GI bleeding.  COMPARISON: None.  TECHNIQUE: CT angiogram abdomen pelvis during arterial phase of injection of IV contrast. 2D and 3D MIP reconstructions were performed by the CT technologist. Dose reduction techniques were used.  CONTRAST: 93 mL Isovue 370.    FINDINGS:  ANGIOGRAM ABDOMEN/PELVIS: Severe atherosclerotic aortoiliac calcification. The renal arteries, celiac artery, SMA, and LATHA are patent. Infrarenal abdominal aortic aneurysm measures 3.1 cm AP. Bilateral common iliac artery stents are patent.    LOWER CHEST: Severe coronary artery calcification. Ectasia of the ascending thoracic aorta is partially included on this exam, and measures up to 4.3  cm where visualized. Moderate-sized hiatal hernia.    HEPATOBILIARY: No significant mass or bile duct dilatation. No calcified gallstones.     PANCREAS: Normal.    SPLEEN: Normal.    ADRENAL GLANDS: Normal.    KIDNEYS/BLADDER: There are multiple bilateral renal cysts, for which no specific follow-up would be recommended. The largest of these cysts is in the interpolar region of the left kidney measuring 5.8 cm. No hydronephrosis.    BOWEL: There is mild thickening and enhancement of the first and second portions of the duodenum. Colonic diverticulosis. No evidence for colitis or diverticulitis. No bowel obstruction. Appendectomy. No definitive site for active GI bleeding is   identified.    LYMPH NODES: No lymphadenopathy.    PELVIC ORGANS: The prostate gland is prominently enlarged, measuring 6 cm transverse.    MUSCULOSKELETAL: Degenerative changes in the visualized thoracolumbar spine. Large area of ill-defined lytic and sclerotic change and cortical destruction involving the left iliac bone is suspicious for primary or metastatic malignancy. There are chronic   appearing fractures of multiple right lower ribs.      Impression    IMPRESSION:  1.  Mild thickening and enhancement of the first and second portions of the duodenum. Differential considerations include duodenitis and duodenal ulcer disease. Consider endoscopy for further evaluation.  2.  Colonic diverticulosis, without evidence for diverticulitis.  3.  No definite site of active GI bleeding is identified.  4.  Prostatic enlargement.  5.  Large area of ill-defined lytic and sclerotic change involving the left iliac bone, suspicious for primary or metastatic malignancy.         Discharge Medications   Current Discharge Medication List        START taking these medications    Details   pantoprazole (PROTONIX) 40 MG EC tablet Take 1 tablet (40 mg) by mouth 2 times daily for 90 days  Qty: 180 tablet, Refills: 0    Associated Diagnoses: Acute GI bleeding;  Gastrointestinal hemorrhage associated with duodenal ulcer           CONTINUE these medications which have CHANGED    Details   aspirin (ASA) 325 MG EC tablet Take 1 tablet (325 mg) by mouth daily    Associated Diagnoses: Cardiomegaly           CONTINUE these medications which have NOT CHANGED    Details   acetaminophen (TYLENOL) 500 MG tablet Take 1,000 mg by mouth 3 times daily      amLODIPine (NORVASC) 10 MG tablet Take 10 mg by mouth daily      cyanocobalamin (VITAMIN B-12) 1000 MCG tablet Take 1,000 mcg by mouth daily      HYDROmorphone (DILAUDID) 2 MG tablet Take 1-2 mg by mouth every 4 hours as needed for severe pain      Lidocaine (LIDOCARE) 4 % Patch Place 1 patch onto the skin every 24 hours To prevent lidocaine toxicity, patient should be patch free for 12 hrs daily.      nitroGLYcerin (NITROSTAT) 0.4 MG sublingual tablet Place 0.4 mg under the tongue every 5 minutes as needed for chest pain For chest pain place 1 tablet under the tongue every 5 minutes for 3 doses. If symptoms persist 5 minutes after 1st dose call 911.      senna-docusate (SENOKOT-S/PERICOLACE) 8.6-50 MG tablet Take 1 tablet by mouth 2 times daily      spironolactone (ALDACTONE) 100 MG tablet Take 100 mg by mouth daily           Allergies   Allergies   Allergen Reactions    Atenolol      Other reaction(s): Bradycardia    Lisinopril      Other reaction(s): *Unknown    Statins Muscle Pain (Myalgia)     Bad dreams    Ibuprofen      Other reaction(s): Headache

## 2024-02-05 NOTE — DISCHARGE SUMMARY
AVS reviewed with pt dgtr per pt request. All questions answered. Pt and family deny any further questions or concerns. PIV removed, no complications. All belongings returned. Pt escorted to front door via WC by [facility] staff, home via dgtr.

## 2024-02-05 NOTE — PLAN OF CARE
VSS on RA. A&O, but forgetful at times. Denies pain, cp, sob, n/v. Kee for retention, clear yellow urine. Hgb increases. No BM this shift.    Plan: reevaluate need for kee, discharge TBD.     Goal Outcome Evaluation:      Plan of Care Reviewed With: patient    Overall Patient Progress: no changeOverall Patient Progress: no change

## 2024-02-05 NOTE — PLAN OF CARE
Shift summary (1100-time of discharge)    Pt Ox4. VSS on RA. Denies pain, CP, SOB. RPIV intact, SL. Up Ax1 GB W. Pt has voided since kee removal this AM. Adequate for discharge today, home via family. Will continue POC.     Goal Outcome Evaluation:      Plan of Care Reviewed With: patient    Overall Patient Progress: improvingOverall Patient Progress: improving

## 2024-02-05 NOTE — PLAN OF CARE
"Care from 1500 - 1900:    Goal Outcome Evaluation:      Plan of Care Reviewed With: patient    Overall Patient Progress: no change     Temp: 97.4  F (36.3  C) Temp src: Oral BP: 133/75 Pulse: 92   Resp: 20 SpO2: 99 % O2 Device: None (Room air)       A/O x4, forgetful at times, pleasant and cooperative. VSS, afebrile. Denies pain, c/p, n/v, SOB. LS dim and clear, on RA. PIVs are SL. Assist of 1 gb wk. Gordon in place for retention, adequate UOP. Up in chair for dinner, fair appetite. PCD on @ hs. Plan to discharge home in 1-2 days. Will continue POC.     Problem: Adult Inpatient Plan of Care  Goal: Plan of Care Review  Description: The Plan of Care Review/Shift note should be completed every shift.  The Outcome Evaluation is a brief statement about your assessment that the patient is improving, declining, or no change.  This information will be displayed automatically on your shift  note.  Outcome: Progressing  Flowsheets (Taken 2/4/2024 1938)  Plan of Care Reviewed With: patient  Overall Patient Progress: no change  Goal: Patient-Specific Goal (Individualized)  Description: You can add care plan individualizations to a care plan. Examples of Individualization might be:  \"Parent requests to be called daily at 9am for status\", \"I have a hard time hearing out of my right ear\", or \"Do not touch me to wake me up as it startles  me\".  Outcome: Progressing  Goal: Absence of Hospital-Acquired Illness or Injury  Outcome: Progressing  Intervention: Prevent and Manage VTE (Venous Thromboembolism) Risk  Recent Flowsheet Documentation  Taken 2/4/2024 1815 by Anat Hernandez, RN  VTE Prevention/Management: SCDs (sequential compression devices) on  Goal: Optimal Comfort and Wellbeing  Outcome: Progressing  Goal: Readiness for Transition of Care  Outcome: Progressing     Problem: Gastrointestinal Bleeding  Goal: Optimal Coping with Acute Illness  Outcome: Progressing  Goal: Hemostasis  Outcome: Progressing     "

## 2024-02-06 NOTE — PLAN OF CARE
Physical Therapy Discharge Summary    Reason for therapy discharge:    Discharged to home with home therapy.    Progress towards therapy goal(s). See goals on Care Plan in [facility] electronic health record for goal details.  Goals not met.  Barriers to achieving goals:   discharge from facility.    Therapy recommendation(s):    Continued therapy is recommended.  Rationale/Recommendations:  Rec home with assist and HHPT to progress strength and IND mobility.

## 2024-02-09 PROBLEM — D62 ANEMIA DUE TO BLOOD LOSS, ACUTE: Status: ACTIVE | Noted: 2024-01-01

## 2024-02-09 PROBLEM — K92.1 MELENA: Status: ACTIVE | Noted: 2024-01-01

## 2024-02-09 NOTE — ED PROVIDER NOTES
History     Chief Complaint:  Rectal Bleeding    The history is provided by the patient.      Zack Booth is a 83 year old male with a history of gastritis, duodenal ulcers who presents via EMS to the emergency department for rectal bleeding.  Patient states that he has had black, runny stools since yesterday.  Also endorses shortness of breath, lightheadedness, dizziness, fatigue.  Denies fever, vomiting, abdominal pain, urinary symptoms.  Denies cough.  No sick exposures.    Independent Historian:   None - Patient Only    Review of External Notes:   Reviewed recent admission note from 2/3/2024.  Acute GI bleed.  EGD showed duodenal ulcers, gastritis, evidence of previous bleed but no active bleeding at the time.  Required 2 units of blood on admission and IV Protonix.  Discharged in stable condition with oral Protonix.    Medications:    amLODIPine (NORVASC) 10 MG tablet  HYDROmorphone (DILAUDID) 2 MG tablet  nitroGLYcerin (NITROSTAT) 0.4 MG sublingual tablet  pantoprazole (PROTONIX) 40 MG EC tablet  senna-docusate (SENOKOT-S/PERICOLACE) 8.6-50 MG tablet  spironolactone (ALDACTONE) 100 MG tablet    Past Medical History:    Stage 4 metastatic adenocarcinoma of the lung  CKD stage 3  Coronary atherosclerosis  Hypertension  Hyperlipidemia  AFIB  Cardiomegaly  S/P coronary stent placement  Tobacco minor  GI bleeding  Anemia  COPD  Duodenal ulcer    Past Surgical History:    Appendectomy  TKA x 7  Angioplasty  Vasectomy     Physical Exam   Patient Vitals for the past 24 hrs:   BP Temp Temp src Pulse Resp SpO2 Height Weight   02/09/24 1401 115/61 97.4  F (36.3  C) Oral 90 20 96 % -- --   02/09/24 1139 118/71 -- -- 96 -- 97 % -- --   02/09/24 1137 -- 97.7  F (36.5  C) Temporal -- 18 -- 1.829 m (6') 65.6 kg (144 lb 10 oz)      Physical Exam  Physical Exam:  General: alert  CV: irregular no murmur/gallop/rubs  Pulm: lungs clear to ausculation bilaterally, normal respiratory effort, normal chest expansion with breathing    Abdomen: soft, non-tender, non-distended, no rigidity or guarding, normal BS  : melena noted on MEENA. No signs of BRB, hemorrhoids, or external abnormalities.   Skin: Warm, dry, no rashes  Psych: Appropriate mood. Cooperative      Emergency Department Course   ECG  ECG results from 02/09/24   EKG 12-lead, tracing only     Value    Systolic Blood Pressure     Diastolic Blood Pressure     Ventricular Rate 88    Atrial Rate     VA Interval     QRS Duration 90        QTc 445    P Axis     R AXIS 26    T Axis 105    Interpretation ECG      Atrial fibrillation with premature ventricular or aberrantly conducted complexes  Nonspecific ST and T wave abnormality  When compared with ECG of 03-FEB-2024 11:46, no significant changes       Imaging:  XR Chest 2 Views   Final Result   IMPRESSION: No infiltrate, pleural effusion or pneumothorax. Small   lucency along the lateral right lung base is likely due to   superimposed soft tissue shadows. The cardiac and mediastinal   silhouettes are normal. Again seen are mildly displaced healing   fractures of the right lateral ninth and 10th. Callus formation   present around these fractures seen on the recent CT on 2/3/2024,   consistent with healing rib fractures      IRINA LAND MD            SYSTEM ID:  [id number]         Read by radiologist.     Laboratory:  Labs Ordered and Resulted from Time of ED Arrival to Time of ED Departure   COMPREHENSIVE METABOLIC PANEL - Abnormal       Result Value    Sodium 142      Potassium 5.2      Carbon Dioxide (CO2) 19 (*)     Anion Gap 12      Urea Nitrogen 43.4 (*)     Creatinine 1.66 (*)     GFR Estimate 40 (*)     Calcium 8.8      Chloride 111 (*)     Glucose 122 (*)     Alkaline Phosphatase 76      AST 10      ALT 6      Protein Total 5.5 (*)     Albumin 3.1 (*)     Bilirubin Total 0.3     TROPONIN T, HIGH SENSITIVITY - Abnormal    Troponin T, High Sensitivity 65 (*)    CBC WITH PLATELETS AND DIFFERENTIAL - Abnormal    WBC Count 15.6  (*)     RBC Count 2.48 (*)     Hemoglobin 7.7 (*)     Hematocrit 24.3 (*)     MCV 98      MCH 31.0      MCHC 31.7      RDW 14.9      Platelet Count 206      % Neutrophils 89      % Lymphocytes 4      % Monocytes 5      % Eosinophils 0      % Basophils 0      % Immature Granulocytes 2      NRBCs per 100 WBC 0      Absolute Neutrophils 13.7 (*)     Absolute Lymphocytes 0.7 (*)     Absolute Monocytes 0.8      Absolute Eosinophils 0.0      Absolute Basophils 0.0      Absolute Immature Granulocytes 0.3      Absolute NRBCs 0.0     NT PROBNP INPATIENT - Normal    N terminal Pro BNP Inpatient 1,671     LACTIC ACID WHOLE BLOOD - Normal    Lactic Acid 1.0     TROPONIN T, HIGH SENSITIVITY   TYPE AND SCREEN, ADULT    ABO/RH(D) O POS      Antibody Screen Negative      SPECIMEN EXPIRATION DATE [id number]     ABO/RH TYPE AND SCREEN      Emergency Department Course & Assessments:     Interventions:  Medications   sodium chloride 0.9% BOLUS 500 mL (500 mLs Intravenous $New Bag 2/9/24 1401)   pantoprazole (PROTONIX) IV push injection 40 mg (40 mg Intravenous $Given 2/9/24 1356)      Assessments/ Independent Interpretation (X-rays, CTs, rhythm strip)/Consultations/Discussion of Management or Tests:  ED Course as of 02/09/24 1501   Fri Feb 09, 2024   1214 Evaluated the patient.   1248 Gave patient an update about likely admission due low Hgb.   1312 D/W Olga Newby who is accepting admission for Dr. Perales.      Social Determinants of Health affecting care:   None    Disposition:  The patient was admitted to the hospital under the care of Dr. Perales.     Impression & Plan        Medical Decision Making:  Patient presents as stated above.  History of recent admission for upper GI bleed.  EGD showed duodenal ulcers, gastritis, hiatal hernia.  Patient prescribed oral Protonix at discharge however does not know if he has been taking it. Rectal exam done today showed melena.  Labs notable for anemia with hemoglobin of 7.7,   leukocytosis with a white blood cell count of 15.6.  Negative COVID panel. Chest x-ray negative for infiltrate, pleural effusion.  Patient does not seem volume overloaded on exam.  BNP pending.  Patient also has a history of a-fib but is not on anticoagulation. He does take aspirin for CAD. EKG shows afib. Troponin elevated but elevated at baseline likely due to chronic A-fib. Repeat trop pending. Vitals stable. Patient will need to be admitted for low hemoglobin, leukocytosis and evidence of melena.  Case discussed with Olga Huggins who is excepting for Oleg Perales MD.  IV fluids and IV Protonix started.    Diagnosis:    ICD-10-CM    1. Melena  K92.1       2. Anemia due to blood loss, acute  D62       3. Fatigue  R53.83       4. Leukocytosis  D72.829                     Titus Randolph PA-C  02/09/24 1515

## 2024-02-09 NOTE — CONSULTS
[facility] Dig[facility]ive [facility] - Gastroenterology Consultation    Consultation Assessment/Plan:    1.) Duodenal Ulcers:  - recent diagnosis of NSAID induced duodenal ulcers, non-bleeding (2/3/24)  - now recurrent dark stools, hgb roughly stable after last hospitalization  - unclear picture at this point, plan to monitor stools, follow hgb  - continue PPI, keep NPO at MN, reevaluate whether there is a need for repeat endoscopy tomorrow        Johnson Chau MD    Office: [phone]    ---------------------------------------------------------------------------------------------------------------------------  Patient Name: Zack Booth      YOB: 1940 (Age: 83 year old)   Medical Record #: [id number]       Primary Physician: Simón Silveira   Referring Provider: Oleg Perales MD   Admit Date/Time: 2/9/2024 11:34 AM       I was asked to see this patient by Oleg Perales MD for evaluation of dark stool.    History of Present Illness:  84 yo male with hx metastatic lung CA (recently started Keytruda, last infusion 2/1/24), tobacco use, COPD, CAD/stenting, duodenal ulcers (EGD: 2/3/24, non-bleeding, presumed NSAID induced), A.fib (no anticoag, previously on ASA) and HTN who presents with c/o recurrent dark stools.      He was recently hospitalized for melena (2/3/24-2/5/24) while taking Ibuprofen and ASA.  CT angio noted duodenitis but no active bleeding.  EGD subsequently found 2 non-bleeding duodenal ulcers.  He was discharged on Protonix 40mg PO BID.    Now reports recurrence of dark diarrheal stool which began today.  He continues to take Protonix BID and is avoiding NSAIDs.  Vital signs stable.  Hgb=7.7 which is fairl stable when compared to recent hospitalization when he received 1 unit PRBCs (on 2/3/24).    EGD (2/3/24):  -indication: melena  1.) Normal esophagus  2.) Antral gastritis.  3.) 4cm hiatal hernia  4.) Two non-bleeding duodenal ulcers (largest 10mm) treated with cautery,  hemospray    CT Abd (2/3/24):  1.  Mild thickening and enhancement of the first and second portions of the duodenum. Differential considerations include duodenitis and duodenal ulcer disease. Consider endoscopy for further evaluation.  2.  Colonic diverticulosis, without evidence for diverticulitis.  3.  No definite site of active GI bleeding is identified.  4.  Prostatic enlargement.  5.  Large area of ill-defined lytic and sclerotic change involving the left iliac bone,  suspicious for primary or metastatic malignancy.    Past Medical History:  Past Medical History:   Diagnosis Date    Cancer (H)     Chronic kidney disease, stage III (moderate) (H)     3/4/2015    Congenital renal agenesis and dysgenesis     3/4/2015,Hospitalized following over-medication with Atenolol.    Coronary atherosclerosis     3/4/2015    Disturbance of skin sensation     3/4/2015    Essential hypertension     3/4/2015    Other and unspecified hyperlipidemia     3/4/2015    Other symptoms involving nervous and musculoskeletal systems     3/4/2015    Postsurgical percutaneous transluminal coronary angioplasty status     3/4/2015     Past Surgical History:   Procedure Laterality Date    APPENDECTOMY OPEN      No Comments Provided    ARTHROPLASTY KNEE      x7 knee surgeries.    ESOPHAGOSCOPY, GASTROSCOPY, DUODENOSCOPY (EGD), COMBINED N/A 2/3/2024    Procedure: Esophagoscopy, gastroscopy, duodenoscopy, combined;  Surgeon: Kavin Duong MD;  Location: [facility] OR    HEART CATH, ANGIOPLASTY      No Comments Provided    OTHER SURGICAL HISTORY      [id number],OTHER    VASECTOMY      No Comments Provided       Review of Systems: A comprehensive review of systems was negative except for items noted in HPI/Subjective.    Current Medications:  No current outpatient medications on file.       Allergies/Sensitivities:   Allergies   Allergen Reactions    Atenolol      Other reaction(s): Bradycardia    Lisinopril      Other reaction(s): *Unknown    Statins Muscle  Pain (Myalgia)     Bad dreams    Ibuprofen      Other reaction(s): Headache          Social History:   Social History     Socioeconomic History    Marital status:      Spouse name: Cami    Number of children: Not on file    Years of education: Not on file    Highest education level: Not on file   Occupational History    Not on file   Tobacco Use    Smoking status: Every Day     Packs/day: 0.25     Years: 70.00     Additional pack years: 0.00     Total pack years: 17.50     Types: Cigarettes    Smokeless tobacco: Never    Tobacco comments:     Quit smoking: -- started at about age 5 years old   Vaping Use    Vaping Use: Never used   Substance and Sexual Activity    Alcohol use: No     Comment: Alcoholic Drinks/day: maybe 1 glass of wine at holiday.    Drug use: Unknown     Types: Other     Comment: Drug use: No    Sexual activity: Not on file   Other Topics Concern    Not on file   Social History Narrative     - wife Cami -  55 years as of     4 children. 2nd son  in .     Social Determinants of Health     Financial Resource Strain: Not on file   Food Insecurity: Not on file   Transportation Needs: Not on file   Physical Activity: Not on file   Stress: Not on file   Social Connections: Not on file   Interpersonal Safety: Not on file   Housing Stability: Not on file     Family History:   Family History   Problem Relation Age of Onset    Genetic Disorder Other         Genetic,No FHx of DM       Physical Exam:  /64 (BP Location: Right arm)   Pulse 82   Temp 97.4  F (36.3  C) (Temporal)   Resp 20   Ht 1.829 m (6')   Wt 65.6 kg (144 lb 10 oz)   SpO2 99%   BMI 19.61 kg/m     General Appearance: Comfortable, laying in bed  Eyes: Normal  HEENT: Normal  Neck: Normal, no palpable lymph nodes  Respiratory: Normal  Cardiovascular: Normal  Gastrointestinal: Normal bowel sounds  Musculoskeletal: Normal  Lymphatic: Normal  Skin: Normal  Neurologic: Normal     Labs/Imaging:  Recent  "Labs   Lab Test 02/09/24 1219 02/05/24  0644 02/05/24  0003 02/04/24  1809 02/04/24  1151 02/04/24  0558 02/04/24  0020 02/03/24  1104 02/03/24  0911   WBC 15.6*  --   --   --   --  12.2*  --   --  15.1*   HGB 7.7* 8.5* 9.1* 8.9* 8.6* 8.0*  8.0* 8.8*   < > 9.2*   MCV 98  --   --   --   --  99  --   --  97     --   --   --   --  162  --   --  258   INR  --   --   --   --   --   --   --   --  1.18*    < > = values in this interval not displayed.     No lab results found.    Invalid input(s): \"FERRITIN\"  Recent Labs   Lab Test 02/09/24 1219 02/04/24  0558 02/03/24  1238 02/03/24  1104 02/03/24  0911   POTASSIUM 5.2 4.3 4.9 5.5* 5.8*   CHLORIDE 111* 112*  --   --  104   BUN 43.4* 44.0*  --   --  76.1*     Recent Labs   Lab Test 02/09/24 1219 02/03/24  1331 02/03/24  0911   PROTEIN  --  Negative  --    ALBUMIN 3.1*  --  3.7   BILITOTAL 0.3  --  <0.2   AST 10  --  13   ALT 6  --  13       "

## 2024-02-09 NOTE — ED TRIAGE NOTES
Recent admission for GI bleeding and received blood products. Discharged 3-4 days ago and reports bloody stool today, increased weakness, and SOB worsening with exertion. Hx lung cancer but denies SOB at baseline. ABC in tact. A/OX4     Triage Assessment (Adult)       Row Name 02/09/24 1134          Triage Assessment    Airway WDL WDL        Respiratory WDL    Respiratory WDL WDL        Skin Circulation/Temperature WDL    Skin Circulation/Temperature WDL WDL        Cardiac WDL    Cardiac WDL WDL        Peripheral/Neurovascular WDL    Peripheral Neurovascular WDL WDL        Cognitive/Neuro/Behavioral WDL    Cognitive/Neuro/Behavioral WDL WDL

## 2024-02-09 NOTE — ED PROVIDER NOTES
ED ATTENDING PHYSICIAN NOTE:   I evaluated this patient in conjunction with Titus Randolph PA-C. I have participated in the care of the patient and personally performed key elements of the history, exam, and medical decision making.     HPI:   Zack Booth is a delightful 83 year old male with history of hypertension, prediabetes, CAD, atrial fibrillation on anticoagulation, metastatic adenocarcinoma of the lung on Keytruda, who presents to the emergency department for rectal bleeding. The patient states that today, he began experiencing melena. He reports that he was recently discharged from the hospital a week ago, in which he was admitted for a GI hemorrhage. He notes that today, he began experiencing shortness of breath and fatigue en route to the emergency department. Denies fevers. Denies dysuria or hematuria. Denies cough.  During patient's recent admission, when he was discharged on 2/5/2024, CTA showed no active site of GI bleed but did suggest duodenitis.  GI was consulted and performed an EGD, which showed duodenal peptic ulcer disease with signs of recent bleeding but no active bleeding.    Independent Historian:   None. Only the patient provided history.    Review of External Notes:  I personally reviewed notes from the patient's discharge summary dated  2/5/24 . This provided me with information regarding patient's baseline medical problems, patient's recent clinical course, and patient's recent hospitalization.      EXAM:   Patient Vitals for the past 24 hrs:   BP Temp Temp src Pulse Resp SpO2 Height Weight   02/09/24 1401 115/61 97.4  F (36.3  C) Oral 90 20 96 % -- --   02/09/24 1139 118/71 -- -- 96 -- 97 % -- --   02/09/24 1137 -- 97.7  F (36.5  C) Temporal -- 18 -- 1.829 m (6') 65.6 kg (144 lb 10 oz)      Physical Exam  Vitals and nursing note reviewed.   Constitutional:       Appearance: Normal appearance.   HENT:      Mouth/Throat:      Mouth: Mucous membranes are dry.   Cardiovascular:      Rate  and Rhythm: Normal rate and regular rhythm.      Heart sounds: Normal heart sounds.   Pulmonary:      Effort: Pulmonary effort is normal.      Breath sounds: Normal breath sounds.   Abdominal:      Palpations: Abdomen is soft.      Tenderness: There is no abdominal tenderness. There is no guarding or rebound.   Genitourinary:     Comments: No abnormal external findings. Melena  Skin:     General: Skin is warm and dry.   Neurological:      Mental Status: He is alert.         Independent Interpretation (X-rays, CTs, rhythm strip):  I independently interpreted the patient's chest x-ray; reassuring against infiltrate.    Consultations/Discussion of Management or Tests:  KRISTA talked to hospitalist KRISTA Newby who is accepting patient     Social Determinants of Health affecting care:   None.       MEDICAL DECISION MAKING/ASSESSMENT AND PLAN:   Patient presenting with melena and fatigue. Vital signs reassuring.  Considered differential including upper GI bleed, lower GI bleed, acute coronary syndrome, acute blood loss anemia, pneumonia, viral syndrome such as COVID or influenza, among others.  Patient lactate was within normal limits, reassuring against significant leukocytosis, possibly due to infectious process or symptomatic anemia and stress demargination.  Chest x-ray is reassuring against pneumonia.  Patient's troponin was elevated initially but suspect secondary to demand ischemia in the setting of GI bleed rather than acute coronary syndrome.  EKG was nonischemic.  Patient's hemoglobin here is 7.7.  No indication for transfusion at this time but will start pantoprazole IV and obtain consent for blood transfusion.  Patient will be admitted to hospitalist team for further evaluation and management.     DIAGNOSIS:     ICD-10-CM    1. Melena  K92.1       2. Anemia due to blood loss, acute  D62       3. Fatigue  R53.83       4. Leukocytosis  D72.829            DISPOSITION:   The patient was admitted to the hospital  under the care of Dr. Perales.      Rodrigo Palm MD  02/09/24 3761

## 2024-02-09 NOTE — H&P
[facility]    History and Physical - Hospitalist Service       Date of Admission:  2/9/2024    Assessment & Plan      Zack Booth is a 83 year old male with history of recent admission from 2/3-2/5 for melena, metastatic adenocarcinoma of the lung on Keytruda, PAD, CAD, atrial fibrillation not on anticoagulation, hypertension, prediabetes and hyperlipidemia being readmitted on 2/9/2024 for complaint of continued melena.     In the ED he is afebrile with heart rate of 96, pressure 118/71 and breathing comfortably on room air without hypoxia.  Lab work markable for creatinine 1.66, BUN 43.4, chloride 111, CO2 19, normal LFTs, glucose 122, lactic acid 1.0, BNP 1671, high-sensitivity troponin of 65, WBC 15.6, hemoglobin 7.7 and platelet count 206. ECG shows rate controlled atrial fibrillation.  COVID/influenza/RSV negative.  Chest x-ray is clear.  He was given Protonix 40 mg IV and normal saline 500 mL with request for admission.     # Melena  -Patient was admitted 2/3 through 2/5 for acute blood loss anemia due to GI bleeding  -CT angiogram at that time did not show any active bleeding but did show evidence of possible duodenitis with mild thickening and enhancement of the first and second portions of the duodenum  -GI did an EGD on 2/3 showing a normal esophagus but evidence of gastritis and nonbleeding duodenal ulcers  -He was told to stop taking NSAIDs and started on Protonix daily with discharged home on 2/5  -Started on Protonix drip  -GI consultation  -Clear liquid diet for now and n.p.o. at midnight in case further studies are needed    # Acute on chronic blood loss anemia  -Hgb 7.7  -Transfused 2 units of blood on last admission  -Hgb every 6 hours  -Conditional unit for <7    #Stage IV, Metastatic Adenocarcinoma of the Lung  #Bone Mets  -Followed by Oncology through [facility].  Diagnosed 12/2023.  Currently on Keytruda with last infusion 2/1/24. Pharmacy reviewed the literature  and there is report of bleeding in endometrial cancer but not gastrointestinal bleeding.    #CAD  #HTN  -Hx of PCI ~2000.  In 2013, he underwent cardiac catheterization and had a stent placed in his circumflex coronary. He did have a lesion of his LAD which was 60 to 70% and total occlusion of his right coronary artery.  Pt denies any chest pain.  Troponin flat 42/35   -Hold amlodipine and Spironolactone for now    -Continue holding aspirin in the setting of GIB     #Chronic COPD  #Tobacco Abuse  -Smokes a pack weekly  -No signs of acute exacerbation     #Hx SSS and Atrial Fibrillation  -Per chart review, diagnosed in 2013.  PPM was recommended of which patient declined.  On ASA only which is on hold     #CKD stage II  -Creatinine on admission 1.67 at baseline (1.4-1.8)  -Avoid nephrotoxins                Diet:  Clear liquid diet, NPO at midnight  DVT Prophylaxis: Pneumatic Compression Devices  Gordon Catheter: Not present  Lines: None     Cardiac Monitoring: None  Code Status:  DNR/DNI    Clinically Significant Risk Factors Present on Admission              # Hypoalbuminemia: Lowest albumin = 3.1 g/dL at 2/9/2024 12:19 PM, will monitor as appropriate   # Drug Induced Platelet Defect: home medication list includes an antiplatelet medication   # Hypertension: Noted on problem list                 Disposition Plan      Expected Discharge Date: 02/11/2024                The patient's care was discussed with the Attending Physician, Dr. Perales, Patient, Patient's Family, and ED Consultant(s).    Angeli Huggins PA-C  Hospitalist Service  [facility]  Securely message with [employer]TM) by [employer] (more info)  Text page via [facility] Paging/Directory     ______________________________________________________________________    Chief Complaint   Melena    History is obtained from the patient    History of Present Illness   Zack Booth is a 83 year old male who presents with melena.  He states that after  discharge she was doing well but developed melanotic diarrhea today without complaint of abdominal discomfort, nausea, vomiting, fever or chills.  He has been taking his medicines as prescribed and denies NSAID use but does continue to smoke a pack of cigarettes per week.  He feels short of breath with exertion but denies lightheadedness and dizziness.  He has a difficult time eating solids due to poor dentition.  He does not drink alcohol daily and has not had any falls recently.      Past Medical History    Past Medical History:   Diagnosis Date    Cancer (H)     Chronic kidney disease, stage III (moderate) (H)     3/4/2015    Congenital renal agenesis and dysgenesis     3/4/2015,Hospitalized following over-medication with Atenolol.    Coronary atherosclerosis     3/4/2015    Disturbance of skin sensation     3/4/2015    Essential hypertension     3/4/2015    Other and unspecified hyperlipidemia     3/4/2015    Other symptoms involving nervous and musculoskeletal systems     3/4/2015    Postsurgical percutaneous transluminal coronary angioplasty status     3/4/2015       Past Surgical History   Past Surgical History:   Procedure Laterality Date    APPENDECTOMY OPEN      No Comments Provided    ARTHROPLASTY KNEE      x7 knee surgeries.    ESOPHAGOSCOPY, GASTROSCOPY, DUODENOSCOPY (EGD), COMBINED N/A 2/3/2024    Procedure: Esophagoscopy, gastroscopy, duodenoscopy, combined;  Surgeon: Kavin Duong MD;  Location: [facility] OR    HEART CATH, ANGIOPLASTY      No Comments Provided    OTHER SURGICAL HISTORY      [id number],OTHER    VASECTOMY      No Comments Provided       Prior to Admission Medications   Prior to Admission Medications   Prescriptions Last Dose Informant Patient Reported? Taking?   HYDROmorphone (DILAUDID) 2 MG tablet   Yes No   Sig: Take 1-2 mg by mouth every 4 hours as needed for severe pain   Lidocaine (LIDOCARE) 4 % Patch   Yes No   Sig: Place 1 patch onto the skin every 24 hours To prevent lidocaine toxicity,  patient should be patch free for 12 hrs daily.   acetaminophen (TYLENOL) 500 MG tablet   Yes No   Sig: Take 1,000 mg by mouth 3 times daily   amLODIPine (NORVASC) 10 MG tablet   Yes No   Sig: Take 10 mg by mouth daily   aspirin (ASA) 325 MG EC tablet   No No   Sig: Take 1 tablet (325 mg) by mouth daily   cyanocobalamin (VITAMIN B-12) 1000 MCG tablet   Yes No   Sig: Take 1,000 mcg by mouth daily   nitroGLYcerin (NITROSTAT) 0.4 MG sublingual tablet   Yes No   Sig: Place 0.4 mg under the tongue every 5 minutes as needed for chest pain For chest pain place 1 tablet under the tongue every 5 minutes for 3 doses. If symptoms persist 5 minutes after 1st dose call 911.   pantoprazole (PROTONIX) 40 MG EC tablet   No No   Sig: Take 1 tablet (40 mg) by mouth 2 times daily for 90 days   senna-docusate (SENOKOT-S/PERICOLACE) 8.6-50 MG tablet   Yes No   Sig: Take 1 tablet by mouth 2 times daily   spironolactone (ALDACTONE) 100 MG tablet   Yes No   Sig: Take 100 mg by mouth daily      Facility-Administered Medications: None          Physical Exam   Vital Signs: Temp: 97.4  F (36.3  C) Temp src: Oral BP: 115/61 Pulse: 90   Resp: 20 SpO2: 96 % O2 Device: None (Room air)    Weight: 144 lbs 9.95 oz    General Appearance: Alert and oriented x 3.  Frail  Respiratory: Clear to auscultation bilaterally  Cardiovascular: Irregularly irregular without murmur  GI: Bowel sounds are present without tenderness  Skin: Multiple areas of ecchymosis noted on skin      Medical Decision Making       55 MINUTES SPENT BY ME on the date of service doing chart review, history, exam, documentation & further activities per the note.      Data     I have personally reviewed the following data over the past 24 hrs:    15.6 (H)  \   7.7 (L)   / 206     142 111 (H) 43.4 (H) /  122 (H)   5.2 19 (L) 1.66 (H) \     ALT: 6 AST: 10 AP: 76 TBILI: 0.3   ALB: 3.1 (L) TOT PROTEIN: 5.5 (L) LIPASE: N/A     Trop: 65 (H) BNP: 1,671     Procal: N/A CRP: N/A Lactic Acid: 1.0          Imaging results reviewed over the past 24 hrs:   Recent Results (from the past 24 hour(s))   XR Chest 2 Views    Narrative    XR CHEST 2 VIEWS 2/9/2024 1:53 PM    HISTORY: shortness of breath    COMPARISON: CT of the abdomen and pelvis 2/3/2024      Impression    IMPRESSION: No infiltrate, pleural effusion or pneumothorax. Small  lucency along the lateral right lung base is likely due to  superimposed soft tissue shadows. The cardiac and mediastinal  silhouettes are normal. Again seen are mildly displaced healing  fractures of the right lateral ninth and 10th. Callus formation  present around these fractures seen on the recent CT on 2/3/2024,  consistent with healing rib fractures    IRINA LAND MD         SYSTEM ID:  [id number]

## 2024-02-09 NOTE — PHARMACY-ADMISSION MEDICATION HISTORY
Pharmacist Admission Medication History    Admission medication history is complete. The information provided in this note is only as accurate as the sources available at the time of the update.    Information Source(s): Patient, Family member, and CareEverywhere/SureScripts via in-person    Pertinent Information:      Changes made to PTA medication list:  Added: None  Deleted: ASA, Dilaudid  Changed: Tylenol 1000mg tid-->daily, Scheduled Senna-S-->prn    Allergies reviewed with patient and updates made in EHR: yes    Medication History Completed By: Bere Desir PharmD 2/9/2024 2:31 PM    PTA Med List   Medication Sig Last Dose    acetaminophen (TYLENOL) 500 MG tablet Take 1,000 mg by mouth daily 2/8/2024    amLODIPine (NORVASC) 10 MG tablet Take 10 mg by mouth daily 2/9/2024 at am    cyanocobalamin (VITAMIN B-12) 1000 MCG tablet Take 1,000 mcg by mouth daily 2/9/2024 at am    Lidocaine (LIDOCARE) 4 % Patch Place 1 patch onto the skin every 24 hours To prevent lidocaine toxicity, patient should be patch free for 12 hrs daily. prn    nitroGLYcerin (NITROSTAT) 0.4 MG sublingual tablet Place 0.4 mg under the tongue every 5 minutes as needed for chest pain For chest pain place 1 tablet under the tongue every 5 minutes for 3 doses. If symptoms persist 5 minutes after 1st dose call 911. prn    pantoprazole (PROTONIX) 40 MG EC tablet Take 1 tablet (40 mg) by mouth 2 times daily for 90 days 2/9/2024 at am    senna-docusate (SENOKOT-S/PERICOLACE) 8.6-50 MG tablet Take 1 tablet by mouth 2 times daily as needed prn    spironolactone (ALDACTONE) 100 MG tablet Take 100 mg by mouth daily 2/9/2024 at am

## 2024-02-09 NOTE — ED NOTES
[facility]  ED Nurse Handoff Report    ED Chief complaint: Rectal Bleeding  . ED Diagnosis:   Final diagnoses:   Melena   Anemia due to blood loss, acute   Fatigue   Leukocytosis       Allergies:   Allergies   Allergen Reactions    Atenolol      Other reaction(s): Bradycardia    Lisinopril      Other reaction(s): *Unknown    Statins Muscle Pain (Myalgia)     Bad dreams    Ibuprofen      Other reaction(s): Headache       Code Status: Full Code    Activity level - Baseline/Home:  assist of 2.  Activity Level - Current:   lift.   Lift room needed: Yes.   Bariatric: No   Needed: No   Isolation: No.   Infection: Not Applicable.     Respiratory status: Room air    Vital Signs (within 30 minutes):   Vitals:    02/09/24 1137 02/09/24 1139   BP:  118/71   Pulse:  96   Resp: 18    Temp: 97.7  F (36.5  C)    TempSrc: Temporal    SpO2:  97%   Weight: 65.6 kg (144 lb 10 oz)    Height: 1.829 m (6')        Cardiac Rhythm:  ,      Pain level:    Patient confused: Yes.   Patient Falls Risk: assistive device/personal items within reach and activity supervised.   Elimination Status: Unable to void , brief in place    Patient Report - Initial Complaint: rectal bleeding.   Focused Assessment:    Recent admission for GI bleeding and received blood products. Discharged 3-4 days ago and reports bloody stool today, increased weakness, and SOB worsening with exertion. Hx lung cancer but denies SOB at baseline.      1338  Respiratory  JJ    RespiratoryAirway WDL: WDL  Respiratory WDLRespiratory WDL: .WDL except (dyspnea on exertion without hypoxia)      1338  Gastrointestinal  JJ    GastrointestinalGastrointestinal WDL: .WDL except (melena, incontinent.)      1338  Skin  JJ    SkinSkin WDL: .WDL except (pallor, weakness)        Abnormal Results:   Labs Ordered and Resulted from Time of ED Arrival to Time of ED Departure   COMPREHENSIVE METABOLIC PANEL - Abnormal       Result Value    Sodium 142      Potassium  5.2      Carbon Dioxide (CO2) 19 (*)     Anion Gap 12      Urea Nitrogen 43.4 (*)     Creatinine 1.66 (*)     GFR Estimate 40 (*)     Calcium 8.8      Chloride 111 (*)     Glucose 122 (*)     Alkaline Phosphatase 76      AST 10      ALT 6      Protein Total 5.5 (*)     Albumin 3.1 (*)     Bilirubin Total 0.3     TROPONIN T, HIGH SENSITIVITY - Abnormal    Troponin T, High Sensitivity 65 (*)    CBC WITH PLATELETS AND DIFFERENTIAL - Abnormal    WBC Count 15.6 (*)     RBC Count 2.48 (*)     Hemoglobin 7.7 (*)     Hematocrit 24.3 (*)     MCV 98      MCH 31.0      MCHC 31.7      RDW 14.9      Platelet Count 206      % Neutrophils 89      % Lymphocytes 4      % Monocytes 5      % Eosinophils 0      % Basophils 0      % Immature Granulocytes 2      NRBCs per 100 WBC 0      Absolute Neutrophils 13.7 (*)     Absolute Lymphocytes 0.7 (*)     Absolute Monocytes 0.8      Absolute Eosinophils 0.0      Absolute Basophils 0.0      Absolute Immature Granulocytes 0.3      Absolute NRBCs 0.0     NT PROBNP INPATIENT - Normal    N terminal Pro BNP Inpatient 1,671     LACTIC ACID WHOLE BLOOD - Normal    Lactic Acid 1.0     INFLUENZA A/B, RSV, & SARS-COV2 PCR   TROPONIN T, HIGH SENSITIVITY   TYPE AND SCREEN, ADULT    ABO/RH(D) O POS      Antibody Screen Negative      SPECIMEN EXPIRATION DATE [id number]     ABO/RH TYPE AND SCREEN        XR Chest 2 Views    (Results Pending)       Treatments provided: labs,   Family Comments: at bedside  OBS brochure/video discussed/provided to patient:  No  ED Medications:   Medications   sodium chloride 0.9% BOLUS 500 mL (has no administration in time range)   pantoprazole (PROTONIX) IV push injection 40 mg (has no administration in time range)       Drips infusing:  No  For the majority of the shift this patient was Green.   Interventions performed were n/a  .    Sepsis treatment initiated: No    Cares/treatment/interventions/medications to be completed following ED care: none      ED Nurse Name:  Meme Huggins RN  1:38 PM  RECEIVING UNIT ED HANDOFF REVIEW    Above ED Nurse Handoff Report was reviewed: Yes  Reviewed by: Jenna Meeks RN on February 9, 2024 at 2:40 PM

## 2024-02-10 NOTE — PLAN OF CARE
"Goal Outcome Evaluation:                      Upon morning assessment pt stated \"Can I just give up already\" - pt expressed hopelessness and fatigue from continued health conditions    Transfuse 1 Unit RBC  Voiding via external cath  A&Ox4  Family at bedside  Continued NPO  IVF @ 75  Continuous Protonix discontinued    Limb Alert added to LUE per VAT - concern for DVT seen on US for PIV start, MD notified, no tx at this time - will continue to monitor     C/O Left dorsal foot pain - no obvious s/sx upon assessment, MD DRISS notified   "

## 2024-02-10 NOTE — PLAN OF CARE
Goal Outcome Evaluation: Pt is alert and oriented x4. VSS. Denies pain, nausea. Skin is dry and flaky. Pt has extensive bruising on BUE, redness to sacrum, mepilex dressing applied to site. External catheter placed. Tolerated sips of clear liquid. Will be NPO at Midnight. Had GI consult tomorrow. Hemoglobin 7.6.       Plan of Care Reviewed With: patient    Overall Patient Progress: no change

## 2024-02-10 NOTE — PROGRESS NOTES
[facility]  Hospitalist Progress Note  Oleg Perales MD 02/10/2024    Reason for Stay (Diagnosis): melena, concern for recurrent UGIB         Assessment and Plan:      Summary of Stay: Zack Booth is a  83 year old male with history of recent admission from 2/3-2/5 for UGIB due to duodenal ulcer, metastatic adenocarcinoma of the lung on Keytruda, PAD, CAD, atrial fibrillation not on anticoagulation, hypertension, prediabetes and hyperlipidemia being readmitted on 2/9/2024 for complaint of dark BM.      In the ED he is afebrile with heart rate of 96, pressure 118/71 and breathing comfortably on room air without hypoxia.  Lab work markable for creatinine 1.66, BUN 43.4, chloride 111, CO2 19, normal LFTs, glucose 122, lactic acid 1.0, BNP 1671, high-sensitivity troponin of 65, WBC 15.6, hemoglobin 7.7 and platelet count 206. ECG shows rate controlled atrial fibrillation.  COVID/influenza/RSV negative.  Chest x-ray is clear.  He was given Protonix 40 mg IV and normal saline 500 mL with request for admission.     Plans today:  -GI consult  -Continue Protonix drip until GI evaluation  -Keep n.p.o. until GI evaluation in case EGD pursued  -Transfuse 1 unit packed red blood cells today  -Repeat hemoglobin this evening and again in the morning  -I updated the patient's son Kam at bedside today     # suspected melena due to acute blood loss anemia, with concern for recurrent UGIB in the setting of recent admit for UGIB due to duodenal ulcer  -Patient was admitted 2/3 through 2/5 for acute blood loss anemia due to GI bleeding  -CT angiogram at that time did not show any active bleeding but did show evidence of possible duodenitis with mild thickening and enhancement of the first and second portions of the duodenum  -EGD on 2/3 showed non-bleeding duodenal ulcers  -He was told to stop taking NSAIDs and started on Protonix daily with discharged home on 2/5  - currently being treated with PPI gtt since  admit  -GI consultation - ? Need for repeat EGD  -NPO until GI eval  - tx 1 unit PRBC today for hgb<7     # Acute on chronic blood loss anemia  -Hgb 7.7 on admit  - no BM since admit  -hgb this AM 6.9  - tx 1 unit PRBC today     #Stage IV, Metastatic Adenocarcinoma of the Lung  #Bone Mets  -Followed by Oncology through [facility].  Diagnosed 12/2023.  Currently on Keytruda with last infusion 2/1/24.      #CAD  #HTN  -Hx of PCI ~2000.  In 2013, he underwent cardiac catheterization and had a stent placed in his circumflex coronary. He did have a lesion of his LAD which was 60 to 70% and total occlusion of his right coronary artery.  Pt denies any chest pain.  Troponin flat 42/35   -Hold amlodipine and Spironolactone for now    -Continue holding aspirin in the setting of GIB     #Chronic COPD  #Tobacco Abuse  -Smokes a pack weekly  -No signs of acute exacerbation      #Hx SSS and Atrial Fibrillation  -Per chart review, diagnosed in 2013.  PPM was recommended of which patient declined.  On ASA only which is on hold     #CKD stage II  -Creatinine on admission 1.67 at baseline (1.4-1.8)  -Avoid nephrotoxins          DVT Prophylaxis: Pneumatic Compression Devices  Gordon Catheter: Not present  Lines: None     Cardiac Monitoring: None  Code Status:  DNR/DNI        Interval History (Subjective):      No bowel movement since admission.  Was having dark black stools that prompted his appearance in the emergency department yesterday.  Recent admission to the hospital for upper GI bleeding due to duodenal ulcers diagnosed on EGD.  Has stopped NSAID use.  He is not on any blood thinners currently.  Agreeable to packed red blood cell transfusion today.  Son Kam at bedside during my visit and updated                  Physical Exam:      Last Vital Signs:  /69 (BP Location: Left arm)   Pulse 80   Temp 96.8  F (36  C) (Temporal)   Resp 18   Ht 1.829 m (6')   Wt 65.6 kg (144 lb 10 oz)   SpO2 97%   BMI 19.61 kg/m         Intake/Output Summary (Last 24 hours) at 2/10/2024 1040  Last data filed at 2/10/2024 0600  Gross per 24 hour   Intake 200 ml   Output 325 ml   Net -125 ml       Constitutional: Awake, alert, cooperative, no apparent distress     Respiratory: Clear to auscultation bilaterally, no crackles or wheezing   Cardiovascular: Regular rate and rhythm, normal S1 and S2, and no murmur noted   Abdomen: Normal bowel sounds, soft, non-distended, non-tender   Skin: No rashes, no cyanosis, dry to touch   Neuro: Alert and oriented x3, no weakness, numbness, memory loss   Extremities: No edema, normal range of motion   Other(s):        All other systems: Negative          Medications:      All current medications were reviewed with changes reflected in problem list.         Data:      All new lab and imaging data was reviewed.   Labs:  Recent Labs   Lab 02/10/24  0643   WBC 12.0*   HGB 6.9*  6.9*   HCT 21.9*   MCV 95         Imaging:   Recent Results (from the past 24 hour(s))   XR Chest 2 Views    Narrative    XR CHEST 2 VIEWS 2/9/2024 1:53 PM    HISTORY: shortness of breath    COMPARISON: CT of the abdomen and pelvis 2/3/2024      Impression    IMPRESSION: No infiltrate, pleural effusion or pneumothorax. Small  lucency along the lateral right lung base is likely due to  superimposed soft tissue shadows. The cardiac and mediastinal  silhouettes are normal. Again seen are mildly displaced healing  fractures of the right lateral ninth and 10th. Callus formation  present around these fractures seen on the recent CT on 2/3/2024,  consistent with healing rib fractures    IRINA LAND MD         SYSTEM ID:  [id number]

## 2024-02-10 NOTE — PROGRESS NOTES
GASTROENTEROLOGY PROGRESS NOTE     IRP:    #1 Acute on chronic anemia with recent EGD showing duodenal ulcer with pigmented spot    Hb trend noted, 7.6-7.3-6.9; getting transfusion.   Vitals stable.   Likely mild rebleed, but doubt ongoing acute bleeding.    - IV protonix BID  - clears.  - carafate qid.  - Hb monitoring.   GI team will continue to monitor for urgent EGD need vs continued conservative management.          Maricel Herring MD  [facility]  [phone]      ________________________________________________________________________      SUBJECTIVE:  Denies any further black stools but appears severely depressed.       OBJECTIVE:  /59   Pulse 85   Temp 98.7  F (37.1  C) (Temporal)   Resp 18   Ht 1.829 m (6')   Wt 65.6 kg (144 lb 10 oz)   SpO2 95%   BMI 19.61 kg/m    Temp (24hrs), Av.8  F (36.6  C), Min:96.8  F (36  C), Max:98.7  F (37.1  C)    Patient Vitals for the past 72 hrs:   Weight   24 1137 65.6 kg (144 lb 10 oz)        PHYSICAL EXAM  GEN: No acute distress.   Psych: Depressed affect. Orientedx3.   ABD: soft, non-tender, non-distended, no rebound or guarding.    Additional Data:  I have reviewed the patient's new clinical lab results:     Recent Labs   Lab Test 02/10/24  0643 02/10/24  0026 24  1807 24  1219 24  1151 24  0558 24  1104 24  0911   WBC 12.0*  --   --  15.6*  --  12.2*  --  15.1*   HGB 6.9*  6.9* 7.3* 7.6* 7.7*   < > 8.0*  8.0*   < > 9.2*   MCV 95  --   --  98  --  99  --  97     --   --  206  --  162  --  258   INR  --   --   --   --   --   --   --  1.18*    < > = values in this interval not displayed.     Recent Labs   Lab Test 02/10/24  0643 24  1219 24  0558    142 138   POTASSIUM 4.1 5.2 4.3   CHLORIDE 108* 111* 112*   CO2 19* 19* 17*   BUN 38.6* 43.4* 44.0*   CR 1.44* 1.66* 1.28*   ANIONGAP 9 12 9   KAUR 8.3* 8.8 8.0*   GLC 90 122* 83     Recent Labs   Lab Test 24  1219  02/03/24  1331 02/03/24  0911   ALBUMIN 3.1*  --  3.7   BILITOTAL 0.3  --  <0.2   ALT 6  --  13   AST 10  --  13   PROTEIN  --  Negative  --        Total time: 35 minutes,  at least 50% time spent in coordination of care, counseling, and discussions with pt/family/team members.

## 2024-02-10 NOTE — PLAN OF CARE
Goal Outcome Evaluation:      Plan of Care Reviewed With: patient    END OF SHIFT SUMMARY     2300 - 0700    Pt alert and oriented x 4. Deies pain. On RA. IVF infusing. External catheter in place. NPO. X 2 assist.

## 2024-02-11 NOTE — PLAN OF CARE
Goal Outcome Evaluation:       Pt is alert and oriented, denied pain and nausea. Tolerated clear liquid diet. One unit of packed RBC completed, Hbg rechecked, hgb 8.1. PCD on, encouraged ankle pump exercises and inspirometer use. Skin and IV site reassessed. Linda and skin care done. Has multiple bruising and scabs on upper arm and forearm. VSS. No BM this sift. Will continue to monitor.

## 2024-02-11 NOTE — PLAN OF CARE
Goal Outcome Evaluation:    [id number]       A&O x4, on room air. Denies pain. Myles clear liquid diet, order placed to advance as tolerated, pt not hungry. Hgb 8.1 this morning. No BM this shift, no bleeding noted. External cath in place draining clear yellow urine. 2 PIVs saline locked. Mg and K protocol, no replacement needed. Pt refused 3 attempts to get him out of bed and up in chair. Reports that he ambulates at home with a cane. Not OOB this shift. Plan to monitor Hgb overnight and if stable, home tomorrow.

## 2024-02-11 NOTE — PROGRESS NOTES
[facility]  Hospitalist Progress Note  Oleg Perales MD 02/11/2024    Reason for Stay (Diagnosis): GIB         Assessment and Plan:      Summary of Stay: Zack Booth is a 83 year old male with history of recent admission from 2/3-2/5 for UGIB due to duodenal ulcer, metastatic adenocarcinoma of the lung on Keytruda, PAD, CAD, atrial fibrillation not on anticoagulation, hypertension, prediabetes and hyperlipidemia being readmitted on 2/9/2024 for complaint of dark BM.      In the ED he is afebrile with heart rate of 96, pressure 118/71 and breathing comfortably on room air without hypoxia.  Lab work markable for creatinine 1.66, BUN 43.4, chloride 111, CO2 19, normal LFTs, glucose 122, lactic acid 1.0, BNP 1671, high-sensitivity troponin of 65, WBC 15.6, hemoglobin 7.7 and platelet count 206. ECG shows rate controlled atrial fibrillation.  COVID/influenza/RSV negative.  Chest x-ray is clear.  He was given Protonix 40 mg IV and normal saline 500 mL with request for admission.     Since admission, the patient has had no bowel movements.  Hemoglobin on admission was 7.7 and dropped to 6.9 after admission.  He was transfused 1 unit packed red blood cell on 2/10.  Hemoglobin improved to 8.1 and has remained stable without any evidence of ongoing bleeding    GI evaluated the patient on 2/10.  They recommended clear liquid diet and the addition of Carafate.      Plans today:  -Continue clears until evaluation by GI today  -Continue PPI and Carafate  -Will change IV PPI to p.o.  -Anticipate discharge when okay with GI service; likely later today or tomorrow  -I updated the patient's son Kam at bedside today     # suspected melena due to acute blood loss anemia, due to recurrent UGIB in the setting of known duodenal ulcer  -Patient was admitted 2/3 through 2/5 for acute blood loss anemia due to GI bleeding  -CT angiogram at that time did not show any active bleeding but did show evidence of possible  duodenitis with mild thickening and enhancement of the first and second portions of the duodenum  -EGD on 2/3 showed non-bleeding duodenal ulcers  -He was told to stop taking NSAIDs and started on Protonix daily with discharged home on 2/5  -Continue PPI twice daily  -Continue Carafate  -No BM since admission, no evidence of ongoing GI bleeding  -Discharge when okay with GI service     # Acute on chronic blood loss anemia  -Hgb 7.7 on admit  - no BM since admit  -hgb this AM 6.9  - tx 1 unit PRBC on 2/10 with follow-up hemoglobin stable near 8     #Stage IV, Metastatic Adenocarcinoma of the Lung  #Bone Mets  -Follow[facility]cology [facility].  Diagnosed 12/2023.  Currently on Keytruda with last infusion 2/1/24.      #CAD  #HTN  -Hx of PCI ~2000.  In 2013, he underwent cardiac catheterization and had a stent placed in his circumflex coronary. He did have a lesion of his LAD which was 60 to 70% and total occlusion of his right coronary artery.  Pt denies any chest pain.  Troponin flat 42/35   -Hold amlodipine and Spironolactone for now    -Continue holding aspirin in the setting of GIB     #Chronic COPD  #Tobacco Abuse  -Smokes a pack weekly  -No signs of acute exacerbation      #Hx SSS and Atrial Fibrillation  -Per chart review, diagnosed in 2013.  PPM was recommended of which patient declined.  On ASA only which is on hold     #CKD stage II  -Creatinine on admission 1.67 at baseline (1.4-1.8)  -Avoid nephrotoxins           DVT Prophylaxis: Pneumatic Compression Devices  Gordon Catheter: Not present  Lines: None     Cardiac Monitoring: None  Code Status:  DNR/DNI  DISPO: When okay with GI service.  Anticipate later today or tomorrow           Interval History (Subjective):      No new concerns or complaints today.  Tolerating clear liquid diet.  No bowel movement since admission.  Hemoglobin stable near 8 after transfusion yesterday.                  Physical Exam:      Last Vital Signs:  /62 (BP  Location: Right arm)   Pulse 74   Temp 98.1  F (36.7  C) (Temporal)   Resp 20   Ht 1.829 m (6')   Wt 65.6 kg (144 lb 10 oz)   SpO2 94%   BMI 19.61 kg/m        Intake/Output Summary (Last 24 hours) at 2/11/2024 1032  Last data filed at 2/11/2024 0900  Gross per 24 hour   Intake 1907 ml   Output 1950 ml   Net -43 ml       Constitutional: Awake, alert, cooperative, no apparent distress     Respiratory: Clear to auscultation bilaterally, no crackles or wheezing   Cardiovascular: Regular rate and rhythm, normal S1 and S2, and no murmur noted   Abdomen: Normal bowel sounds, soft, non-distended, non-tender   Skin: No rashes, no cyanosis, dry to touch   Neuro: Alert and oriented x3, no weakness, numbness, memory loss   Extremities: No edema, normal range of motion   Other(s):        All other systems: Negative          Medications:      All current medications were reviewed with changes reflected in problem list.         Data:      All new lab and imaging data was reviewed.   Labs:  Recent Labs   Lab 02/11/24  0616 02/10/24  1813 02/10/24  0643   WBC  --   --  12.0*   HGB 8.1*   < > 6.9*  6.9*   HCT  --   --  21.9*   MCV  --   --  95   PLT  --   --  235    < > = values in this interval not displayed.      Imaging:   No results found for this or any previous visit (from the past 24 hour(s)).

## 2024-02-11 NOTE — PROGRESS NOTES
GASTROENTEROLOGY PROGRESS NOTE     IRP:  #1 Acute on chronic anemia with recent EGD showing duodenal ulcer with pigmented spot   #2 Depressed mood    Hb appropriate response to PRBC, stable trend. Denies any new melena/bleeding.    - PO protonix 40 mg q12.  - carafate qid.  - Ok to advance diet.   - Review mood/depression management.  - Continue Hb trending. If stable by tomorrow, could discharge tomorrow with PO meds as above for 2months.    GI team will follow.        Maricel Herring MD  [facility]  [phone]      ________________________________________________________________________      SUBJECTIVE:  Denies any new melena/bleeding.       OBJECTIVE:  /70 (BP Location: Left arm)   Pulse 74   Temp 98.1  F (36.7  C) (Temporal)   Resp 20   Ht 1.829 m (6')   Wt 65.6 kg (144 lb 10 oz)   SpO2 94%   BMI 19.61 kg/m    Temp (24hrs), Av  F (36.7  C), Min:97.7  F (36.5  C), Max:98.1  F (36.7  C)    Patient Vitals for the past 72 hrs:   Weight   24 1137 65.6 kg (144 lb 10 oz)        PHYSICAL EXAM  GEN: No acute distress.    ABD: soft, non-tender, non-distended, no rebound or guarding.  Psych: Appears very depressed.    Additional Data:  I have reviewed the patient's new clinical lab results:     Recent Labs   Lab Test 24  0616 02/10/24  1813 02/10/24  0643 24  1807 24  1219 24  1151 24  0558 24  1104 24  0911   WBC  --   --  12.0*  --  15.6*  --  12.2*  --  15.1*   HGB 8.1* 8.1* 6.9*  6.9*   < > 7.7*   < > 8.0*  8.0*   < > 9.2*   MCV  --   --  95  --  98  --  99  --  97   PLT  --   --  235  --  206  --  162  --  258   INR  --   --   --   --   --   --   --   --  1.18*    < > = values in this interval not displayed.     Recent Labs   Lab Test 24  0616 02/10/24  0643 24  1219 24  0558   NA  --  136 142 138   POTASSIUM 4.2 4.1 5.2 4.3   CHLORIDE  --  108* 111* 112*   CO2  --  19* 19* 17*   BUN  --  38.6* 43.4* 44.0*   CR  --   1.44* 1.66* 1.28*   ANIONGAP  --  9 12 9   KAUR  --  8.3* 8.8 8.0*   GLC  --  90 122* 83     Recent Labs   Lab Test 02/09/24  1219 02/03/24  1331 02/03/24  0911   ALBUMIN 3.1*  --  3.7   BILITOTAL 0.3  --  <0.2   ALT 6  --  13   AST 10  --  13   PROTEIN  --  Negative  --        Total time: 35 minutes,  at least 50% time spent in coordination of care, counseling, and discussions with pt/family/team members.

## 2024-02-11 NOTE — DISCHARGE SUMMARY
[facility]  Hospitalist Discharge Summary      Date of Admission:  2/9/2024  Date of Discharge:  2/12/2024  Discharging Provider: Oleg Perales MD  Discharge Service: Hospitalist Service    Discharge Diagnoses   Acute blood loss anemia, suspect due to upper GI bleed from recently diagnosed duodenal ulcer  -Patient received 1 unit packed red blood cell transfusion this admission with discharge hemoglobin near 8  - continue PPI bid   - Carafate qid x 14 days on discharge    Recent admission to the hospital 2/3/2024 through 2/5/2024 for acute GI bleed due to duodenal ulcer  -EGD was performed that admission to diagnose duodenal ulcer  -Advised to discontinue NSAID medications    Past medical history:  -Metastatic adenocarcinoma of the lung, currently being treated with Keytruda  -Peripheral arterial disease  -Coronary artery disease  -Atrial fibrillation, not maintained on oral anticoagulation  -Hypertension  -Hyperlipidemia  -Prediabetes mellitus    Clinically Significant Risk Factors          Follow-ups Needed After Discharge   Follow-up Appointments     Follow-up and recommended labs and tests       Stop amlodipine.  Your blood pressure remained near normal this admission   without requiring this medication.  If your blood pressure increases in   the future your doctor may decide to resume the medication at that time    See your doctor in 3-5 days.  Recommend to recheck your hemoglobin at that   visit.  Your hemoglobin is near 8 at the time of discharge from the   hospital    Avoid taking any aspirin, ibuprofen, naprosyn, or other NSAID medications   as these can cause stomach ulcers and bleeding            Unresulted Labs Ordered in the Past 30 Days of this Admission       Date and Time Order Name Status Description    2/3/2024  2:04 PM Prepare red blood cells (unit) Preliminary             Discharge Disposition   Discharged to home  Condition at discharge: Stable    Hospital Course    83 year old male with history of recent admission from 2/3-2/5 for UGIB due to duodenal ulcer, metastatic adenocarcinoma of the lung on Keytruda, PAD, CAD, atrial fibrillation not on anticoagulation, hypertension, prediabetes and hyperlipidemia being readmitted on 2/9/2024 for complaint of dark BM.      In the ED he is afebrile with heart rate of 96, pressure 118/71 and breathing comfortably on room air without hypoxia.  Lab work markable for creatinine 1.66, BUN 43.4, chloride 111, CO2 19, normal LFTs, glucose 122, lactic acid 1.0, BNP 1671, high-sensitivity troponin of 65, WBC 15.6, hemoglobin 7.7 and platelet count 206. ECG shows rate controlled atrial fibrillation.  COVID/influenza/RSV negative.  Chest x-ray is clear.  He was given Protonix 40 mg IV and normal saline 500 mL with request for admission.      Since admission, the patient has had no bowel movements.  Hemoglobin on admission was 7.7 and dropped to 6.9 after admission.  He was transfused 1 unit packed red blood cell on 2/10.  Hemoglobin improved to 8.1 and has remained stable without any evidence of ongoing bleeding     GI evaluated the patient on 2/10.  They recommended the addition of Carafate 4 times daily.  Given that appeared the patient's bleeding stopped spontaneously, EGD was not performed this admission.  Clinically it was suspected the patient likely had some oozing from his recently diagnosed duodenal ulcers    The patient was cleared by GI service for discharge home today.  He was discharged on PPI and Carafate therapy    Consultations This Hospital Stay   GASTROENTEROLOGY IP CONSULT    Code Status   No CPR- Do NOT Intubate    Time Spent on this Encounter   I, Oleg Perales MD, personally saw the patient today and spent less than or equal to 30 minutes discharging this patient.       Oleg Perales MD  [facility]  [address] [place] [address]  Phone: [phone]  Fax:  [phone]  ______________________________________________________________________    Physical Exam   Vital Signs: Temp: 98.1  F (36.7  C) Temp src: Temporal BP: 126/62 Pulse: 74   Resp: 20 SpO2: 94 % O2 Device: None (Room air)    Weight: 144 lbs 9.95 oz         Primary Care Physician   Simón Silveira    Discharge Orders      Reason for your hospital stay    GI bleeding, suspected to be from recently diagnosed duodenal ulcer     Follow-up and recommended labs and tests     Stop amlodipine.  Your blood pressure remained near normal this admission without requiring this medication.  If your blood pressure increases in the future your doctor may decide to resume the medication at that time    See your doctor in 3-5 days.  Recommend to recheck your hemoglobin at that visit.  Your hemoglobin is near 8 at the time of discharge from the hospital    Avoid taking any aspirin, ibuprofen, naprosyn, or other NSAID medications as these can cause stomach ulcers and bleeding     Activity    Your activity upon discharge: activity as tolerated     Diet    Follow this diet upon discharge: regular diet       Significant Results and Procedures   Most Recent 3 CBC's:  Recent Labs   Lab Test 02/11/24  0616 02/10/24  1813 02/10/24  0643 02/09/24  1807 02/09/24  1219 02/04/24  1151 02/04/24  0558   WBC  --   --  12.0*  --  15.6*  --  12.2*   HGB 8.1* 8.1* 6.9*  6.9*   < > 7.7*   < > 8.0*  8.0*   MCV  --   --  95  --  98  --  99   PLT  --   --  235  --  206  --  162    < > = values in this interval not displayed.       Discharge Medications   Current Discharge Medication List        START taking these medications    Details   sucralfate (CARAFATE) 1 GM/10ML suspension Take 10 mLs (1 g) by mouth 4 times daily (before meals and nightly) for 14 days  Qty: 560 mL, Refills: 0    Associated Diagnoses: Gastrointestinal hemorrhage associated with duodenal ulcer           CONTINUE these medications which have NOT CHANGED    Details    acetaminophen (TYLENOL) 500 MG tablet Take 1,000 mg by mouth daily      cyanocobalamin (VITAMIN B-12) 1000 MCG tablet Take 1,000 mcg by mouth daily      Lidocaine (LIDOCARE) 4 % Patch Place 1 patch onto the skin every 24 hours To prevent lidocaine toxicity, patient should be patch free for 12 hrs daily.      nitroGLYcerin (NITROSTAT) 0.4 MG sublingual tablet Place 0.4 mg under the tongue every 5 minutes as needed for chest pain For chest pain place 1 tablet under the tongue every 5 minutes for 3 doses. If symptoms persist 5 minutes after 1st dose call 911.      pantoprazole (PROTONIX) 40 MG EC tablet Take 1 tablet (40 mg) by mouth 2 times daily for 90 days  Qty: 180 tablet, Refills: 0    Associated Diagnoses: Acute GI bleeding; Gastrointestinal hemorrhage associated with duodenal ulcer      senna-docusate (SENOKOT-S/PERICOLACE) 8.6-50 MG tablet Take 1 tablet by mouth 2 times daily as needed      spironolactone (ALDACTONE) 100 MG tablet Take 100 mg by mouth daily           STOP taking these medications       amLODIPine (NORVASC) 10 MG tablet Comments:   Reason for Stopping:             Allergies   Allergies   Allergen Reactions    Atenolol      Other reaction(s): Bradycardia    Lisinopril      Other reaction(s): *Unknown    Statins Muscle Pain (Myalgia)     Bad dreams    Ibuprofen      Other reaction(s): Headache

## 2024-02-11 NOTE — PLAN OF CARE
A&Ox4. VSS. No complains of pain. No complains of sob or chest pain.   Pure wick in place. 2 PIV one saline lock, other iv fluids running. Clear liquid diet.   Bruises of both arm. Meplex on buttocks.     Vital signs:  Temp: 98  F (36.7  C) Temp src: Temporal BP: 104/47 Pulse: 74   Resp: 20 SpO2: 95 % O2 Device: None (Room air)   Height: 182.9 cm (6') Weight: 65.6 kg (144 lb 10 oz)  Estimated body mass index is 19.61 kg/m  as calculated from the following:    Height as of this encounter: 1.829 m (6').    Weight as of this encounter: 65.6 kg (144 lb 10 oz).

## 2024-02-12 NOTE — PROGRESS NOTES
GASTROENTEROLOGY PROGRESS NOTE     IRP:    #1 Acute on chronic anemia with recent EGD showing duodenal ulcer with pigmented spot    HGB improved following transfusion and remains stable. No further bleeding. Continue PPI and carafate as ordered. Not on anticoagulation.     Ok to discharge today from GI standpoint.   ______________________________________________________________________      SUBJECTIVE: No stools overnight or this am. Denies abdominal pain.        OBJECTIVE:  /74 (BP Location: Right arm)   Pulse 87   Temp 97.9  F (36.6  C) (Temporal)   Resp 15   Ht 1.829 m (6')   Wt 65.6 kg (144 lb 10 oz)   SpO2 94%   BMI 19.61 kg/m    Temp (24hrs), Av.8  F (36.6  C), Min:96.8  F (36  C), Max:98.7  F (37.1  C)    No data found.       PHYSICAL EXAM  GEN: No acute distress.   Psych: Depressed affect. Orientedx3.   ABD: soft, non-tender, non-distended, no rebound or guarding.    Additional Data:  I have reviewed the patient's new clinical lab results:     Recent Labs   Lab Test 24  0657 24  0616 02/10/24  1813 02/10/24  0643 24  1807 24  1219 24  1151 24  0558 24  1104 24  0911   WBC  --   --   --  12.0*  --  15.6*  --  12.2*  --  15.1*   HGB 8.5* 8.1* 8.1* 6.9*  6.9*   < > 7.7*   < > 8.0*  8.0*   < > 9.2*   MCV  --   --   --  95  --  98  --  99  --  97   PLT  --   --   --  235  --  206  --  162  --  258   INR  --   --   --   --   --   --   --   --   --  1.18*    < > = values in this interval not displayed.     Recent Labs   Lab Test 24  0657 24  0616 02/10/24  0643 24  1219 24  0558   NA  --   --  136 142 138   POTASSIUM 4.3 4.2 4.1 5.2 4.3   CHLORIDE  --   --  108* 111* 112*   CO2  --   --  19* 19* 17*   BUN  --   --  38.6* 43.4* 44.0*   CR  --   --  1.44* 1.66* 1.28*   ANIONGAP  --   --  9 12 9   KAUR  --   --  8.3* 8.8 8.0*   GLC  --   --  90 122* 83     Recent Labs   Lab Test 24  1219 24  1331 24  0911    ALBUMIN 3.1*  --  3.7   BILITOTAL 0.3  --  <0.2   ALT 6  --  13   AST 10  --  13   PROTEIN  --  Negative  --        Total time: 35 minutes,  at least 50% time spent in coordination of care, counseling, and discussions with pt/family/team members.

## 2024-02-12 NOTE — PLAN OF CARE
"Goal Outcome Evaluation:       [id number]     A&O x4, on room air. Denies pain. Myles regular diet, poor appetite. Hgb 8.5 this morning. No BM this shift, no bleeding noted. External cath in place draining clear yellow urine. PIV saline locked. Mg and K protocol, no replacement needed. Pt refused multiple attempts to get him out of bed stating, \"I really don't want to.\" Tired today, sleeping between cares. GI signed off. Pt discharged home with wife at 1500.                "

## 2024-02-12 NOTE — PLAN OF CARE
Goal Outcome Evaluation:  Vital stable.  Denies pain. Refused to get up and out of bed with assist.  Tolerated diet, no nausea.  Hgb 8.1. No BM.  Voiding, external catheter in use, same changed at hs.  Bruising and scabbing to bilateral arms.  Falls risk precautions.

## 2024-02-12 NOTE — PLAN OF CARE
Goal Outcome Evaluation:    Nursing Summary:    [id number]    Pt is alert and oriented. Pt denies any pain or shortness of breath and on room air. Pt denies nausea.     Pt is 2 assist with cares. No BM on this shift. External catheter draining, changed and care completed. Ongoing monitoring.     GI following.     /68 (BP Location: Left leg)   Pulse 81   Temp 97.3  F (36.3  C) (Temporal)   Resp 20   Ht 1.829 m (6')   Wt 65.6 kg (144 lb 10 oz)   SpO2 97%   BMI 19.61 kg/m

## 2024-02-12 NOTE — PROGRESS NOTES
I saw and examined this patient today    No signs of GI bleeding since admission    Hemoglobin remained stable in the 8 range after 1 unit packed red blood cell transfusion 2 days ago    Await GI input today; anticipate discharge home if okay with GI service    Plans d/w bedside nurse

## 2024-02-12 NOTE — CARE PLAN
Chart check:    No overt bleeding noted.  OK to discharge from GI standpoint on PO protonix 40 mg q12 and - carafate qid. . OK to resume anti coagulation if cardioprudent. Given his ongoing cancer treatment, always risk of mucosal erosions--> bleeding exacerbated due to anticoagulation

## 2024-04-04 PROBLEM — E87.5 HYPERKALEMIA: Status: ACTIVE | Noted: 2024-01-01

## 2024-04-04 PROBLEM — N17.9 AKI (ACUTE KIDNEY INJURY) (H): Status: ACTIVE | Noted: 2024-01-01

## 2024-04-04 NOTE — ED NOTES
[facility]  ED Nurse Handoff Report    ED Chief complaint: Abnormal Labs  . ED Diagnosis:   Final diagnoses:   MARIPOSA (acute kidney injury) (H24)   Hyperkalemia       Allergies:   Allergies   Allergen Reactions    Atenolol      Other reaction(s): Bradycardia    Lisinopril      Other reaction(s): *Unknown    Statins Muscle Pain (Myalgia)     Bad dreams    Ibuprofen      Other reaction(s): Headache       Code Status: DNR / DNI    Activity level - Baseline/Home:  independent.  Activity Level - Current:   standby.   Lift room needed: No.   Bariatric: No   Needed: No   Isolation: No.   Infection: Not Applicable.     Respiratory status: Room air    Vital Signs (within 30 minutes):   Vitals:    04/04/24 1608 04/04/24 1618 04/04/24 1633 04/04/24 1643   BP:  (!) 160/97 (!) 141/74 (!) 137/103   Pulse:  72 75 76   Resp: 12  13    Temp:       TempSrc:       SpO2:  96%  96%   Weight:       Height:           Cardiac Rhythm:  ,      Pain level:    Patient confused: No.   Patient Falls Risk: nonskid shoes/slippers when out of bed, patient and family education, and assistive device/personal items within reach.   Elimination Status: Has voided     Patient Report - Initial Complaint: Patient referred for elevated K+ of 6 at clinic. ABCs intact, A&Ox4.     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, creatinine found to be increased since last labs (see labs))        Cardiac (Adult)Cardiac WDL: WDL     Neuro CognitiveCognitive/Neuro/Behavioral WDL: WDL  Aleks Coma ScaleBest Eye Response: 4-->(E4) spontaneousBest Motor Response: 6-->(M6) obeys commandsBest Verbal Response: 5-->(V5) orientedGlasgow Coma Scale Score: 15    Abnormal Results:   Labs Ordered and Resulted from Time of ED Arrival to Time of ED Departure   BASIC METABOLIC PANEL - Abnormal       Result Value    Sodium 137      Potassium 5.7 (*)     Chloride 102      Carbon Dioxide (CO2) 19 (*)      Anion Gap 16 (*)     Urea Nitrogen 67.5 (*)     Creatinine 5.46 (*)     GFR Estimate 10 (*)     Calcium 9.4      Glucose 88     CBC WITH PLATELETS AND DIFFERENTIAL - Abnormal    WBC Count 11.5 (*)     RBC Count 3.66 (*)     Hemoglobin 9.6 (*)     Hematocrit 31.7 (*)     MCV 87      MCH 26.2 (*)     MCHC 30.3 (*)     RDW 15.5 (*)     Platelet Count 440      % Neutrophils 84      % Lymphocytes 7      % Monocytes 6      % Eosinophils 0      % Basophils 1      % Immature Granulocytes 2      NRBCs per 100 WBC 0      Absolute Neutrophils 9.7 (*)     Absolute Lymphocytes 0.8      Absolute Monocytes 0.7      Absolute Eosinophils 0.0      Absolute Basophils 0.1      Absolute Immature Granulocytes 0.2      Absolute NRBCs 0.0     GLUCOSE BY METER - Normal    GLUCOSE BY METER POCT 87     GLUCOSE MONITOR NURSING POCT   GLUCOSE MONITOR NURSING POCT   POTASSIUM   GLUCOSE MONITOR NURSING POCT        No orders to display       Treatments provided: see MAR  Family Comments: son and wife at bedside  OBS brochure/video discussed/provided to patient:  No  ED Medications:   Medications   glucose gel 15-30 g (has no administration in time range)     Or   dextrose 50 % injection 25-50 mL (has no administration in time range)     Or   glucagon injection 1 mg (has no administration in time range)   dextrose 10% BOLUS 300 mL (300 mLs Intravenous $New Bag 4/4/24 1613)   sodium chloride 0.9% BOLUS 1,000 mL (1,000 mLs Intravenous $New Bag 4/4/24 1545)   dextrose 50 % injection 25 g (25 g Intravenous $Given 4/4/24 1612)   insulin regular 1 unit/mL injection 6.3 Units (6.3 Units Intravenous $Given 4/4/24 1613)       Drips infusing:  Yes  For the majority of the shift this patient was Green.   Interventions performed were n/a.    Sepsis treatment initiated: No    Cares/treatment/interventions/medications to be completed following ED care: see orders, monitor BG, monitor creat/kidney function    ED Nurse Name: Flores Ansari RN  4:56  PM    RECEIVING UNIT ED HANDOFF REVIEW    Above ED Nurse Handoff Report was reviewed: Yes  Reviewed by: Celine Salazar, RN on April 4, 2024 at 5:26 PM   I Colin called the ED to inform them the note was read: Yes

## 2024-04-04 NOTE — ED TRIAGE NOTES
Patient referred for elevated K+ of 6 at clinic.  ABCs intact, A&Ox4.     Triage Assessment (Adult)       Row Name 04/04/24 1342          Triage Assessment    Airway WDL WDL        Respiratory WDL    Respiratory WDL WDL        Skin Circulation/Temperature WDL    Skin Circulation/Temperature WDL WDL        Cardiac WDL    Cardiac WDL WDL        Peripheral/Neurovascular WDL    Peripheral Neurovascular WDL WDL        Cognitive/Neuro/Behavioral WDL    Cognitive/Neuro/Behavioral WDL WDL

## 2024-04-04 NOTE — PHARMACY-ADMISSION MEDICATION HISTORY
Pharmacist Admission Medication History    Admission medication history is complete. The information provided in this note is only as accurate as the sources available at the time of the update.    Information Source(s): Patient via in-person    Pertinent Information: none    Changes made to PTA medication list:  Added: None  Deleted: lidocaine patch  Changed: APAP to prn    Allergies reviewed with patient and updates made in EHR: yes    Medication History Completed By: Joseph Cortez RPH 4/4/2024 5:26 PM    PTA Med List   Medication Sig Last Dose    acetaminophen (TYLENOL) 500 MG tablet Take 1,000 mg by mouth daily as needed 4/4/2024 at am    cyanocobalamin (VITAMIN B-12) 1000 MCG tablet Take 1,000 mcg by mouth daily 4/4/2024 at am    nitroGLYcerin (NITROSTAT) 0.4 MG sublingual tablet Place 0.4 mg under the tongue every 5 minutes as needed for chest pain For chest pain place 1 tablet under the tongue every 5 minutes for 3 doses. If symptoms persist 5 minutes after 1st dose call 911. prn    pantoprazole (PROTONIX) 40 MG EC tablet Take 1 tablet (40 mg) by mouth 2 times daily for 90 days 4/4/2024 at am    senna-docusate (SENOKOT-S/PERICOLACE) 8.6-50 MG tablet Take 1 tablet by mouth 2 times daily as needed prn    spironolactone (ALDACTONE) 100 MG tablet Take 100 mg by mouth daily 4/4/2024 at am

## 2024-04-04 NOTE — ED PROVIDER NOTES
"  History     Chief Complaint:  Abnormal Labs       HPI   Zack Booth is a 84 year old male presenting from clinic with hyperkalemia.  He was recently diagnosed with lung cancer, and is presenting to the oncology clinic for a chemotherapy treatment.  They checked his potassium and it was 6.0.  He was instructed to present to the emergency room.  He denies any symptoms.  No fever, chills, chest pain, shortness of breath, nausea, vomiting, abdominal pain, hematuria, dysuria, constipation, diarrhea.  He reports he has been eating and drinking appropriately.        Independent Historian:   Patient only    Review of External Notes:   2/11/24: Discharge summary reviewed       Medications:    No current outpatient medications on file.      Past Medical History:    Past Medical History:   Diagnosis Date    Cancer (H)     Chronic kidney disease, stage III (moderate) (H)     Congenital renal agenesis and dysgenesis     Coronary atherosclerosis     Disturbance of skin sensation     Essential hypertension     Other and unspecified hyperlipidemia     Other symptoms involving nervous and musculoskeletal systems     Postsurgical percutaneous transluminal coronary angioplasty status        Past Surgical History:    Past Surgical History:   Procedure Laterality Date    APPENDECTOMY OPEN      No Comments Provided    ARTHROPLASTY KNEE      x7 knee surgeries.    ESOPHAGOSCOPY, GASTROSCOPY, DUODENOSCOPY (EGD), COMBINED N/A 2/3/2024    Procedure: Esophagoscopy, gastroscopy, duodenoscopy, combined;  Surgeon: Kavin Duong MD;  Location: [facility] OR    HEART CATH, ANGIOPLASTY      No Comments Provided    OTHER SURGICAL HISTORY      [id number],OTHER    VASECTOMY      No Comments Provided        Physical Exam   Patient Vitals for the past 24 hrs:   BP Temp Temp src Pulse Resp SpO2 Height Weight   04/04/24 1749 (!) 156/72 97.3  F (36.3  C) Oral 61 20 99 % 1.727 m (5' 8\") 63.4 kg (139 lb 11.2 oz)   04/04/24 1718 131/79 -- -- 67 -- 97 % -- -- " "  04/04/24 1708 -- -- -- -- 19 -- -- --   04/04/24 1658 135/75 -- -- 72 22 98 % -- --   04/04/24 1643 (!) 137/103 -- -- 76 -- 96 % -- --   04/04/24 1633 (!) 141/74 -- -- 75 13 -- -- --   04/04/24 1618 (!) 160/97 -- -- 72 -- 96 % -- --   04/04/24 1608 -- -- -- -- 12 -- -- --   04/04/24 1558 (!) 150/89 -- -- 74 -- 96 % -- --   04/04/24 1548 (!) 144/82 -- -- 75 18 98 % -- --   04/04/24 1528 (!) 148/82 -- -- 73 10 -- -- --   04/04/24 1500 (!) 143/83 -- -- 73 17 96 % -- --   04/04/24 1452 (!) 135/91 -- -- 76 16 98 % -- --   04/04/24 1434 (!) 141/82 -- -- 73 -- 98 % -- --   04/04/24 1430 -- -- -- 78 18 97 % -- --   04/04/24 1415 (!) 141/87 -- -- 75 23 99 % -- --   04/04/24 1402 (!) 140/88 -- -- 75 11 99 % -- --   04/04/24 1400 (!) 140/88 -- -- 72 (!) 7 99 % -- --   04/04/24 1352 (!) 141/78 -- -- -- -- -- -- --   04/04/24 1339 (!) 158/84 97  F (36.1  C) Temporal 75 16 98 % 1.727 m (5' 8\") 63 kg (139 lb)        Physical Exam  General: No acute distress  Head: No obvious trauma to head.  Ears, Nose, Throat:  External ears normal.  Nose normal.  No pharyngeal erythema, swelling or exudate.  Midline uvula. Moist mucus membranes.  Eyes:  Conjunctivae clear.   Neck: Normal range of motion.  Neck supple.   CV: Regular rate and rhythm.  No murmurs.      Respiratory: Effort normal and breath sounds normal.  No wheezing or crackles.   Gastrointestinal: Soft.  No distension. There is no tenderness.  There is no rigidity, no rebound and no guarding.   Musculoskeletal: Normal range of motion.  Non tender extremities to palpations. No lower extremity edema  Neuro: Alert. Moving all extremities appropriately.  Normal speech.    Skin: Skin is warm and dry.  No rash noted.   Psych: Normal mood and affect. Behavior is normal.       Emergency Department Course   ECG    ECG results from 04/04/24   EKG 12-lead, tracing only     Value    Systolic Blood Pressure     Diastolic Blood Pressure     Ventricular Rate 70    Atrial Rate     SC Interval  "    QRS Duration 94        QTc 414    P Axis     R AXIS 28    T Axis 55    Interpretation ECG      Atrial fibrillation  When compared with ECG of 09-FEB-2024 12:13,  Nonspecific T wave abnormality no longer evident in Inferior leads           Imaging:  No orders to display          Laboratory:  Labs Ordered and Resulted from Time of ED Arrival to Time of ED Departure   BASIC METABOLIC PANEL - Abnormal       Result Value    Sodium 137      Potassium 5.7 (*)     Chloride 102      Carbon Dioxide (CO2) 19 (*)     Anion Gap 16 (*)     Urea Nitrogen 67.5 (*)     Creatinine 5.46 (*)     GFR Estimate 10 (*)     Calcium 9.4      Glucose 88     CBC WITH PLATELETS AND DIFFERENTIAL - Abnormal    WBC Count 11.5 (*)     RBC Count 3.66 (*)     Hemoglobin 9.6 (*)     Hematocrit 31.7 (*)     MCV 87      MCH 26.2 (*)     MCHC 30.3 (*)     RDW 15.5 (*)     Platelet Count 440      % Neutrophils 84      % Lymphocytes 7      % Monocytes 6      % Eosinophils 0      % Basophils 1      % Immature Granulocytes 2      NRBCs per 100 WBC 0      Absolute Neutrophils 9.7 (*)     Absolute Lymphocytes 0.8      Absolute Monocytes 0.7      Absolute Eosinophils 0.0      Absolute Basophils 0.1      Absolute Immature Granulocytes 0.2      Absolute NRBCs 0.0     GLUCOSE BY METER - Abnormal    GLUCOSE BY METER POCT 170 (*)    GLUCOSE BY METER - Normal    GLUCOSE BY METER POCT 87     GLUCOSE MONITOR NURSING POCT   GLUCOSE MONITOR NURSING POCT   GLUCOSE MONITOR NURSING POCT   URINE MACROSCOPIC WITH REFLEX TO MICRO        Procedures   NA    Emergency Department Course & Assessments:    Interventions:  Medications   glucose gel 15-30 g (has no administration in time range)     Or   dextrose 50 % injection 25-50 mL (has no administration in time range)     Or   glucagon injection 1 mg (has no administration in time range)   dextrose 10% BOLUS 300 mL (300 mLs Intravenous $New Bag 4/4/24 0956)   lidocaine 1 % 0.1-1 mL (has no administration in time range)    lidocaine (LMX4) cream (has no administration in time range)   sodium chloride (PF) 0.9% PF flush 3 mL (has no administration in time range)   sodium chloride (PF) 0.9% PF flush 3 mL (has no administration in time range)   senna-docusate (SENOKOT-S/PERICOLACE) 8.6-50 MG per tablet 1 tablet (has no administration in time range)     Or   senna-docusate (SENOKOT-S/PERICOLACE) 8.6-50 MG per tablet 2 tablet (has no administration in time range)   calcium carbonate (TUMS) chewable tablet 1,000 mg (has no administration in time range)   acetaminophen (TYLENOL) tablet 650 mg (has no administration in time range)     Or   acetaminophen (TYLENOL) Suppository 650 mg (has no administration in time range)   benzocaine-menthol (CHLORASEPTIC) 6-10 MG lozenge 1 lozenge (has no administration in time range)   pantoprazole (PROTONIX) EC tablet 40 mg (has no administration in time range)   sodium chloride 0.9% BOLUS 1,000 mL (1,000 mLs Intravenous $New Bag 4/4/24 1545)   dextrose 50 % injection 25 g (25 g Intravenous $Given 4/4/24 1612)   insulin regular 1 unit/mL injection 6.3 Units (6.3 Units Intravenous $Given 4/4/24 1613)        Assessments:  1420 - Initial evaluation and assessment of patient  1605 - I reevaluated the patient    Independent Interpretation (X-rays, CTs, rhythm strip):  None    Consultations/Discussion of Management or Tests:   1642 - I spoke to Dr. Nava, hospitalist       Social Determinants of Health affecting care:   None    Disposition:  The patient was admitted to the hospital under the care of Dr. Nava.     Impression & Plan    CMS Diagnoses: None      MIPS (If applicable):  N/A    Medical Decision Making:  Patient presents with hyperkalemia.  He denies any other symptoms.  He is hemodynamically stable.  Potassium is 5.7.  Creatinine is 5.46, which is substantially increased from his baseline.  He is given insulin and dextrose, and 1 L normal saline bolus.  EKG shows no peaked T waves or other signs of  hyperkalemia.  However, given the hyperkalemia and the significant MARIPOSA, I recommended the patient that we admit for continued monitoring and treatment.  He is agreeable with this plan.  I discussed the patient with the hospitalist, agreed to admit the patient to their service.  He remains in stable condition and is transferred to the floor.      Diagnosis:    ICD-10-CM    1. MARIPOSA (acute kidney injury) (H24)  N17.9       2. Hyperkalemia  E87.5            Discharge Medications:  Current Discharge Medication List             4/4/2024   Johnson Wilson MD Peery, Stephen, MD  04/04/24 2010

## 2024-04-04 NOTE — H&P
[facility]    History and Physical - Hospitalist Service       Date of Admission:  4/4/2024    Assessment & Plan      Zack Booth is a 84 year old male wit a history of metastatic lung adenocarcinoma, HTN, AF not on anticoagulation, CAD,PAD, admitted on 4/4/2024 after he presented to oncology clinic for infusion, however labs noted a potassium of 6.0and acute creatinine increased to 5.46 from baseline 1.4-1.6.  He was sent to the ED for further evaluation.  He is asymptomatic, he was shifted with insulin and given IV fluids.    MARIPOSA on CKD  Hyperkalemia  Suspect this is medication induced; possible prerenal but he reports he has been eating and drinking okay. It looks like his spironolactone was restarted 3/30 at 100 mg daily  -discontinued spironolactone, took off of med list  -measure urine output q4  -potassium recheck this evening 1900 and 2300  -urinalysis; suspect will be abnormal given MARIPOSA, eval for casts    Metastatic adenocarcinoma of lung, currently on palliative immunotherapy with Keytruda 2/1/2024  S/p palliative XRT L iliac bone lytic lesion 1/2024  Initially diagnosed 12/2023 during admission for fall, with CTAP with lytic lesion in L iliac with soft tissue extension as well as 2 cm lung lesion in RML, RLL 1.4 cm lung nodule. Biopsy of bone with metastatic adenocarcinoma with lung primary.After initial treatment he was admitted for duodenal ulcer, though GIB was unlikely related to keytruda, and given his high PD-L1 it was recommended he pursue treatment with keytruda; presented for infusion today.  -follow up after discharge    Recent GIB due to duodenal ulcer  During admission 2/2024. No further signs of recurrent GIB. He is off of aspirin and is on protonix and was treated with clarithromycin, amoxicillin regimen for H pylori. GIB was unlikely related to keytruda.  -continue ppi  -no nsaids    CAD  ASA on hold 2/2 recent GIB.       Diet: Combination Diet Regular Diet; 3 gm K  Diet (low potassium)  DVT Prophylaxis: Pneumatic Compression Devices  Gordon Catheter: Not present  Lines: None     Cardiac Monitoring: None  Code Status:      Clinically Significant Risk Factors Present on Admission        # Hyperkalemia: Highest K = 5.7 mmol/L in last 2 days, will monitor as appropriate            # Acute Kidney Injury, unspecified: based on a >150% or 0.3 mg/dL increase in last creatinine compared to past 90 day average, will monitor renal function  # Hypertension: Noted on problem list                 Disposition Plan      Expected Discharge Date: 04/06/2024                  Eduarda Nava DO  Hospitalist Service  [facility]  Securely message with [employer] (more info)  Text page via [facility] Paging/Directory     ______________________________________________________________________    Chief Complaint   Abnormal labs    History is obtained from the patient, electronic health record, and emergency department physician    History of Present Illness   Zack Booth is a 84 year old male with a history of metastatic lung adenocarcinoma, HTN, AF not on anticoagulation, CAD,PAD, admitted on 4/4/2024 after he presented to oncology clinic for infusion, however labs noted a potassium of 6.0 and acute creatinine increased to 5.46 from baseline 1.4-1.6.  He was sent to the ED for further evaluation.  He is asymptomatic, he was shifted with insulin and given IV fluids.No fever, chills, chest pain, shortness of breath, nausea, vomiting, abdominal pain, hematuria, dysuria, constipation, diarrhea. He reports he has been eating and drinking appropriately.     Past Medical History    Past Medical History:   Diagnosis Date    Cancer (H)     Chronic kidney disease, stage III (moderate) (H)     3/4/2015    Congenital renal agenesis and dysgenesis     3/4/2015,Hospitalized following over-medication with Atenolol.    Coronary atherosclerosis     3/4/2015    Disturbance of skin sensation     3/4/2015     Essential hypertension     3/4/2015    Other and unspecified hyperlipidemia     3/4/2015    Other symptoms involving nervous and musculoskeletal systems     3/4/2015    Postsurgical percutaneous transluminal coronary angioplasty status     3/4/2015       Past Surgical History   Past Surgical History:   Procedure Laterality Date    APPENDECTOMY OPEN      No Comments Provided    ARTHROPLASTY KNEE      x7 knee surgeries.    ESOPHAGOSCOPY, GASTROSCOPY, DUODENOSCOPY (EGD), COMBINED N/A 2/3/2024    Procedure: Esophagoscopy, gastroscopy, duodenoscopy, combined;  Surgeon: Kavin Duong MD;  Location: [facility] OR    HEART CATH, ANGIOPLASTY      No Comments Provided    OTHER SURGICAL HISTORY      [id number],OTHER    VASECTOMY      No Comments Provided       Prior to Admission Medications   Prior to Admission Medications   Prescriptions Last Dose Informant Patient Reported? Taking?   acetaminophen (TYLENOL) 500 MG tablet 4/4/2024 at am  Yes Yes   Sig: Take 1,000 mg by mouth daily as needed   cyanocobalamin (VITAMIN B-12) 1000 MCG tablet 4/4/2024 at am  Yes Yes   Sig: Take 1,000 mcg by mouth daily   nitroGLYcerin (NITROSTAT) 0.4 MG sublingual tablet prn  Yes Yes   Sig: Place 0.4 mg under the tongue every 5 minutes as needed for chest pain For chest pain place 1 tablet under the tongue every 5 minutes for 3 doses. If symptoms persist 5 minutes after 1st dose call 911.   pantoprazole (PROTONIX) 40 MG EC tablet 4/4/2024 at am  No Yes   Sig: Take 1 tablet (40 mg) by mouth 2 times daily for 90 days   senna-docusate (SENOKOT-S/PERICOLACE) 8.6-50 MG tablet prn  Yes Yes   Sig: Take 1 tablet by mouth 2 times daily as needed   spironolactone (ALDACTONE) 100 MG tablet 4/4/2024 at am  Yes Yes   Sig: Take 100 mg by mouth daily      Facility-Administered Medications: None           Physical Exam   Vital Signs: Temp: 97.3  F (36.3  C) Temp src: Oral BP: (!) 156/72 Pulse: 61   Resp: 20 SpO2: 99 % O2 Device: None (Room air)    Weight: 139 lbs 11.2  oz    General: Very pleasant male , NAD  HEENT:  Sclerae anicteric.  Mucous membranes moist.  Cardiac: Regular rate and rhythm without murmur.  No peripheral edema.  Respiratory: Normal work of breathing.  Clear to auscultation bilaterally without wheezing, rales, or rhonchi.  GI:  Abdomen soft, nontender, nondistended.  Musculoskeletal: Moving all extremities appropriately.  Skin: No rashes or abrasions on exposed skin.  Neurologic: Alert. No gross focal deficits.   Psychologic: Appropriate mood and affect.    Medical Decision Making       70 MINUTES SPENT BY ME on the date of service doing chart review, history, exam, documentation & further activities per the note.      Data     I have personally reviewed the following data over the past 24 hrs:    11.5 (H)  \   9.6 (L)   / 440     137 102 67.5 (H) /  170 (H)   5.7 (H) 19 (L) 5.46 (H) \

## 2024-04-05 NOTE — PROVIDER NOTIFICATION
Second potassium recheck is 5.4. MD ordered Lokelma at midnight. No potassium recheck until 6am per crossover.

## 2024-04-05 NOTE — PROGRESS NOTES
[facility]    Medicine Progress Note - Hospitalist Service    Date of Admission:  4/4/2024    Assessment & Plan      Zack Booth is a 84 year old male wit a history of metastatic lung adenocarcinoma, HTN, AF not on anticoagulation, CAD,PAD, admitted on 4/4/2024 after he presented to oncology clinic for infusion, however labs noted a potassium of 6.0and acute creatinine increased to 5.46 from baseline 1.4-1.6.  He was sent to the ED for further evaluation.  He is asymptomatic, he was shifted with insulin and given IV fluids.    MARIPOSA on CKD  Hyperkalemia  Suspect this is medication induced; possible prerenal but he reports he has been eating and drinking okay. It looks like his spironolactone was restarted 3/30 at 100 mg daily  -discontinued spironolactone, took off of med list  -measure urine output q4  -potassium recheck this evening 1900 and 2300  -urinalysis; suspect will be abnormal given MARIPOSA, eval for casts    Metastatic adenocarcinoma of lung, currently on palliative immunotherapy with Keytruda 2/1/2024  S/p palliative XRT L iliac bone lytic lesion 1/2024  Initially diagnosed 12/2023 during admission for fall, with CTAP with lytic lesion in L iliac with soft tissue extension as well as 2 cm lung lesion in RML, RLL 1.4 cm lung nodule. Biopsy of bone with metastatic adenocarcinoma with lung primary.After initial treatment he was admitted for duodenal ulcer, though GIB was unlikely related to keytruda, and given his high PD-L1 it was recommended he pursue treatment with keytruda; presented for infusion today.  -follow up after discharge    Recent GIB due to duodenal ulcer  During admission 2/2024. No further signs of recurrent GIB. He is off of aspirin and is on protonix and was treated with clarithromycin, amoxicillin regimen for H pylori. GIB was unlikely related to keytruda.  -continue ppi  -no nsaids    CAD  ASA on hold 2/2 recent GIB.          Diet: Combination Diet Regular Diet; 3 gm  K Diet (low potassium)    DVT Prophylaxis: Pneumatic Compression Devices  Gordon Catheter: Not present  Lines: None     Cardiac Monitoring: ACTIVE order. Indication: Electrolyte Imbalance (24 hours)- Magnesium <1.3 mg/ml; Potassium < =2.8 or > 5.5 mg/ml  Code Status: No CPR- Do NOT Intubate      Clinically Significant Risk Factors Present on Admission        # Hyperkalemia: Highest K = 5.7 mmol/L in last 2 days, will monitor as appropriate       # Hypoalbuminemia: Lowest albumin = 3.4 g/dL at 4/5/2024  7:56 AM, will monitor as appropriate    # Acute Kidney Injury, unspecified: based on a >150% or 0.3 mg/dL increase in last creatinine compared to past 90 day average, will monitor renal function  # Hypertension: Noted on problem list                 Disposition Plan     Expected Discharge Date: 04/06/2024                    Martín Tate MD  Hospitalist Service  [facility]  Securely message with [employer] (more info)  Text page via [employer] Paging/Directory   ______________________________________________________________________    Interval History   Patient feels better and he is clinically improved with, no significant symptomatic respect to admission.  However renal function is still in recovery.  I have sent for ultrasound of the kidneys, size and echotexture both are within the normal limits.  No evidence of hydronephrosis.  Incidental findings of simple cyst no need for follow-up.  Prostatomegaly.    Physical Exam   Vital Signs: Temp: 98  F (36.7  C) Temp src: Oral BP: 110/54 Pulse: 78   Resp: 16 SpO2: 95 % O2 Device: None (Room air)    Weight: 139 lbs 11.2 oz    Constitutional: awake, alert, cooperative, no apparent distress, and appears stated age  Respiratory: No increased work of breathing, good air exchange, clear to auscultation bilaterally, no crackles or wheezing  Cardiovascular: Normal apical impulse, regular rate and rhythm, normal S1 and S2, no S3 or S4, and no murmur  noted    Medical Decision Making       36 MINUTES SPENT BY ME on the date of service doing chart review, history, exam, documentation & further activities per the note.      Data     I have personally reviewed the following data over the past 24 hrs:    10.5  \   8.9 (L)   / 373     138 107 63.1 (H) /  107 (H)   5.1 17 (L) 5.70 (H) \     ALT: N/A AST: N/A AP: N/A TBILI: N/A   ALB: 3.4 (L) TOT PROTEIN: N/A LIPASE: N/A       Imaging results reviewed over the past 24 hrs:   Recent Results (from the past 24 hour(s))   US Renal Complete Non-Vascular    Narrative    US RENAL COMPLETE NON-VASCULAR   4/5/2024 2:20 PM     HISTORY: Acute kidney injury    COMPARISON: CT 2/3/2024    FINDINGS:    Right Kidney: Normal size and cortical echogenicity measuring up to  10.4 cm. Multiple simple appearing cysts, no specific follow-up  recommended. No hydronephrosis.    Left Kidney: Normal size and cortical echogenicity measuring up to  10.0 cm. Multiple simple appearing cysts, no specific follow-up  recommended. No hydronephrosis.    Bladder: Prostatomegaly with compression of the bladder base.  Otherwise unremarkable.      Impression    IMPRESSION:    1. No hydronephrosis.  2. Prostatomegaly.    MAGED MEDINA MD         SYSTEM ID:  [id number]

## 2024-04-05 NOTE — PLAN OF CARE
Goal Outcome Evaluation:     Nutrition Prescription    RECOMMENDATIONS FOR MDs/PROVIDERS TO ORDER:  Consider assessing for iron deficiency    Malnutrition Status:    Severe in the context of chronic illness    Recommendations already ordered by Registered Dietitian (RD):  Chocolate Ensure TID w/ meals  Multivitamin w/ cosign as pt is meeting < 75% nutrition needs  100mg thiamine as pt is at risk for refeeding syndrome  Vitamin B12/folate panel, Vitamin B6, copper, zinc labs w/ cosign dt suspected micronutrient deficiencies given malnutrition status and CBC    Future/Additional Recommendations:  Adjust supplements pending PO intake/pt preference

## 2024-04-05 NOTE — PROGRESS NOTES
"CLINICAL NUTRITION SERVICES - ASSESSMENT NOTE     Nutrition Prescription    RECOMMENDATIONS FOR MDs/PROVIDERS TO ORDER:  Consider assessing for iron deficiency    Malnutrition Status:    Severe in the context of chronic illness    Recommendations already ordered by Registered Dietitian (RD):  Chocolate Ensure TID w/ meals  Multivitamin w/ cosign as pt is meeting < 75% nutrition needs  100mg thiamine as pt is at risk for refeeding syndrome  Vitamin B12/folate panel, Vitamin B6, copper, zinc labs w/ cosign dt suspected micronutrient deficiencies given malnutrition status and CBC    Future/Additional Recommendations:  Adjust supplements pending PO intake/pt preference       REASON FOR ASSESSMENT  Zack Booth is a/an 84 year old male assessed by the dietitian for Admission Nutrition Risk Screen for positive    Metastatic lung cancer, MARIPOSA on CKD, recent GIB dt duodenal ulcer, s/p palliative, Rt L iliac    NUTRITION HISTORY  Pt does not follow a particular diet. NKFA. Pt stated having difficulty chewing dt need for caps on his back molars, however, pt stated he could gum it down and doesn't need a modified diet. Pt has been eating about 50% usual intake for the past few months. Pt has unintentionally lost 30lbs with stated UBW 170lbs. Per PTA med list pt taking Vitamin B12. Pt stated he drinks Ensure for breakfast at home and prefers chocolate.     CURRENT NUTRITION ORDERS  Diet: 3 g Potassium  Intake/Tolerance: Pt eating 25% of meals per nursing records and received 1441kcals and 50g protein daily    LABS  Labs reviewed: BG , Un 63.1, Cr 5.70, GFR 9, phos 4.7, alb 3.4, hgb 8.9, hematocrit 30.3, rbc 3.48, MCH 25.6, MCHC 29.4, RDW 15.6    MEDICATIONS  Medications reviewed: D5 bolus, insulin, protonix, lokelema    ANTHROPOMETRICS  Height: 172.7 cm (5' 8\")  Most Recent Weight: 63.4 kg (139 lb 11.2 oz)    IBW: 68.4 kg  BMI: Normal BMI  Weight History:   Wt Readings from Last 30 Encounters:   04/04/24 63.4 kg (139 lb " 11.2 oz)   02/19/2024    65.3 kg (144 lb)                      2.9% wt loss in 1.5 months   02/09/24 65.6 kg (144 lb 10 oz)   02/04/24 68.8 kg (151 lb 10.8 oz)   06/08/2023  81.2 kg (179 lb)                      7.8% wt loss in 10 months   03/04/15 84.7 kg (186 lb 12.8 oz)     Dosing Weight: 63.4 kg    ASSESSED NUTRITION NEEDS  Estimated Energy Needs: 1823 kcals/day ([place] Jeor)  Justification: Stress factor 1.4, Increased needs, and Repletion  Estimated Protein Needs: 76-95 grams protein/day (1.2 - 1.5 grams of pro/kg)  Justification: Maintenance  Estimated Fluid Needs: 2350-9242 mL/day (25 - 30 mL/kg)   Justification: Maintenance    PHYSICAL FINDINGS  See malnutrition section below.  Scattered bruising       MALNUTRITION:  % Weight Loss:  Weight loss does not meet criteria for malnutrition as it is not significant enough  % Intake:  </= 50% for >/= 1 month (severe malnutrition)  Subcutaneous Fat Loss:  Orbital region severe depletion and Upper arm region moderate-severe depletion  Muscle Loss:  Temporal region moderate to severe depletion, Clavicle bone region severe depletion, Acromion bone region severe depletion, Dorsal hand region severe depletion, Patellar region moderate depletion, and Posterior calf region severe depletion  Fluid Retention:  None noted    Malnutrition Diagnosis: Severe malnutrition  In Context of:  Chronic illness or disease    NUTRITION DIAGNOSIS  Malnutrition related to poor appetite as evidenced by severe subcutaneous fat loss, severe muscle loss, and prolonged poor intake of < 50% estimated energy needs for > 1 month      INTERVENTIONS  Implementation  Nutrition Education: Per provider order if indicated   Collaboration with other providers  Medical food supplement therapy  Multivitamin/mineral supplement therapy     Goals  Patient to consume % of nutritionally adequate meals three times per day, or the equivalent with supplements/snacks.  Total avg nutritional intake to meet  a minimum of 1823 kcal/kg and 76 g PRO/kg daily (per dosing wt 63.4 kg).     Monitoring/Evaluation  Progress toward goals will be monitored and evaluated per protocol. PO intake, supplement tolerance, wt, labs

## 2024-04-05 NOTE — PROGRESS NOTES
Potassium is 5.4.  Potassium is 5.7 in the ER and he was shifted with insulin dextrose getting potassium down to 5.1.  Received some IV fluids.  He now received Lasix due to his creatinine of 5.  -Give dose of 10 g Lokelma now

## 2024-04-05 NOTE — PROVIDER NOTIFICATION
"Colin messaged Dr. Nava that pt showed 6 beats of v tach on tele, pt asymptomatic.     MD responded \"Okay.\"  "

## 2024-04-05 NOTE — PLAN OF CARE
"Goal Outcome Evaluation:      Plan of Care Reviewed With: patient, child    Overall Patient Progress: no changeOverall Patient Progress: no change    Outcome Evaluation: Creatitine still elevated this AM.  Renal US pending.  Potential discharge tomorrow.    A&O x 4, VSS. Creatinine: 5.7. Tele: Afib CVR.  Left PIV SL.  Renal US, negative for hydroneprosis.  A1 w/GB & Cane.        Problem: Adult Inpatient Plan of Care  Goal: Plan of Care Review  Description: The Plan of Care Review/Shift note should be completed every shift.  The Outcome Evaluation is a brief statement about your assessment that the patient is improving, declining, or no change.  This information will be displayed automatically on your shift  note.  Outcome: Not Progressing  Flowsheets (Taken 4/5/2024 1646)  Outcome Evaluation: Creatitine still elevated this AM.  Renal US pending.  Potential discharge tomorrow.  Plan of Care Reviewed With:   patient   child  Overall Patient Progress: no change  Goal: Patient-Specific Goal (Individualized)  Description: You can add care plan individualizations to a care plan. Examples of Individualization might be:  \"Parent requests to be called daily at 9am for status\", \"I have a hard time hearing out of my right ear\", or \"Do not touch me to wake me up as it startles  me\".  Outcome: Not Progressing  Goal: Absence of Hospital-Acquired Illness or Injury  Outcome: Not Progressing  Intervention: Identify and Manage Fall Risk  Recent Flowsheet Documentation  Taken 4/5/2024 0900 by Arnulfo Cisneros, RN  Safety Promotion/Fall Prevention:   safety round/check completed   supervised activity  Intervention: Prevent and Manage VTE (Venous Thromboembolism) Risk  Recent Flowsheet Documentation  Taken 4/5/2024 0900 by Arnulfo Cisneros, RN  VTE Prevention/Management: SCDs (sequential compression devices) off  Goal: Optimal Comfort and Wellbeing  Outcome: Not Progressing  Goal: Readiness for Transition of Care  Outcome: Not " Progressing     Problem: Fall Injury Risk  Goal: Absence of Fall and Fall-Related Injury  Outcome: Not Progressing  Intervention: Promote Injury-Free Environment  Recent Flowsheet Documentation  Taken 4/5/2024 0900 by Arnulfo Cisneros RN  Safety Promotion/Fall Prevention:   safety round/check completed   supervised activity     Problem: Comorbidity Management  Goal: Blood Pressure in Desired Range  Outcome: Not Progressing

## 2024-04-05 NOTE — PLAN OF CARE
Goal Outcome Evaluation:      Plan of Care Reviewed With: patient    Overall Patient Progress: no changeOverall Patient Progress: no change    Outcome Evaluation: Potassium check twice. Given lokelma. recheck am. UA sample done.      Problem: Adult Inpatient Plan of Care  Goal: Plan of Care Review  Description: The Plan of Care Review/Shift note should be completed every shift.  The Outcome Evaluation is a brief statement about your assessment that the patient is improving, declining, or no change.  This information will be displayed automatically on your shift  note.  Outcome: Progressing  Flowsheets (Taken 4/5/2024 0341)  Outcome Evaluation: Potassium check twice. Given lokelma. recheck am. UA sample done.  Plan of Care Reviewed With: patient  Overall Patient Progress: no change  Goal: Absence of Hospital-Acquired Illness or Injury  Intervention: Identify and Manage Fall Risk  Recent Flowsheet Documentation  Taken 4/4/2024 2000 by Zoe Alvarenga RN  Safety Promotion/Fall Prevention:   activity supervised   assistive device/personal items within reach   clutter free environment maintained   lighting adjusted   mobility aid in reach   room near nurse's station   room organization consistent   safety round/check completed   supervised activity   toileting scheduled  Intervention: Prevent Skin Injury  Recent Flowsheet Documentation  Taken 4/4/2024 1957 by Zoe Alvarenga, RN  Body Position: position changed independently     Problem: Fall Injury Risk  Goal: Absence of Fall and Fall-Related Injury  Intervention: Identify and Manage Contributors  Recent Flowsheet Documentation  Taken 4/4/2024 2000 by Zoe Alvarenga, RN  Medication Review/Management: medications reviewed  Intervention: Promote Injury-Free Environment  Recent Flowsheet Documentation  Taken 4/4/2024 2000 by Zoe Alvarenga RN  Safety Promotion/Fall Prevention:   activity supervised   assistive device/personal items within reach   clutter free environment maintained   lighting  adjusted   mobility aid in reach   room near nurse's station   room organization consistent   safety round/check completed   supervised activity   toileting scheduled     Problem: Comorbidity Management  Goal: Blood Pressure in Desired Range  Intervention: Maintain Blood Pressure Management  Recent Flowsheet Documentation  Taken 4/4/2024 2000 by Zoe Alvarenga, RN  Medication Review/Management: medications reviewed

## 2024-04-05 NOTE — PLAN OF CARE
"Provided cares for pt from [id number]. Pt a/o x4. Denied pain. Denied SOB or chest pain. Assist of 1 gait belt and cane. Urine sample needed. Tele on. Admission questions done. Plan to monitor labs.     BP (!) 156/72 (BP Location: Right arm, Patient Position: Semi-Donato's, Cuff Size: Adult Regular)   Pulse 61   Temp 97.3  F (36.3  C) (Oral)   Resp 20   Ht 1.727 m (5' 8\")   Wt 63.4 kg (139 lb 11.2 oz)   SpO2 99%   BMI 21.24 kg/m       Problem: Adult Inpatient Plan of Care  Goal: Plan of Care Review  Description: The Plan of Care Review/Shift note should be completed every shift.  The Outcome Evaluation is a brief statement about your assessment that the patient is improving, declining, or no change.  This information will be displayed automatically on your shift  note.  Outcome: Progressing  Goal: Patient-Specific Goal (Individualized)  Description: You can add care plan individualizations to a care plan. Examples of Individualization might be:  \"Parent requests to be called daily at 9am for status\", \"I have a hard time hearing out of my right ear\", or \"Do not touch me to wake me up as it startles  me\".  Outcome: Progressing  Goal: Absence of Hospital-Acquired Illness or Injury  Outcome: Progressing  Goal: Optimal Comfort and Wellbeing  Outcome: Progressing  Goal: Readiness for Transition of Care  Outcome: Progressing  Intervention: Mutually Develop Transition Plan  Recent Flowsheet Documentation  Taken 4/4/2024 1759 by Celine Salazar, RN  Equipment Currently Used at Home:   cane, straight   grab bar, toilet   grab bar, tub/shower                           "

## 2024-04-06 NOTE — PROVIDER NOTIFICATION
Paged crosscover regarding pts most recent potassium level.     2145: FYI pt potassium level is at 5.8. This morning it was 5.1. Additionally, creatinine has increased and GFR has decreased since this morning.     2154: additionally, tele just called that pt had 5 beats of v-tach just now.

## 2024-04-06 NOTE — PLAN OF CARE
"Goal Outcome Evaluation:      Plan of Care Reviewed With: patient    Overall Patient Progress: no changeOverall Patient Progress: no change    Outcome Evaluation: BP: 135/67. Creatine continues to rise. GFR decreased. Potassium was elevated during shift. One dose of lokelma was given. Potassium now at 5.3.    A&O x4. Assist of 1, with cane. On tele, in afib CVR. L. PIV SL. Renal US completed. See results for details. On Mg protocol, Am recheck.     Problem: Adult Inpatient Plan of Care  Goal: Plan of Care Review  Description: The Plan of Care Review/Shift note should be completed every shift.  The Outcome Evaluation is a brief statement about your assessment that the patient is improving, declining, or no change.  This information will be displayed automatically on your shift  note.  Outcome: Progressing  Flowsheets (Taken 4/6/2024 0522)  Outcome Evaluation: BP: 135/67. Creatine continues to rise. GFR decreased. Potassium was elevated during shift. One dose of lokelma was given. Potassium now at 5.3.  Plan of Care Reviewed With: patient  Overall Patient Progress: no change  Goal: Patient-Specific Goal (Individualized)  Description: You can add care plan individualizations to a care plan. Examples of Individualization might be:  \"Parent requests to be called daily at 9am for status\", \"I have a hard time hearing out of my right ear\", or \"Do not touch me to wake me up as it startles  me\".  Outcome: Progressing  Goal: Absence of Hospital-Acquired Illness or Injury  Outcome: Progressing  Intervention: Identify and Manage Fall Risk  Recent Flowsheet Documentation  Taken 4/6/2024 0441 by Lamar Lerma, FLORESITA  Safety Promotion/Fall Prevention: safety round/check completed  Taken 4/5/2024 2041 by Lamar Lerma, RN  Safety Promotion/Fall Prevention:   safety round/check completed   activity supervised  Intervention: Prevent Skin Injury  Recent Flowsheet Documentation  Taken 4/5/2024 2201 by Lamar Lerma, RN  Body Position: " position changed independently  Taken 4/5/2024 2041 by Lamar Lerma RN  Body Position: position changed independently  Goal: Optimal Comfort and Wellbeing  Outcome: Progressing  Goal: Readiness for Transition of Care  Outcome: Progressing     Problem: Fall Injury Risk  Goal: Absence of Fall and Fall-Related Injury  Outcome: Progressing  Intervention: Identify and Manage Contributors  Recent Flowsheet Documentation  Taken 4/5/2024 2041 by Lamar Lerma RN  Medication Review/Management: medications reviewed  Intervention: Promote Injury-Free Environment  Recent Flowsheet Documentation  Taken 4/6/2024 0441 by Lamar Lerma RN  Safety Promotion/Fall Prevention: safety round/check completed  Taken 4/5/2024 2041 by Lamar Lerma RN  Safety Promotion/Fall Prevention:   safety round/check completed   activity supervised     Problem: Comorbidity Management  Goal: Blood Pressure in Desired Range  Outcome: Progressing  Intervention: Maintain Blood Pressure Management  Recent Flowsheet Documentation  Taken 4/5/2024 2041 by Lamar Lerma RN  Medication Review/Management: medications reviewed     Problem: Malnutrition  Goal: Improved Nutritional Intake  Outcome: Progressing

## 2024-04-06 NOTE — CONSULTS
Nephrology Consultation      Zack Booth MRN# [id number]   YOB: 1940 Age: 84 year old   Date of Admission: 4/4/2024     Reason for consult: I was asked by Dr. Tate to evaulate this patient for MARIPOSA. .           Assessment and Plan:   MARIPOSA: uncertain etiology. No evidence of obstruction.  ? Effect of Keytruda. May be pre-renal.   Hyperkalemia associated with [place]. CT angio was done on 2/3/2024 which showed patent renal arteries with no hydronephrosis or.  There was BPH.  Renal ultrasound was done on 4/5/2024 which showed normal-sized kidneys with no hydronephrosis and an enlarged prostate..  His baseline creatinine is 1.44 consistent with chronic kidney disease stage IIIa.    Avoid nephrotoxins.  IVF with bicarb.  Follow labs.   Not a dialysis candidate.             Chief Complaint:   MARIPOSA    History is obtained from the patient and electronic health record         History of Present Illness:   This patient is a 84 year old male who presents with MARIPOSA.  He is an 83-year-old who presented on 4/4/2024 with MARIPOSA and hyperkalemia.  He was on spironolactone.  On 2/3/2024 CT angiogram was done with contrast.  This showed patent renal arteries no hydronephrosis and BPH.  On 4/5/2024 renal ultrasound was done which showed normal-sized kidneys and no hydronephrosis.  In 2/24 his creatinine was 1.44 estimated GFR 48.  On admission/4/24 his creatinine was 5.48 potassium 5.7 bicarbonate 19.  On 4/6/2024 his creatinine was up to 6.54, bicarbonate 16, potassium 4.8.    He has a past medical history of metastatic lung cancer and has been on Keytruda and radiation therapy.  He has a history of anemia.  On admission his hemoglobin was 8.9.  He has hypertension, atrial fibrillation and a recent GI bleed due to duodenal ulcer.    In the hospital he was treated with shifting with D10, D50 and insulin.  He was given 2 doses of Lokelma.  He was given normal saline IV.  His spironolactone was stopped.    He currently feels  well and is eager to go home.  He feels like he has a normal urine output..  He has no prior history of kidney disease according to the patient and has never seen a nephrologist in the past.  He was not taking any nonsteroidal anti-inflammatory drugs.  He tells me he has been on spironolactone long-term for uncertain reasons.                Past Medical History:     Past Medical History:   Diagnosis Date    Cancer (H)     Chronic kidney disease, stage III (moderate) (H)     3/4/2015    Congenital renal agenesis and dysgenesis     3/4/2015,Hospitalized following over-medication with Atenolol.    Coronary atherosclerosis     3/4/2015    Disturbance of skin sensation     3/4/2015    Essential hypertension     3/4/2015    Other and unspecified hyperlipidemia     3/4/2015    Other symptoms involving nervous and musculoskeletal systems     3/4/2015    Postsurgical percutaneous transluminal coronary angioplasty status     3/4/2015             Past Surgical History:     Past Surgical History:   Procedure Laterality Date    APPENDECTOMY OPEN      No Comments Provided    ARTHROPLASTY KNEE      x7 knee surgeries.    ESOPHAGOSCOPY, GASTROSCOPY, DUODENOSCOPY (EGD), COMBINED N/A 2/3/2024    Procedure: Esophagoscopy, gastroscopy, duodenoscopy, combined;  Surgeon: Kavin Duong MD;  Location: [facility] OR    HEART CATH, ANGIOPLASTY      No Comments Provided    OTHER SURGICAL HISTORY      [id number],OTHER    VASECTOMY      No Comments Provided               Social History:     Social History     Tobacco Use    Smoking status: Every Day     Packs/day: 0.25     Years: 70.00     Additional pack years: 0.00     Total pack years: 17.50     Types: Cigarettes    Smokeless tobacco: Never    Tobacco comments:     Quit smoking: -- started at about age 5 years old   Substance Use Topics    Alcohol use: No     Comment: Alcoholic Drinks/day: maybe 1 glass of wine at holiday.             Family History:     Family History   Problem Relation Age of Onset     Genetic Disorder Other         Genetic,No FHx of DM             Allergies:     Allergies   Allergen Reactions    Atenolol      Other reaction(s): Bradycardia    Lisinopril      Other reaction(s): *Unknown    Statins Muscle Pain (Myalgia)     Bad dreams    Ibuprofen      Other reaction(s): Headache             Medications:     Current Facility-Administered Medications   Medication Dose Route Frequency Provider Last Rate Last Admin    multivitamin w/minerals (THERA-VIT-M) tablet 1 tablet  1 tablet Oral Daily Martín Tate MD   1 tablet at 24 0957    pantoprazole (PROTONIX) EC tablet 40 mg  40 mg Oral BID AC Eduarda Nava, DO   40 mg at 24 0635    sodium chloride (PF) 0.9% PF flush 3 mL  3 mL Intracatheter Q8H Eduarda Nava, DO   3 mL at 24    sodium zirconium cyclosilicate (LOKELMA) packet 10 g  10 g Oral Daily Timoteo Cabrales MD        thiamine (B-1) tablet 100 mg  100 mg Oral Daily Martín Tate MD   100 mg at 24 0957             Review of Systems:     Negative except as noted above.            Physical Exam:   Vitals were reviewed  Temp: 99  F (37.2  C) Temp src: Oral BP: 114/52 Pulse: 83   Resp: 16 SpO2: 94 % O2 Device: None (Room air)    Blood pressure range: Systolic (24hrs), Av , Min:114 , Max:140     Blood pressure range: Diastolic (24hrs), Av, Min:52, Max:75      Intake/Output Summary (Last 24 hours) at 2024 1134  Last data filed at 2024 1000  Gross per 24 hour   Intake --   Output 250 ml   Net -250 ml     He is alert and responsive resting comfortably in the chair  Head shows no trauma  Pupils round react to light  No adenopathy on the neck  Lungs show clear breath sounds  Cardiac exam regular rhythm normal S1-S2 no murmur, no jugular venous distention  Lower extremities show no edema and diminished pulses in the feet.  His hands show no clubbing.         Data:   All laboratory data reviewed  Results for orders placed or performed during  the hospital encounter of 04/04/24 (from the past 24 hour(s))   US Renal Complete Non-Vascular    Narrative    US RENAL COMPLETE NON-VASCULAR   4/5/2024 2:20 PM     HISTORY: Acute kidney injury    COMPARISON: CT 2/3/2024    FINDINGS:    Right Kidney: Normal size and cortical echogenicity measuring up to  10.4 cm. Multiple simple appearing cysts, no specific follow-up  recommended. No hydronephrosis.    Left Kidney: Normal size and cortical echogenicity measuring up to  10.0 cm. Multiple simple appearing cysts, no specific follow-up  recommended. No hydronephrosis.    Bladder: Prostatomegaly with compression of the bladder base.  Otherwise unremarkable.      Impression    IMPRESSION:    1. No hydronephrosis.  2. Prostatomegaly.    MAGED MEDINA MD         SYSTEM ID:  [id number]   Fractional excretion of sodium    Narrative    The following orders were created for panel order Fractional excretion of sodium.  Procedure                               Abnormality         Status                     ---------                               -----------         ------                     Fractional Excretion of ...[[id number]]                      Final result                 Please view results for these tests on the individual orders.   Fractional Excretion of Sodium   Result Value Ref Range    Creatinine Urine mg/dL 43.8 mg/dL    Sodium Urine mmol/L 65 mmol/L    %FENA 6.1 %   Creatinine   Result Value Ref Range    Creatinine 6.12 (H) 0.67 - 1.17 mg/dL    GFR Estimate 8 (L) >60 mL/min/1.73m2   Sodium   Result Value Ref Range    Sodium 136 135 - 145 mmol/L   Potassium   Result Value Ref Range    Potassium 5.8 (H) 3.4 - 5.3 mmol/L   Potassium   Result Value Ref Range    Potassium 5.3 3.4 - 5.3 mmol/L   Magnesium   Result Value Ref Range    Magnesium 2.3 1.7 - 2.3 mg/dL   Phosphorus   Result Value Ref Range    Phosphorus 5.7 (H) 2.5 - 4.5 mg/dL   Potassium   Result Value Ref Range    Potassium 4.8 3.4 - 5.3 mmol/L   Basic  metabolic panel   Result Value Ref Range    Sodium 142 135 - 145 mmol/L    Potassium 4.8 3.4 - 5.3 mmol/L    Chloride 107 98 - 107 mmol/L    Carbon Dioxide (CO2) 16 (L) 22 - 29 mmol/L    Anion Gap 19 (H) 7 - 15 mmol/L    Urea Nitrogen 66.4 (H) 8.0 - 23.0 mg/dL    Creatinine 6.54 (H) 0.67 - 1.17 mg/dL    GFR Estimate 8 (L) >60 mL/min/1.73m2    Calcium 9.2 8.8 - 10.2 mg/dL    Glucose 101 (H) 70 - 99 mg/dL

## 2024-04-06 NOTE — PROVIDER NOTIFICATION
Renzo crosscover regarding lab phone call    1948: lab just called regarding a urine specimen collection that was collected around 1430. They said that a creatine and sodium also need to be collected within 6 hrs in order for the full sodium urinalysis test to be completed. Any chance you could put in a stat creatine and sodium so it is within the 6 hr time window of the urine collection? Thanks!    Dr. Farris acknowledged and placed orders.

## 2024-04-06 NOTE — PROVIDER NOTIFICATION
Paged crosscover regarding K: 5.8    Dr. Hendricks acknowledged and will follow up with most recent lab result.     1211: paged regarding updated potassium: 5.3

## 2024-04-06 NOTE — PROGRESS NOTES
[facility]    Medicine Progress Note - Hospitalist Service    Date of Admission:  4/4/2024    Assessment & Plan      Zack Booth is a 84 year old male wit a history of metastatic lung adenocarcinoma, HTN, AF not on anticoagulation, CAD,PAD, admitted on 4/4/2024 after he presented to oncology clinic for infusion, however labs noted a potassium of 6.0and acute creatinine increased to 5.46 from baseline 1.4-1.6.  He was sent to the ED for further evaluation.  He is asymptomatic, he was shifted with insulin and given IV fluids.    MARIPOSA on CKD  Hyperkalemia  Suspect this is medication induced; possible prerenal but he reports he has been eating and drinking okay. It looks like his spironolactone was restarted 3/30 at 100 mg daily  -discontinued spironolactone, took off of med list  -measure urine output q4  -potassium recheck this evening 1900 and 2300  -urinalysis; suspect will be abnormal given MARIPOSA, eval for casts    Metastatic adenocarcinoma of lung, currently on palliative immunotherapy with Keytruda 2/1/2024  S/p palliative XRT L iliac bone lytic lesion 1/2024  Initially diagnosed 12/2023 during admission for fall, with CTAP with lytic lesion in L iliac with soft tissue extension as well as 2 cm lung lesion in RML, RLL 1.4 cm lung nodule. Biopsy of bone with metastatic adenocarcinoma with lung primary.After initial treatment he was admitted for duodenal ulcer, though GIB was unlikely related to keytruda, and given his high PD-L1 it was recommended he pursue treatment with keytruda; presented for infusion today.  -follow up after discharge    Recent GIB due to duodenal ulcer  During admission 2/2024. No further signs of recurrent GIB. He is off of aspirin and is on protonix and was treated with clarithromycin, amoxicillin regimen for H pylori. GIB was unlikely related to keytruda.  -continue ppi  -no nsaids    CAD  ASA on hold 2/2 recent GIB.          Diet: Combination Diet Regular Diet; 3 gm  K Diet (low potassium)  Snacks/Supplements Adult: Ensure Enlive; With Meals    DVT Prophylaxis: Pneumatic Compression Devices  Gordon Catheter: Not present  Lines: None     Cardiac Monitoring: ACTIVE order. Indication: Electrolyte Imbalance (24 hours)- Magnesium <1.3 mg/ml; Potassium < =2.8 or > 5.5 mg/ml  Code Status: No CPR- Do NOT Intubate      Clinically Significant Risk Factors        # Hyperkalemia: Highest K = 5.8 mmol/L in last 2 days, will monitor as appropriate      # Anion Gap Metabolic Acidosis: Highest Anion Gap = 19 mmol/L in last 2 days, will monitor and treat as appropriate  # Hypoalbuminemia: Lowest albumin = 3.4 g/dL at 4/5/2024  7:56 AM, will monitor as appropriate      # Acute Kidney Injury, unspecified: based on a >150% or 0.3 mg/dL increase in last creatinine compared to past 90 day average, will monitor renal function  # Hypertension: Noted on problem list         # Severe Malnutrition: based on nutrition assessment, PRESENT ON ADMISSION          Disposition Plan     Expected Discharge Date: 04/06/2024                    Martín Tate MD  Hospitalist Service  [facility]  Securely message with [employer] (more info)  Text page via [employer] Paging/Directory   ______________________________________________________________________    Interval History   Patient feels better and he is clinically improved with chemistry associated concerning for the poor kidney function.  I have requested nephrology involvement.  U ltrasound of the kidneys shows size and echotexture both are within the normal limits.  No evidence of hydronephrosis.  Incidental findings of simple cyst no need for follow-up.  Prostatomegaly.    Physical Exam   Vital Signs: Temp: 99  F (37.2  C) Temp src: Oral BP: 114/52 Pulse: 83   Resp: 16 SpO2: 94 % O2 Device: None (Room air)    Weight: 139 lbs 11.2 oz    Constitutional: awake, alert, cooperative, no apparent distress, and appears stated age  Respiratory: No  increased work of breathing, good air exchange, clear to auscultation bilaterally, no crackles or wheezing  Cardiovascular: Normal apical impulse, regular rate and rhythm, normal S1 and S2, no S3 or S4, and no murmur noted    Medical Decision Making       36 MINUTES SPENT BY ME on the date of service doing chart review, history, exam, documentation & further activities per the note.      Data     I have personally reviewed the following data over the past 24 hrs:    N/A  \   N/A   / N/A     142 107 66.4 (H) /  101 (H)   4.8; 4.8 16 (L) 6.54 (H) \       Imaging results reviewed over the past 24 hrs:   No results found for this or any previous visit (from the past 24 hour(s)).

## 2024-04-06 NOTE — PROGRESS NOTES
Paged crosscover regarding tele notifying of beats of v-tach.     0257: FYI - tele notified that pt just had 10 beats of v-tach. Pt is asymptomatic and was sleeping.     Dr. Hendricks acknowledged.

## 2024-04-07 NOTE — PLAN OF CARE
"Goal Outcome Evaluation:      Plan of Care Reviewed With: patient    Overall Patient Progress: no changeOverall Patient Progress: no change    Outcome Evaluation: Creatinine elevated again overnight.  Patient put on IVF with Sodium Bicarb and given Lokelma.    VSS. Alert & Oriented x4. A1 w/Cane. Nephrology consulted.  Loose BM's and Emesis, put onto Enteric Precautions.    Problem: Diarrhea  Goal: Effective Diarrhea Management  Outcome: Not Progressing     Problem: Adult Inpatient Plan of Care  Goal: Plan of Care Review  Description: The Plan of Care Review/Shift note should be completed every shift.  The Outcome Evaluation is a brief statement about your assessment that the patient is improving, declining, or no change.  This information will be displayed automatically on your shift  note.  Outcome: Progressing  Flowsheets (Taken 4/6/2024 1950)  Outcome Evaluation: Creatinine elevated again overnight.  Patient put on IVF with Sodium Bicarb and given Lokelma.  Plan of Care Reviewed With: patient  Overall Patient Progress: no change  Goal: Patient-Specific Goal (Individualized)  Description: You can add care plan individualizations to a care plan. Examples of Individualization might be:  \"Parent requests to be called daily at 9am for status\", \"I have a hard time hearing out of my right ear\", or \"Do not touch me to wake me up as it startles  me\".  Outcome: Progressing  Goal: Absence of Hospital-Acquired Illness or Injury  Outcome: Progressing  Goal: Optimal Comfort and Wellbeing  Outcome: Progressing  Goal: Readiness for Transition of Care  Outcome: Progressing     Problem: Fall Injury Risk  Goal: Absence of Fall and Fall-Related Injury  Outcome: Progressing     Problem: Comorbidity Management  Goal: Blood Pressure in Desired Range  Outcome: Progressing     Problem: Malnutrition  Goal: Improved Nutritional Intake  Outcome: Progressing     "

## 2024-04-07 NOTE — PROGRESS NOTES
Pt is alert and oriented, assisted by one with walker. Having loose stool, specimen send to lab for C.diff test.  Return result ; positive for Norovirus. Poor appetite. On Tele. Afib with CVR. Infusing half NS with sodium bicarbonate at 100cc/hr. Denies pain. vss

## 2024-04-07 NOTE — PROGRESS NOTES
Assessment and Plan:     MARIPOSA: Uncertain etiology.  Hyperkalemia associated with spironolactone.  Baseline creatinine approximately 1.44 consistent with chronic kidney disease stage IIIa.    Labs show slight improvement in creatinine: 6.54 > 6.21.  Bicarb is improved 16 > 19.  Potassium is 4.8.  Urine output recorded at 450 mL yesterday.    He is on IV fluids with bicarbonate and on oral Lokelma.    Recheck laboratories in the morning.  If they continue to improve he can be discharged home with follow-up labs done by the end of the week.                Interval History:   Lung cancer: Metastatic.  Treatment with Keytruda and radiation therapy.  Hypertension atrial fibrillation   Recent GI bleed due to duodenal ulcer                     Review of Systems:   He feels well.  He is having some diarrhea and is now on iso.  He is not eating much and complains of anorexia.  He denies pain or shortness of breath.          Medications:     Current Facility-Administered Medications   Medication Dose Route Frequency Provider Last Rate Last Admin    multivitamin w/minerals (THERA-VIT-M) tablet 1 tablet  1 tablet Oral Daily Martín Tate MD   1 tablet at 04/07/24 0910    pantoprazole (PROTONIX) EC tablet 40 mg  40 mg Oral BID AC Eduarda Nava, DO   40 mg at 04/07/24 0830    sodium chloride (PF) 0.9% PF flush 3 mL  3 mL Intracatheter Q8H Eduarda Nava DO   3 mL at 04/06/24 1339    sodium zirconium cyclosilicate (LOKELMA) packet 10 g  10 g Oral Daily Timoteo Cabrales MD   10 g at 04/07/24 0909    thiamine (B-1) tablet 100 mg  100 mg Oral Daily Martín Tate MD   100 mg at 04/07/24 0910     Current Facility-Administered Medications   Medication Dose Route Frequency Provider Last Rate Last Admin    dextrose 5% and 0.45% NaCl 1,000 mL with sodium bicarbonate 75 mEq/L infusion   Intravenous Continuous Timoteo Cabrales  mL/hr at 04/07/24 0917 New Bag at 04/07/24 0917     Current active  medications and PTA medications reviewed, see medication list for details.            Physical Exam:   Vitals were reviewed  Patient Vitals for the past 24 hrs:   BP Temp Temp src Pulse Resp SpO2 Weight   24 0742 124/65 98.1  F (36.7  C) Oral 85 24 94 % --   24 0452 -- -- -- -- -- -- 62.6 kg (138 lb 1.6 oz)   24 0047 -- -- -- -- -- 92 % --   24 2331 132/75 98  F (36.7  C) Oral 103 18 94 % --   24 1841 127/66 97.4  F (36.3  C) Oral 91 18 95 % --   24 1610 133/75 98.1  F (36.7  C) Oral 78 16 94 % --       Temp:  [97.4  F (36.3  C)-98.1  F (36.7  C)] 98.1  F (36.7  C)  Pulse:  [] 85  Resp:  [16-24] 24  BP: (124-133)/(65-75) 124/65  SpO2:  [92 %-95 %] 94 %    Temperatures:  Current - Temp: 98.1  F (36.7  C); Max - Temp  Av.9  F (36.6  C)  Min: 97.4  F (36.3  C)  Max: 98.1  F (36.7  C)  Respiration range: Resp  Av  Min: 16  Max: 24  Pulse range: Pulse  Av.3  Min: 78  Max: 103  Blood pressure range: Systolic (24hrs), Av , Min:124 , Max:133   ; Diastolic (24hrs), Av, Min:65, Max:75    Pulse oximetry range: SpO2  Av.8 %  Min: 92 %  Max: 95 %    I/O last 3 completed shifts:  In: 800 [P.O.:800]  Out: 380 [Urine:330; Emesis/NG output:50]      Intake/Output Summary (Last 24 hours) at 2024 0940  Last data filed at 2024 0021  Gross per 24 hour   Intake 800 ml   Output 380 ml   Net 420 ml       Alert and responsive  Sitting comfortably in the chair  IV fluids infusing       Wt Readings from Last 4 Encounters:   24 62.6 kg (138 lb 1.6 oz)   24 65.6 kg (144 lb 10 oz)   24 68.8 kg (151 lb 10.8 oz)   03/04/15 84.7 kg (186 lb 12.8 oz)          Data:          Lab Results   Component Value Date     2024     2024     2024     2015    Lab Results   Component Value Date    CHLORIDE 106 2024    CHLORIDE 107 2024    CHLORIDE 107 2024    CHLORIDE 102 2015    Lab Results    Component Value Date    BUN 71.0 04/07/2024    BUN 66.4 04/06/2024    BUN 63.1 04/05/2024    BUN 22 03/04/2015      Lab Results   Component Value Date    POTASSIUM 4.8 04/07/2024    POTASSIUM 4.8 04/06/2024    POTASSIUM 4.8 04/06/2024    POTASSIUM 4.3 03/04/2015    Lab Results   Component Value Date    CO2 19 04/07/2024    CO2 16 04/06/2024    CO2 17 04/05/2024    CO2 31 03/04/2015    Lab Results   Component Value Date    CR 6.21 04/07/2024    CR 6.54 04/06/2024    CR 6.12 04/05/2024    CR 1.29 03/04/2015        Recent Labs   Lab Test 04/07/24  0721 04/05/24  0756 04/04/24  1424   WBC 16.9* 10.5 11.5*   HGB 8.4* 8.9* 9.6*   HCT 28.0* 30.3* 31.7*   MCV 86 87 87    373 440     Recent Labs   Lab Test 02/09/24  1219 02/03/24  0911   AST 10 13   ALT 6 13   ALKPHOS 76 73   BILITOTAL 0.3 <0.2       Recent Labs   Lab Test 04/07/24  0721 04/06/24  0803 04/04/24  2257   MAG 2.0 2.3 2.2     Recent Labs   Lab Test 04/07/24  0721 04/06/24  0803 04/05/24  0756   PHOS 5.7* 5.7* 4.7*     Recent Labs   Lab Test 04/07/24  0721 04/06/24  0803 04/05/24  0756   KAUR 8.4* 9.2 9.1       Lab Results   Component Value Date    KAUR 8.4 (L) 04/07/2024     Lab Results   Component Value Date    WBC 16.9 (H) 04/07/2024    HGB 8.4 (L) 04/07/2024    HCT 28.0 (L) 04/07/2024    MCV 86 04/07/2024     04/07/2024     Lab Results   Component Value Date     04/07/2024    POTASSIUM 4.8 04/07/2024    CHLORIDE 106 04/07/2024    CO2 19 (L) 04/07/2024     (H) 04/07/2024     Lab Results   Component Value Date    BUN 71.0 (H) 04/07/2024    CR 6.21 (H) 04/07/2024     Lab Results   Component Value Date    MAG 2.0 04/07/2024     Lab Results   Component Value Date    PHOS 5.7 (H) 04/07/2024       Creatinine   Date Value Ref Range Status   04/07/2024 6.21 (H) 0.67 - 1.17 mg/dL Final   04/06/2024 6.54 (H) 0.67 - 1.17 mg/dL Final   04/05/2024 6.12 (H) 0.67 - 1.17 mg/dL Final   04/05/2024 5.70 (H) 0.67 - 1.17 mg/dL Final   04/04/2024 5.46 (H)  0.67 - 1.17 mg/dL Final   02/10/2024 1.44 (H) 0.67 - 1.17 mg/dL Final   03/04/2015 1.29 0.70 - 1.30 mg/dL        Attestation:  I have reviewed today's vital signs, notes, medications, labs and imaging.     Timoteo Cabrales MD

## 2024-04-07 NOTE — PLAN OF CARE
END OF SHIFT SUMMARY     2300 - 0700    Pt alert and oriented x 4. On RA. Denies pain. IVF infusing. Up with 1 assist, cane and gait belt. Voiding adequately, had x 3 loose Bms this shift, incontinent of stool.     Goal Outcome Evaluation:  Problem: Adult Inpatient Plan of Care  Goal: Absence of Hospital-Acquired Illness or Injury  Intervention: Identify and Manage Fall Risk  Recent Flowsheet Documentation  Taken 4/7/2024 0130 by Moni Avila, RN  Safety Promotion/Fall Prevention:   activity supervised   assistive device/personal items within reach   safety round/check completed   room organization consistent   clutter free environment maintained  Intervention: Prevent Skin Injury  Recent Flowsheet Documentation  Taken 4/7/2024 0130 by Moni Avila, RN  Body Position: position changed independently  Device Skin Pressure Protection: tubing/devices free from skin contact  Taken 4/7/2024 0020 by Moni Avila, RN  Body Position: position changed independently  Intervention: Prevent and Manage VTE (Venous Thromboembolism) Risk  Recent Flowsheet Documentation  Taken 4/7/2024 0130 by Moni Avila, FLORESITA  VTE Prevention/Management: SCDs (sequential compression devices) off  Intervention: Prevent Infection  Recent Flowsheet Documentation  Taken 4/7/2024 0130 by Moni Avila, RN  Infection Prevention:   personal protective equipment utilized   hand hygiene promoted   rest/sleep promoted   single patient room provided   equipment surfaces disinfected     Problem: Fall Injury Risk  Goal: Absence of Fall and Fall-Related Injury  Intervention: Identify and Manage Contributors  Recent Flowsheet Documentation  Taken 4/7/2024 0130 by Moni Avila, RN  Medication Review/Management: medications reviewed  Intervention: Promote Injury-Free Environment  Recent Flowsheet Documentation  Taken 4/7/2024 0130 by Moni Avila, RN  Safety Promotion/Fall Prevention:   activity  supervised   assistive device/personal items within reach   safety round/check completed   room organization consistent   clutter free environment maintained     Problem: Comorbidity Management  Goal: Blood Pressure in Desired Range  Intervention: Maintain Blood Pressure Management  Recent Flowsheet Documentation  Taken 4/7/2024 0130 by Moni Avila, RN  Medication Review/Management: medications reviewed     Problem: Diarrhea  Goal: Effective Diarrhea Management  Intervention: Manage Diarrhea  Recent Flowsheet Documentation  Taken 4/7/2024 0130 by Moni Avila, RN  Isolation Precautions:   enteric precautions maintained   enteric precautions initiated  Medication Review/Management: medications reviewed  Taken 4/7/2024 0020 by Moni Avila, RN  Perineal Care: perineal hygiene encouraged

## 2024-04-07 NOTE — PLAN OF CARE
"Goal Outcome Evaluation:           Overall Patient Progress: no changeOverall Patient Progress: no change    Outcome Evaluation: Gave IV Zofran for nausea, emesis, watery loose stool. Afebrile, rest sleep promoted.    /75   Pulse 103   Temp 98  F (36.7  C) (Oral)   Resp 18   Ht 1.727 m (5' 8\")   Wt 63.4 kg (139 lb 11.2 oz)   SpO2 94%   BMI 21.24 kg/m      General: Pt alert and decision making.  Neuro - AxOx4  Cardiac - WDL; Tele A-fib CVR  Resp - Clear equal diminished RA no cough  GI - x. Nausea vomiting, no additional emesis after IV zofran given   - Up to bathroom Ax1 cane, assist of IV, I/Os with urinal, urine sample needs to be collected  Skin - x. Left old IV site covered with mepilex  IV site clean dry intact, infusing 100 ml/hr Sod. Bicarb D5 NS  Plan - enteric prec for emesis / diarrhea, urine sample, monitoring labs      Problem: Adult Inpatient Plan of Care  Goal: Plan of Care Review  Description: The Plan of Care Review/Shift note should be completed every shift.  The Outcome Evaluation is a brief statement about your assessment that the patient is improving, declining, or no change.  This information will be displayed automatically on your shift  note.  Outcome: Progressing  Flowsheets (Taken 4/6/2024 8885)  Outcome Evaluation: Gave IV Zofran for nausea, emesis, watery loose stool. Afebrile, rest sleep promoted.  Overall Patient Progress: no change  Goal: Patient-Specific Goal (Individualized)  Description: You can add care plan individualizations to a care plan. Examples of Individualization might be:  \"Parent requests to be called daily at 9am for status\", \"I have a hard time hearing out of my right ear\", or \"Do not touch me to wake me up as it startles  me\".  Outcome: Progressing  Goal: Absence of Hospital-Acquired Illness or Injury  Outcome: Progressing  Intervention: Identify and Manage Fall Risk  Recent Flowsheet Documentation  Taken 4/6/2024 2216 by Priscilla Levy RN  Safety " Promotion/Fall Prevention: safety round/check completed  Taken 4/6/2024 1949 by Priscilla Levy RN  Safety Promotion/Fall Prevention:   activity supervised   assistive device/personal items within reach   supervised activity   safety round/check completed   room organization consistent   clutter free environment maintained  Intervention: Prevent Infection  Recent Flowsheet Documentation  Taken 4/6/2024 2216 by Priscilla Levy RN  Infection Prevention:   rest/sleep promoted   single patient room provided  Taken 4/6/2024 1949 by Priscilla Levy RN  Infection Prevention:   rest/sleep promoted   single patient room provided  Goal: Optimal Comfort and Wellbeing  Outcome: Progressing  Goal: Readiness for Transition of Care  Outcome: Progressing     Problem: Fall Injury Risk  Goal: Absence of Fall and Fall-Related Injury  Outcome: Progressing  Intervention: Identify and Manage Contributors  Recent Flowsheet Documentation  Taken 4/6/2024 1949 by Priscilla Levy RN  Medication Review/Management: medications reviewed  Intervention: Promote Injury-Free Environment  Recent Flowsheet Documentation  Taken 4/6/2024 2216 by Priscilla Levy RN  Safety Promotion/Fall Prevention: safety round/check completed  Taken 4/6/2024 1949 by Priscilla Levy RN  Safety Promotion/Fall Prevention:   activity supervised   assistive device/personal items within reach   supervised activity   safety round/check completed   room organization consistent   clutter free environment maintained     Problem: Comorbidity Management  Goal: Blood Pressure in Desired Range  Outcome: Progressing  Intervention: Maintain Blood Pressure Management  Recent Flowsheet Documentation  Taken 4/6/2024 1949 by Priscilla Levy RN  Medication Review/Management: medications reviewed     Problem: Malnutrition  Goal: Improved Nutritional Intake  Outcome: Progressing     Problem: Diarrhea  Goal: Effective Diarrhea Management  Outcome: Progressing  Intervention: Manage  Diarrhea  Recent Flowsheet Documentation  Taken 4/6/2024 1949 by Priscilla Levy, RN  Isolation Precautions:   enteric precautions maintained   enteric precautions initiated  Medication Review/Management: medications reviewed

## 2024-04-07 NOTE — PROGRESS NOTES
[facility]    Medicine Progress Note - Hospitalist Service    Date of Admission:  4/4/2024    Assessment & Plan      Zack Booth is a 84 year old male wit a history of metastatic lung adenocarcinoma, HTN, AF not on anticoagulation, CAD,PAD, admitted on 4/4/2024 after he presented to oncology clinic for infusion, however labs noted a potassium of 6.0and acute creatinine increased to 5.46 from baseline 1.4-1.6.  He was sent to the ED for further evaluation.  He is asymptomatic, he was shifted with insulin and given IV fluids.    MARIPOSA on CKD  Hyperkalemia  Suspect this is medication induced; possible prerenal but he reports he has been eating and drinking okay. It looks like his spironolactone was restarted 3/30 at 100 mg daily  -discontinued spironolactone, took off of med list  -measure urine output q4  -potassium recheck this evening 1900 and 2300  -urinalysis; suspect will be abnormal given MARIPOSA, eval for casts    Metastatic adenocarcinoma of lung, currently on palliative immunotherapy with Keytruda 2/1/2024  S/p palliative XRT L iliac bone lytic lesion 1/2024  Initially diagnosed 12/2023 during admission for fall, with CTAP with lytic lesion in L iliac with soft tissue extension as well as 2 cm lung lesion in RML, RLL 1.4 cm lung nodule. Biopsy of bone with metastatic adenocarcinoma with lung primary.After initial treatment he was admitted for duodenal ulcer, though GIB was unlikely related to keytruda, and given his high PD-L1 it was recommended he pursue treatment with keytruda; presented for infusion today.  -follow up after discharge    Recent GIB due to duodenal ulcer  During admission 2/2024. No further signs of recurrent GIB. He is off of aspirin and is on protonix and was treated with clarithromycin, amoxicillin regimen for H pylori. GIB was unlikely related to keytruda.  -continue ppi  -no nsaids    CAD  ASA on hold 2/2 recent GIB.    Episode of diarrhea.  Enteric panel  requested.          Diet: Combination Diet Regular Diet; 3 gm K Diet (low potassium)  Snacks/Supplements Adult: Ensure Enlive; With Meals    DVT Prophylaxis: Pneumatic Compression Devices  Gordon Catheter: Not present  Lines: None     Cardiac Monitoring: ACTIVE order. Indication: Electrolyte Imbalance (24 hours)- Magnesium <1.3 mg/ml; Potassium < =2.8 or > 5.5 mg/ml  Code Status: No CPR- Do NOT Intubate      Clinically Significant Risk Factors        # Hyperkalemia: Highest K = 5.8 mmol/L in last 2 days, will monitor as appropriate      # Anion Gap Metabolic Acidosis: Highest Anion Gap = 19 mmol/L in last 2 days, will monitor and treat as appropriate  # Hypoalbuminemia: Lowest albumin = 3.4 g/dL at 4/5/2024  7:56 AM, will monitor as appropriate      # Acute Kidney Injury, unspecified: based on a >150% or 0.3 mg/dL increase in last creatinine compared to past 90 day average, will monitor renal function  # Hypertension: Noted on problem list         # Severe Malnutrition: based on nutrition assessment, PRESENT ON ADMISSION          Disposition Plan     Expected Discharge Date: 04/07/2024                    Martín Tate MD  Hospitalist Service  [facility] [place]  Securely message with [employer] (more info)  Text page via [facility] Paging/Directory   ______________________________________________________________________    Interval History   Patient feels better except for episode of diarrhea today with incontinence.  Denies fever, abdominal pain, nausea or vomiting.  No other symptoms.  Renal chemistry with very mild progress.  Anton consult with nephrology, the plan is to continue IV fluids and reassess function tomorrow.  No decision to discharge for now.    Physical Exam   Vital Signs: Temp: 98.1  F (36.7  C) Temp src: Oral BP: 124/65 Pulse: 85   Resp: 24 SpO2: 94 % O2 Device: None (Room air) Oxygen Delivery: 5 LPM  Weight: 138 lbs 1.6 oz    Constitutional: awake, alert, cooperative, no apparent  distress, and appears stated age  Respiratory: No increased work of breathing, good air exchange, clear to auscultation bilaterally, no crackles or wheezing  Cardiovascular: Normal apical impulse, regular rate and rhythm, normal S1 and S2, no S3 or S4, and no murmur noted    Medical Decision Making       36 MINUTES SPENT BY ME on the date of service doing chart review, history, exam, documentation & further activities per the note.      Data     I have personally reviewed the following data over the past 24 hrs:    16.9 (H)  \   8.4 (L)   / 325     139 106 71.0 (H) /  123 (H)   4.8 19 (L) 6.21 (H) \       Imaging results reviewed over the past 24 hrs:   No results found for this or any previous visit (from the past 24 hour(s)).

## 2024-04-08 NOTE — PROGRESS NOTES
Renal Medicine Progress Note            Assessment/Plan:     Assessment: Zack Booth is a 85 yo male with PMH metastatic lung adenocarcinoma, HTN, A-fib, CAD, PAD admitted 4//24 due to hyperkalemia and MARIPOSA.    MARIPOSA, improving  Creatinine 5.46 on admission, peak 6.54, slowly improving.  FENa 6.1.  Highly suspect ATN due to hypovolemia in the setting of spironolactone use.  Worsening creatinine related to ongoing diarrhea.  -Continue IVF as below for today, reassess daily    CKD 3A  Baseline creatinine around 1.4-1.6.    Hyperkalemia, resolved  K5.7 on arrival.  In the setting of spironolactone use and MARIPOSA.  Improved with Lokelma and IVF.  -No further Lokelma needed    AGMA, resolved  Initial acidosis with an anion gap, gap closed and now acidosis resolved with bicarb containing fluids.    Metastatic lung adenocarcinoma  Had started treatment with Keytruda and radiation.  Had a complication with Keytruda, no suspicion for MARIPOSA related to Keytruda use.    Norovirus diarrhea  Continued diarrhea.    HTN  A-fib  Recent GI bleed due to duodenal ulcer    Plan/Recs:  1) continue D5 1/2 NS + sodium bicarb (75 mEq/L) 100 ml/h given ongoing diarrhea  2) no further Lokelma needed    Plan discussed with Dr. Tate.  Reviewed notes from Dr. Tate (hospitalist), Dr. Cabrales (nephrology)/       Ame Masterson MD   [facility]  Office: [phone]          Interval History:     40 beats of V. tach overnight, electrolytes normal  Patient reports feeling okay, hoping to go home as soon as able  Diarrhea noted in chart, patient denies having significant diarrhea despite that  Denies shortness of breath  Denies nausea or vomiting  Poor appetite unchanged for months  Reports urinating well, not all is documented          Medications and Allergies:     Current Facility-Administered Medications   Medication Dose Route Frequency Provider Last Rate Last Admin    multivitamin w/minerals (THERA-VIT-M) tablet 1 tablet  1  "tablet Oral Daily Martín Tate MD   1 tablet at 04/08/24 1011    pantoprazole (PROTONIX) EC tablet 40 mg  40 mg Oral BID AC Eduarda Nava, DO   40 mg at 04/08/24 0639    sodium chloride (PF) 0.9% PF flush 3 mL  3 mL Intracatheter Q8H Eduarda Nava,    3 mL at 04/06/24 1339    thiamine (B-1) tablet 100 mg  100 mg Oral Daily Martín Tate MD   100 mg at 04/08/24 1011        Allergies   Allergen Reactions    Atenolol      Other reaction(s): Bradycardia    Lisinopril      Other reaction(s): *Unknown    Statins Muscle Pain (Myalgia)     Bad dreams    Ibuprofen      Other reaction(s): Headache            Physical Exam:   Vitals were reviewed  /63 (BP Location: Right arm, Patient Position: Semi-Donato's, Cuff Size: Adult Regular)   Pulse 78   Temp 98  F (36.7  C) (Oral)   Resp 20   Ht 1.727 m (5' 8\")   Wt 63.1 kg (139 lb 1.8 oz)   SpO2 95%   BMI 21.15 kg/m      Wt Readings from Last 3 Encounters:   04/08/24 63.1 kg (139 lb 1.8 oz)   02/09/24 65.6 kg (144 lb 10 oz)   02/04/24 68.8 kg (151 lb 10.8 oz)     No intake or output data in the 24 hours ending 04/08/24 1311    GENERAL APPEARANCE: NAD  HEENT: normocephalic, dry MM   RESP: clear  CV: regular rate  EXTREMITIES/SKIN: no edema  NEURO:  alert, oriented, normal speech         Data:     BMP  Recent Labs   Lab 04/08/24  0730 04/08/24  0254 04/08/24  0221 04/08/24  0147 04/07/24  0721 04/06/24  0803 04/05/24  2328 04/05/24 2011 04/05/24  0756     --   --   --  139 142  --  136 138   POTASSIUM 4.1  --   --  3.7 4.8 4.8  4.8   < > 5.8* 5.1   CHLORIDE 102  --   --   --  106 107  --   --  107   KAUR 8.3*  --   --   --  8.4* 9.2  --   --  9.1   CO2 25  --   --   --  19* 16*  --   --  17*   BUN 64.6*  --   --   --  71.0* 66.4*  --   --  63.1*   CR 6.09*  --   --   --  6.21* 6.54*  --  6.12* 5.70*   GLC 97 100* 86  --  123* 101*  --   --  107*    < > = values in this interval not displayed.     CBC  Recent Labs   Lab 04/07/24  0721 04/05/24  0756 " 04/04/24  1424   WBC 16.9* 10.5 11.5*   HGB 8.4* 8.9* 9.6*   HCT 28.0* 30.3* 31.7*   MCV 86 87 87    373 440     Lab Results   Component Value Date    AST 10 02/09/2024    ALT 6 02/09/2024    ALKPHOS 76 02/09/2024    BILITOTAL 0.3 02/09/2024     Lab Results   Component Value Date    INR 1.18 (H) 02/03/2024     Color Urine (no units)   Date Value   04/04/2024 Light Yellow     Appearance Urine (no units)   Date Value   04/04/2024 Slightly Cloudy (A)     Glucose Urine (mg/dL)   Date Value   04/04/2024 Negative     Bilirubin Urine (no units)   Date Value   04/04/2024 Negative     Ketones Urine (mg/dL)   Date Value   04/04/2024 Negative     Specific Gravity Urine (no units)   Date Value   04/04/2024 1.011     pH Urine (no units)   Date Value   04/04/2024 6.0     Protein Albumin Urine (mg/dL)   Date Value   04/04/2024 10 (A)     Nitrite Urine (no units)   Date Value   04/04/2024 Negative     Leukocyte Esterase Urine (no units)   Date Value   04/04/2024 Large (A)         Attestation:  I have reviewed today's vital signs, notes, medications, labs and imaging.    Ame Masterson MD  [facility] - Nephrology  Office: [phone]

## 2024-04-08 NOTE — PLAN OF CARE
"Patient denied pain, VS stable. Up to chair, walked in room. Voiding without problem. Continue IV fluid.       Problem: Adult Inpatient Plan of Care  Goal: Plan of Care Review  Description: The Plan of Care Review/Shift note should be completed every shift.  The Outcome Evaluation is a brief statement about your assessment that the patient is improving, declining, or no change.  This information will be displayed automatically on your shift  note.  Outcome: Progressing  Flowsheets (Taken 4/7/2024 2211)  Plan of Care Reviewed With: patient  Overall Patient Progress: improving  Goal: Patient-Specific Goal (Individualized)  Description: You can add care plan individualizations to a care plan. Examples of Individualization might be:  \"Parent requests to be called daily at 9am for status\", \"I have a hard time hearing out of my right ear\", or \"Do not touch me to wake me up as it startles  me\".  Outcome: Progressing  Goal: Absence of Hospital-Acquired Illness or Injury  Outcome: Progressing  Intervention: Identify and Manage Fall Risk  Recent Flowsheet Documentation  Taken 4/7/2024 1600 by Claudia Vazquez RN  Safety Promotion/Fall Prevention:   activity supervised   assistive device/personal items within reach   clutter free environment maintained   nonskid shoes/slippers when out of bed   supervised activity   toileting scheduled  Intervention: Prevent Skin Injury  Recent Flowsheet Documentation  Taken 4/7/2024 1600 by Claudia Vazquez, RN  Body Position: position changed independently  Intervention: Prevent Infection  Recent Flowsheet Documentation  Taken 4/7/2024 1600 by Claudia Vazquez, RN  Infection Prevention:   personal protective equipment utilized   single patient room provided  Goal: Optimal Comfort and Wellbeing  Outcome: Progressing  Goal: Readiness for Transition of Care  Outcome: Progressing     Problem: Fall Injury Risk  Goal: Absence of Fall and Fall-Related Injury  Outcome: Progressing  Intervention: " Identify and Manage Contributors  Recent Flowsheet Documentation  Taken 4/7/2024 1600 by Claudia Vazquez RN  Medication Review/Management:   medications reviewed   high-risk medications identified  Intervention: Promote Injury-Free Environment  Recent Flowsheet Documentation  Taken 4/7/2024 1600 by Claudia Vazquez RN  Safety Promotion/Fall Prevention:   activity supervised   assistive device/personal items within reach   clutter free environment maintained   nonskid shoes/slippers when out of bed   supervised activity   toileting scheduled     Problem: Comorbidity Management  Goal: Blood Pressure in Desired Range  Outcome: Progressing  Intervention: Maintain Blood Pressure Management  Recent Flowsheet Documentation  Taken 4/7/2024 1600 by Claudia Vazquez RN  Medication Review/Management:   medications reviewed   high-risk medications identified     Problem: Malnutrition  Goal: Improved Nutritional Intake  Outcome: Progressing     Problem: Diarrhea  Goal: Effective Diarrhea Management  Outcome: Progressing  Intervention: Manage Diarrhea  Recent Flowsheet Documentation  Taken 4/7/2024 1600 by Claudia Vazquez RN  Isolation Precautions: contact precautions maintained  Medication Review/Management:   medications reviewed   high-risk medications identified     Problem: Infection  Goal: Absence of Infection Signs and Symptoms  Outcome: Progressing  Intervention: Prevent or Manage Infection  Recent Flowsheet Documentation  Taken 4/7/2024 1600 by Claudia Vazquez RN  Isolation Precautions: contact precautions maintained   Goal Outcome Evaluation:      Plan of Care Reviewed With: patient    Overall Patient Progress: improvingOverall Patient Progress: improving

## 2024-04-08 NOTE — CONSULTS
Care Management Initial Consult    General Information  Assessment completed with: Patient,    Type of CM/SW Visit: Initial Assessment    Primary Care Provider verified and updated as needed: Yes   Readmission within the last 30 days: no previous admission in last 30 days      Reason for Consult: care coordination/care conference, discharge planning    Communication Assessment  Patient's communication style: spoken language (English or Bilingual)    Hearing Difficulty or Deaf: no   Wear Glasses or Blind: yes    Cognitive  Cognitive/Neuro/Behavioral: WDL                      Living Environment:   People in home: spouse     Current living Arrangements: apartment      Able to return to prior arrangements: yes       Family/Social Support:  Care provided by: self  Provides care for: no one  Marital Status:   Wife, Children          Description of Support System: Supportive, Involved    Support Assessment: Adequate family and caregiver support    Current Resources:   Patient receiving home care services: No     Community Resources: None  Equipment currently used at home: cane, straight  Supplies currently used at home: None    Lifestyle & Psychosocial Needs:  Social Determinants of Health     Food Insecurity: Not on file   Depression: Not on file   Housing Stability: Not on file   Tobacco Use: High Risk (2/3/2024)    Patient History     Smoking Tobacco Use: Every Day     Smokeless Tobacco Use: Never     Passive Exposure: Not on file   Financial Resource Strain: Not on file   Alcohol Use: Not on file   Transportation Needs: Not on file   Physical Activity: Not on file   Interpersonal Safety: Not on file   Stress: Not on file   Social Connections: Not on file       Functional Status:  Prior to admission patient needed assistance:   Dependent ADLs:: Ambulation-cane  Dependent IADLs:: Independent                     Additional Information:  CM consulted for elevated risk score. Patient was admitted with MARIPOSA with URR  22%. He has history of lung cancer and follows closely with his Oncologist. Met with patient at bedside to discuss plan of care. He verifies that he lives at home with his wife. He is independent with his cane at home. They do not have any in home services. He is independent with ADLs. He has an Oncology appt tomorrow. Patient states his wife will change that appt and make any other follow up appts for him. He does not anticipate any needs on discharge. No CM needs identified. Will cont to follow if discharge needs arise.             Tania Farah RN BSN CM  Inpatient Care Coordination  [facility]  [phone]

## 2024-04-08 NOTE — PLAN OF CARE
"Goal Outcome Evaluation:      Plan of Care Reviewed With: patient    Overall Patient Progress: improvingOverall Patient Progress: improving    Outcome Evaluation: Patient Potassium level at 4.1, Lokelma discontinued per provider. Creatitnine improving since 4/5. Up and in chair for meals, standby assist for in room ambulation. Continent of bladder and bowels. Appetite and intake adequate to poor. Patient wished to rest and requested minimal interactions/distractions. Enteric precautions maintained.    Blood pressure 127/71, pulse 81, temperature 98.3  F (36.8  C), temperature source Oral, resp. rate 19, height 1.727 m (5' 8\"), weight 63.1 kg (139 lb 1.8 oz), SpO2 95%.       Problem: Adult Inpatient Plan of Care  Goal: Plan of Care Review  Description: The Plan of Care Review/Shift note should be completed every shift.  The Outcome Evaluation is a brief statement about your assessment that the patient is improving, declining, or no change.  This information will be displayed automatically on your shift  note.  Outcome: Progressing  Flowsheets (Taken 4/8/2024 1506)  Outcome Evaluation: Patient Potassium level at 4.1, Lokelma discontinued per provider. Creatitnine improving since 4/5. Up and in chair for meals, standby assist for in room ambulation. Continent of bladder and bowels. Appetite and intake adequate to poor. Patient wished to rest and requested minimal interactions/distractions. Enteric precautions maintained.  Plan of Care Reviewed With: patient  Overall Patient Progress: improving  Goal: Patient-Specific Goal (Individualized)  Description: You can add care plan individualizations to a care plan. Examples of Individualization might be:  \"Parent requests to be called daily at 9am for status\", \"I have a hard time hearing out of my right ear\", or \"Do not touch me to wake me up as it startles  me\".  Outcome: Progressing  Goal: Absence of Hospital-Acquired Illness or Injury  Outcome: Progressing  Intervention: " Identify and Manage Fall Risk  Recent Flowsheet Documentation  Taken 4/8/2024 1011 by Ab Camarena RN  Safety Promotion/Fall Prevention:   activity supervised   clutter free environment maintained   lighting adjusted   mobility aid in reach   nonskid shoes/slippers when out of bed  Intervention: Prevent Skin Injury  Recent Flowsheet Documentation  Taken 4/8/2024 1011 by Ab Camarena RN  Body Position: position changed independently  Intervention: Prevent Infection  Recent Flowsheet Documentation  Taken 4/8/2024 1011 by Ab Camarena RN  Infection Prevention:   equipment surfaces disinfected   environmental surveillance performed   hand hygiene promoted   personal protective equipment utilized  Taken 4/8/2024 0748 by Ab Camarena RN  Infection Prevention:   equipment surfaces disinfected   hand hygiene promoted   environmental surveillance performed   personal protective equipment utilized  Goal: Optimal Comfort and Wellbeing  Outcome: Progressing  Goal: Readiness for Transition of Care  Outcome: Progressing     Problem: Malnutrition  Goal: Improved Nutritional Intake  Outcome: Progressing     Problem: Diarrhea  Goal: Effective Diarrhea Management  Outcome: Progressing  Intervention: Manage Diarrhea  Recent Flowsheet Documentation  Taken 4/8/2024 1011 by Ab Camarena RN  Isolation Precautions: enteric precautions maintained  Medication Review/Management: medications reviewed  Taken 4/8/2024 0748 by Ab Camarena RN  Isolation Precautions: enteric precautions maintained     Problem: Infection  Goal: Absence of Infection Signs and Symptoms  Outcome: Progressing  Intervention: Prevent or Manage Infection  Recent Flowsheet Documentation  Taken 4/8/2024 1011 by Ab Camarena RN  Infection Management: aseptic technique maintained  Isolation Precautions: enteric precautions maintained  Taken 4/8/2024 0748 by Ab Camarena RN  Isolation Precautions: enteric precautions maintained

## 2024-04-08 NOTE — PROGRESS NOTES
[facility]    Medicine Progress Note - Hospitalist Service    Date of Admission:  4/4/2024    Assessment & Plan    Zack Booth, an 84-year-old male with a past medical history of metastatic lung adenocarcinoma, hypertension (HTN), atrial fibrillation (AF) not on anticoagulation, Coronary Artery Disease (CAD), Peripheral Artery Disease (PAD), was admitted on 4/4/2024 due to lab findings of hyperkalemia and acute kidney injury.    Presenting Complaints:  Patient was asymptomatic but presented with a potassium level of 6.0 and acute creatinine increase to 5.46 from a baseline of 1.4-1.6.    Diagnosis and Management:    1. Acute Kidney Injury (MARIPOSA) on Chronic Kidney Disease (CKD) and Hyperkalemia:  - Spironolactone, suspected as the cause, was discontinued.  - Urine output to be measured every 4 hours.  - Potassium recheck scheduled for this evening at 1900 and 2300.  - Urinalysis to be performed; abnormal results are expected given MARIPOSA.  -Continue hydrating per nephrology plan.  Nephrology assistance in management highly appreciated.    2. Metastatic Adenocarcinoma of Lung:  - Currently on palliative immunotherapy with Keytruda as of 2/1/2024.  - Patient underwent palliative radiotherapy for a lytic lesion in left iliac bone in 1/2024.  - Follow up scheduled after discharge.    3. Recent Gastrointestinal Bleeding (GIB) due to Duodenal Ulcer:  - Proton pump inhibitor (PPI) therapy to be continued.  - NSAIDs are contraindicated.  - Patient is off aspirin due to recent GIB.    4. Coronary Artery Disease (CAD):  - Aspirin therapy has been temporarily discontinued due to recent GIB.    5. Episode of Diarrhea:  - An enteric panel done positive for norovirus.  -Enteric isolation and symptomatic treatment..    The patient's condition is being closely monitored, and changes in his treatment plan will be made as necessary based on his response to the current interventions.      Diet: Combination Diet  Regular Diet; 3 gm K Diet (low potassium)  Snacks/Supplements Adult: Ensure Enlive; With Meals    DVT Prophylaxis: Pneumatic Compression Devices  Gordon Catheter: Not present  Lines: None     Cardiac Monitoring: ACTIVE order. Indication: Electrolyte Imbalance (24 hours)- Magnesium <1.3 mg/ml; Potassium < =2.8 or > 5.5 mg/ml  Code Status: No CPR- Do NOT Intubate      Clinically Significant Risk Factors              # Hypoalbuminemia: Lowest albumin = 3.4 g/dL at 4/5/2024  7:56 AM, will monitor as appropriate      # Acute Kidney Injury, unspecified: based on a >150% or 0.3 mg/dL increase in last creatinine compared to past 90 day average, will monitor renal function  # Hypertension: Noted on problem list         # Severe Malnutrition: based on nutrition assessment, PRESENT ON ADMISSION          Disposition Plan      Expected Discharge Date: 04/09/2024      Destination: home with family              Martín Tate MD  Hospitalist Service  [facility]  Securely message with [employer] (more info)  Text page via [employer] Paging/Directory   ______________________________________________________________________    Interval History   Patient feels better but continues to have diarrhea with incontinence.  No fever fever, abdominal pain, nausea or vomiting.  No other symptoms.  Renal chemistry with very mild progress.  Nephrology plan is to continue IV fluids and reassess function tomorrow.  He might discharge tomorrow with follow-up with outpatient by nephrology..    Physical Exam   Vital Signs: Temp: 98  F (36.7  C) Temp src: Oral BP: 137/63 Pulse: 78   Resp: 20 SpO2: 95 % O2 Device: None (Room air)    Weight: 139 lbs 1.76 oz    Constitutional: awake, alert, cooperative, no apparent distress, and appears stated age  Respiratory: No increased work of breathing, good air exchange, clear to auscultation bilaterally, no crackles or wheezing  Cardiovascular: Normal apical impulse, regular rate and rhythm, normal  S1 and S2, no S3 or S4, and no murmur noted    Medical Decision Making       35 MINUTES SPENT BY ME on the date of service doing chart review, history, exam, documentation & further activities per the note.      Data     I have personally reviewed the following data over the past 24 hrs:    N/A  \   N/A   / N/A     138 102 64.6 (H) /  97   4.1 25 6.09 (H) \     Procal: 0.49 CRP: N/A Lactic Acid: N/A         Imaging results reviewed over the past 24 hrs:   No results found for this or any previous visit (from the past 24 hour(s)).

## 2024-04-08 NOTE — PLAN OF CARE
"Goal Outcome Evaluation:      Plan of Care Reviewed With: patient    Overall Patient Progress: no changeOverall Patient Progress: no change    Outcome Evaluation: Electrolytes improving. Potassium level improving and is down to 3.7, Mag level=1.8 IVF infusing.    Pt. A&Ox4, up with assist of 1, gait belt (often refuses), Tele: Vy CVR, pt. Did have a 40 beat run of MD Howard was paged, electrolytes drawn. Pt. Denies any chest pain or SOB. Pt. Continues on Enteric Isolation (Norovirus). Pt. Denies any needs at this time. Will continue with POC.    Problem: Adult Inpatient Plan of Care  Goal: Plan of Care Review  Description: The Plan of Care Review/Shift note should be completed every shift.  The Outcome Evaluation is a brief statement about your assessment that the patient is improving, declining, or no change.  This information will be displayed automatically on your shift  note.  Outcome: Progressing  Flowsheets (Taken 4/8/2024 7211)  Outcome Evaluation: Electrolytes improving. Potassium level improving and is down to 3.7, Mag level=1.8 IVF infusing.  Plan of Care Reviewed With: patient  Overall Patient Progress: no change  Goal: Patient-Specific Goal (Individualized)  Description: You can add care plan individualizations to a care plan. Examples of Individualization might be:  \"Parent requests to be called daily at 9am for status\", \"I have a hard time hearing out of my right ear\", or \"Do not touch me to wake me up as it startles  me\".  Outcome: Progressing  Goal: Absence of Hospital-Acquired Illness or Injury  Outcome: Progressing  Intervention: Identify and Manage Fall Risk  Recent Flowsheet Documentation  Taken 4/7/2024 3091 by Kimberley Núñez, RN  Safety Promotion/Fall Prevention:   activity supervised   clutter free environment maintained   increased rounding and observation   increase visualization of patient   lighting adjusted   nonskid shoes/slippers when out of bed   safety round/check " completed  Intervention: Prevent Skin Injury  Recent Flowsheet Documentation  Taken 4/8/2024 0022 by Kimberley Núñez RN  Body Position: position changed independently  Intervention: Prevent and Manage VTE (Venous Thromboembolism) Risk  Recent Flowsheet Documentation  Taken 4/7/2024 2321 by Kimberley Núñez RN  VTE Prevention/Management: SCDs (sequential compression devices) off  Intervention: Prevent Infection  Recent Flowsheet Documentation  Taken 4/7/2024 2321 by Kimberley Núñez RN  Infection Prevention: personal protective equipment utilized  Goal: Optimal Comfort and Wellbeing  Outcome: Progressing  Goal: Readiness for Transition of Care  Outcome: Progressing     Problem: Fall Injury Risk  Goal: Absence of Fall and Fall-Related Injury  Outcome: Progressing  Intervention: Identify and Manage Contributors  Recent Flowsheet Documentation  Taken 4/7/2024 2321 by Kimberley Núñez RN  Medication Review/Management: medications reviewed  Intervention: Promote Injury-Free Environment  Recent Flowsheet Documentation  Taken 4/7/2024 2321 by Kimberley Núñez RN  Safety Promotion/Fall Prevention:   activity supervised   clutter free environment maintained   increased rounding and observation   increase visualization of patient   lighting adjusted   nonskid shoes/slippers when out of bed   safety round/check completed     Problem: Comorbidity Management  Goal: Blood Pressure in Desired Range  Outcome: Progressing  Intervention: Maintain Blood Pressure Management  Recent Flowsheet Documentation  Taken 4/7/2024 2321 by Kimberley Núñez RN  Medication Review/Management: medications reviewed     Problem: Malnutrition  Goal: Improved Nutritional Intake  Outcome: Progressing     Problem: Diarrhea  Goal: Effective Diarrhea Management  Outcome: Progressing  Intervention: Manage Diarrhea  Recent Flowsheet Documentation  Taken 4/7/2024 2321 by Kimberley Núñez RN  Isolation Precautions: enteric precautions maintained  Medication  Review/Management: medications reviewed     Problem: Infection  Goal: Absence of Infection Signs and Symptoms  Outcome: Progressing  Intervention: Prevent or Manage Infection  Recent Flowsheet Documentation  Taken 4/7/2024 2321 by Kimberley Núñez, RN  Isolation Precautions: enteric precautions maintained

## 2024-04-09 NOTE — PLAN OF CARE
Goal Outcome Evaluation:      Plan of Care Reviewed With: patient, family    Overall Patient Progress: decliningOverall Patient Progress: declining    Outcome Evaluation: Pt eating 25% of meals per nursing and pt with no appetite per pt report.     Nutrition Prescription    RECOMMENDATIONS FOR MDs/PROVIDERS TO ORDER:  Consider assessing for iron deficiency    Malnutrition Status:    Severe in the context of chronic illness 4/5/24    Recommendations already ordered by Registered Dietitian (RD):  Vitamin D lab w/ cosign dt documented deficiency  Decreased Ensure supplements to 4oz BID b/n meals dt elevated phos labs and building up in room  Vanilla Magic cup TID w/ meals    Future/Additional Recommendations:  Adjust supplements pending PO intake/pt preference

## 2024-04-09 NOTE — PROGRESS NOTES
Renal Medicine Progress Note            Assessment/Plan:     Assessment: Zack Booth is a 83 yo male with PMH metastatic lung adenocarcinoma, HTN, A-fib, CAD, PAD admitted 4//24 due to hyperkalemia and MARIPOSA.    MARIPOSA, stable  Creatinine 5.46 on admission, peak 6.54, slowly improving.  FENa 6.1.  Highly suspect ATN due to hypovolemia in the setting of spironolactone use.  Cr plateau, not improved. Appears euvolemic now, no uremic symptoms. Ok to discharge with close follow up. Discussed if renal function were to worsen and an acute indication for dialysis would come up that I would not recommend dialysis given his metastatic cancer. He states he would not want dialysis if it were indicated.     CKD 3A  Baseline creatinine around 1.4-1.6.    Hyperkalemia, resolved  K5.7 on arrival.  In the setting of spironolactone use and MARIPOSA.  Improved with Lokelma and IVF.  -No further Lokelma needed    AGMA, resolved  Initial acidosis with an anion gap, gap closed and now acidosis resolved with bicarb containing fluids.    Metastatic lung adenocarcinoma  Had started treatment with Keytruda and radiation.  Had a complication with Keytruda, no suspicion for MARIPOSA related to Keytruda use.    Norovirus diarrhea  Continued diarrhea.    HTN  A-fib  Recent GI bleed due to duodenal ulcer    Plan/Recs:  1) no further IVF or lokelma needed   2) Ok to discharge from nephrology perspective  3) BMP Thursday to ensure no worsening of renal function   4) No spironolactone going forward.   5) encouraged oral hydration to patient   6) Nephrology follow up Wed 4/17 9am at [facility]     Plan discussed with Dr. Tate.  Reviewed notes from Dr. Tate (hospitalist).      Ame Masterson MD   [facility]  Office: [phone]          Interval History:     No acute events   Feels well   Eating well, reports good oral hydration   Denies lightheadedness or orthostatic symptoms   Denies n/v   Denies dysgeusia   Denies SOB  "  Reports good UOP   Denies further diarrhea     Discussed dialysis at length. He states he would not want it. He also would not be a candidate given his metastatic cancer, he was understanding of this.           Medications and Allergies:     Current Facility-Administered Medications   Medication Dose Route Frequency Provider Last Rate Last Admin    multivitamin w/minerals (THERA-VIT-M) tablet 1 tablet  1 tablet Oral Daily Martín Tate MD   1 tablet at 04/09/24 0906    pantoprazole (PROTONIX) EC tablet 40 mg  40 mg Oral BID AC Eduarda Nava, DO   40 mg at 04/09/24 0906    sodium chloride (PF) 0.9% PF flush 3 mL  3 mL Intracatheter Q8H Eduarda Nava,    3 mL at 04/09/24 0742    thiamine (B-1) tablet 100 mg  100 mg Oral Daily Martín Tate MD   100 mg at 04/09/24 0906        Allergies   Allergen Reactions    Atenolol      Other reaction(s): Bradycardia    Lisinopril      Other reaction(s): *Unknown    Statins Muscle Pain (Myalgia)     Bad dreams    Ibuprofen      Other reaction(s): Headache            Physical Exam:   Vitals were reviewed  BP (!) 148/81 (BP Location: Left arm, Patient Position: Semi-Donato's)   Pulse 85   Temp 98.4  F (36.9  C) (Oral)   Resp 20   Ht 1.727 m (5' 8\")   Wt 64.2 kg (141 lb 8.6 oz)   SpO2 94%   BMI 21.52 kg/m      Wt Readings from Last 3 Encounters:   04/09/24 64.2 kg (141 lb 8.6 oz)   02/09/24 65.6 kg (144 lb 10 oz)   02/04/24 68.8 kg (151 lb 10.8 oz)     No intake or output data in the 24 hours ending 04/08/24 1311    GENERAL APPEARANCE: NAD  HEENT: normocephalic, MMM   RESP: clear  CV: regular rate  EXTREMITIES/SKIN: no edema  NEURO:  alert, oriented, normal speech         Data:     BMP  Recent Labs   Lab 04/09/24  0729 04/08/24  0730 04/08/24  0254 04/08/24  0221 04/08/24  0147 04/07/24  0721 04/06/24  0803    138  --   --   --  139 142   POTASSIUM 3.8 4.1  --   --  3.7 4.8 4.8  4.8   CHLORIDE 103 102  --   --   --  106 107   KAUR 8.0* 8.3*  --   --   --  " 8.4* 9.2   CO2 23 25  --   --   --  19* 16*   BUN 61.3* 64.6*  --   --   --  71.0* 66.4*   CR 6.22* 6.09*  --   --   --  6.21* 6.54*   * 97 100* 86  --  123* 101*     CBC  Recent Labs   Lab 04/09/24  0729 04/07/24  0721 04/05/24  0756 04/04/24  1424   WBC 9.6 16.9* 10.5 11.5*   HGB 7.7* 8.4* 8.9* 9.6*   HCT 26.0* 28.0* 30.3* 31.7*   MCV 86 86 87 87    325 373 440     Lab Results   Component Value Date    AST 10 02/09/2024    ALT 6 02/09/2024    ALKPHOS 76 02/09/2024    BILITOTAL 0.3 02/09/2024     Lab Results   Component Value Date    INR 1.18 (H) 02/03/2024     Color Urine (no units)   Date Value   04/04/2024 Light Yellow     Appearance Urine (no units)   Date Value   04/04/2024 Slightly Cloudy (A)     Glucose Urine (mg/dL)   Date Value   04/04/2024 Negative     Bilirubin Urine (no units)   Date Value   04/04/2024 Negative     Ketones Urine (mg/dL)   Date Value   04/04/2024 Negative     Specific Gravity Urine (no units)   Date Value   04/04/2024 1.011     pH Urine (no units)   Date Value   04/04/2024 6.0     Protein Albumin Urine (mg/dL)   Date Value   04/04/2024 10 (A)     Nitrite Urine (no units)   Date Value   04/04/2024 Negative     Leukocyte Esterase Urine (no units)   Date Value   04/04/2024 Large (A)         Attestation:  I have reviewed today's vital signs, notes, medications, labs and imaging.    Ame Masterson MD  [facility] - Nephrology  Office: [phone]

## 2024-04-09 NOTE — PLAN OF CARE
"Pt alert and oriented. Denies pain. RA. SBA with cane. No BM tonight.possible discharge today with follow up by outpatient Nephrology.  Problem: Adult Inpatient Plan of Care  Goal: Plan of Care Review  Description: The Plan of Care Review/Shift note should be completed every shift.  The Outcome Evaluation is a brief statement about your assessment that the patient is improving, declining, or no change.  This information will be displayed automatically on your shift  note.  Outcome: Not Progressing  Flowsheets (Taken 4/9/2024 0511)  Outcome Evaluation: pt has no appetite.on iv fluids for hydration.  Plan of Care Reviewed With: patient  Overall Patient Progress: no change  Goal: Patient-Specific Goal (Individualized)  Description: You can add care plan individualizations to a care plan. Examples of Individualization might be:  \"Parent requests to be called daily at 9am for status\", \"I have a hard time hearing out of my right ear\", or \"Do not touch me to wake me up as it startles  me\".  Outcome: Not Progressing     Problem: Malnutrition  Goal: Improved Nutritional Intake  4/9/2024 0515 by Cait Goncalves, RN  Outcome: Not Progressing  4/9/2024 0511 by Cait Goncalves, RN  Outcome: Not Progressing   Goal Outcome Evaluation:      Plan of Care Reviewed With: patient    Overall Patient Progress: no changeOverall Patient Progress: no change    Outcome Evaluation: pt has no appetite.on iv fluids for hydration.      "

## 2024-04-09 NOTE — PLAN OF CARE
"Goal Outcome Evaluation:      Plan of Care Reviewed With: patient    Overall Patient Progress: improvingOverall Patient Progress: improving    Outcome Evaluation: Pt reported no loose stools and potassium levels stable.    Blood pressure 127/71, pulse 81, temperature 98.3  F (36.8  C), temperature source Oral, resp. rate 19, height 1.727 m (5' 8\"), weight 63.1 kg (139 lb 1.8 oz), SpO2 95%.    Assumed care of patient at 1530.  VSS.  A&Ox4. Denies pain, CP, and SOB.  SBA with cane.  PIV in right arm running D5 in 1/2NS with 75meq sodium bicarb an hour.  With family through part of shift.  LS diminished.  Continue with POC.    "

## 2024-04-09 NOTE — PROGRESS NOTES
"CLINICAL NUTRITION SERVICES - REASSESSMENT NOTE     Nutrition Prescription    RECOMMENDATIONS FOR MDs/PROVIDERS TO ORDER:  Consider assessing for iron deficiency    Malnutrition Status:    Severe in the context of chronic illness 4/5/24    Recommendations already ordered by Registered Dietitian (RD):  Vitamin D lab w/ cosign dt documented deficiency  Decreased Ensure supplements to 4oz BID b/n meals dt elevated phos labs and building up in room  Vanilla Magic cup TID w/ meals    Future/Additional Recommendations:  Adjust supplements pending PO intake/pt preference       EVALUATION OF THE PROGRESS TOWARD GOALS   Diet: 3 g Potassium and Regular  Nutrition Support: none  Intake: Pt eating 25% of meals per nursing records and receiving on average 2133kcals and 97g protein daily       NEW FINDINGS   Pt stated he has no appetite. Likes the Ensure. He has a couple saved in his room yet would still like to receive them. Also willing to try Magic cup.     04/08/24 0413 63.1 kg (139 lb 1.8 oz)   04/07/24 0452 62.6 kg (138 lb 1.6 oz)   04/04/24 1749 63.4 kg (139 lb 11.2 oz)   04/04/24 1339 63 kg (139 lb)       ANTHROPOMETRICS  Height: 172.7 cm (5' 8\")  Most Recent Weight: 63.1 kg (139 lb 1.8 oz)    IBW: 68.4 kg  BMI: Normal BMI  Weight History:   Wt Readings from Last 30 Encounters:   04/08/24 63.1 kg (139 lb 1.8 oz)   02/09/24 65.6 kg (144 lb 10 oz)   02/04/24 68.8 kg (151 lb 10.8 oz)   03/04/15 84.7 kg (186 lb 12.8 oz)     Dosing Weight: 63.4 kg     ASSESSED NUTRITION NEEDS  Estimated Energy Needs: 1823 kcals/day ([place] Jeor)  Justification: Stress factor 1.4, Increased needs, and Repletion  Estimated Protein Needs: 51-63 grams protein/day (0.8-1 grams of pro/kg)  Justification: Maintenance and CKD  Estimated Fluid Needs: 3858-8597 mL/day (25 - 30 mL/kg)   Justification: Maintenance    PHYSICAL FINDINGS  See malnutrition section below.  Small bruising throughout per Nursing records     GI CONCERNS  LBM 4/8/24 per " Pt    LABS  Reviewed: copper 146.9, Un 64.6, Cr 6.09, GFR 8, P 5.7    MEDICATIONS  Reviewed: thera vit m, protonix, thiamine    MALNUTRITION: 4/5/24  % Weight Loss:  Weight loss does not meet criteria for malnutrition as it is not significant enough  % Intake:  </= 50% for >/= 1 month (severe malnutrition)  Subcutaneous Fat Loss:  Orbital region severe depletion and Upper arm region moderate-severe depletion  Muscle Loss:  Temporal region moderate to severe depletion, Clavicle bone region severe depletion, Acromion bone region severe depletion, Dorsal hand region severe depletion, Patellar region moderate depletion, and Posterior calf region severe depletion  Fluid Retention:  None noted    Malnutrition Diagnosis: Severe malnutrition  In Context of:  Chronic illness or disease    Previous Goals   Patient to consume % of nutritionally adequate meals three times per day, or the equivalent with supplements/snacks.  Total avg nutritional intake to meet a minimum of 1823 kcal/kg and 76 g PRO/kg daily (per dosing wt 63.4 kg).  Evaluation: Not met    Previous Nutrition Diagnosis  Malnutrition related to poor appetite as evidenced by severe subcutaneous fat loss, severe muscle loss, and prolonged poor intake of < 50% estimated energy needs for > 1 month    Evaluation: No change    CURRENT NUTRITION DIAGNOSIS  Malnutrition related to poor appetite as evidenced by severe subcutaneous fat loss, severe muscle loss, and prolonged poor intake of < 50% estimated energy needs for > 1 month      INTERVENTIONS  Implementation  Collaboration with other providers  Medical food supplement therapy    Goals  Patient to consume % of nutritionally adequate meals three times per day, or the equivalent with supplements/snacks.  Total avg nutritional intake to meet a minimum of 1823 kcal/kg and 51 g PRO/kg daily (per dosing wt 63.4 kg).    Monitoring/Evaluation  Progress toward goals will be monitored and evaluated per protocol. PO  intake, supplement tolerance, wt, labs

## 2024-04-09 NOTE — PLAN OF CARE
"Goal Outcome Evaluation:      Plan of Care Reviewed With: patient    Overall Patient Progress: improvingOverall Patient Progress: improving    Outcome Evaluation: Patient historically with poor appetite. Creatinine not improving and patient requesting to return to home. Nephrology outpatient following renal function. Reviewed discharge paperwork, educated patient on importance of dietary nutritional intake and adequate intake of fluid. Patient did not have questions or concerns when asked. Patient left with daughter to return to home.       Problem: Adult Inpatient Plan of Care  Goal: Readiness for Transition of Care  Outcome: Met     Problem: Adult Inpatient Plan of Care  Goal: Plan of Care Review  Description: The Plan of Care Review/Shift note should be completed every shift.  The Outcome Evaluation is a brief statement about your assessment that the patient is improving, declining, or no change.  This information will be displayed automatically on your shift  note.  Outcome: Adequate for Care Transition  Goal: Patient-Specific Goal (Individualized)  Description: You can add care plan individualizations to a care plan. Examples of Individualization might be:  \"Parent requests to be called daily at 9am for status\", \"I have a hard time hearing out of my right ear\", or \"Do not touch me to wake me up as it startles  me\".  Outcome: Adequate for Care Transition  Goal: Absence of Hospital-Acquired Illness or Injury  Outcome: Adequate for Care Transition  Intervention: Identify and Manage Fall Risk  Recent Flowsheet Documentation  Taken 4/9/2024 0740 by Ab Camarena, RN  Safety Promotion/Fall Prevention:   assistive device/personal items within reach   clutter free environment maintained   mobility aid in reach   lighting adjusted  Intervention: Prevent Skin Injury  Recent Flowsheet Documentation  Taken 4/9/2024 0800 by Ab Camarena, RN  Body Position: position changed independently  Skin Protection: incontinence pads " utilized  Device Skin Pressure Protection: tubing/devices free from skin contact  Intervention: Prevent Infection  Recent Flowsheet Documentation  Taken 4/9/2024 0740 by Ab Camarena RN  Infection Prevention:   equipment surfaces disinfected   environmental surveillance performed   personal protective equipment utilized   hand hygiene promoted   rest/sleep promoted  Goal: Optimal Comfort and Wellbeing  Outcome: Adequate for Care Transition     Problem: Malnutrition  Goal: Improved Nutritional Intake  4/9/2024 1336 by Ab Camarena RN  Outcome: Unable to Meet  4/9/2024 1335 by Ab Camarena RN  Outcome: Adequate for Care Transition     Problem: Fall Injury Risk  Goal: Absence of Fall and Fall-Related Injury  Intervention: Identify and Manage Contributors  Recent Flowsheet Documentation  Taken 4/9/2024 0740 by Ab Camarena RN  Medication Review/Management: medications reviewed  Intervention: Promote Injury-Free Environment  Recent Flowsheet Documentation  Taken 4/9/2024 0740 by Ab Camarena RN  Safety Promotion/Fall Prevention:   assistive device/personal items within reach   clutter free environment maintained   mobility aid in reach   lighting adjusted     Problem: Comorbidity Management  Goal: Blood Pressure in Desired Range  Intervention: Maintain Blood Pressure Management  Recent Flowsheet Documentation  Taken 4/9/2024 0740 by Ab Camarena RN  Medication Review/Management: medications reviewed     Problem: Diarrhea  Goal: Effective Diarrhea Management  Intervention: Manage Diarrhea  Recent Flowsheet Documentation  Taken 4/9/2024 0740 by Ab Camarena RN  Isolation Precautions: enteric precautions maintained  Medication Review/Management: medications reviewed     Problem: Infection  Goal: Absence of Infection Signs and Symptoms  Intervention: Prevent or Manage Infection  Recent Flowsheet Documentation  Taken 4/9/2024 0740 by Ab Camarena RN  Infection Management: aseptic technique  maintained  Isolation Precautions: enteric precautions maintained     Problem: Diarrhea  Goal: Effective Diarrhea Management  Intervention: Manage Diarrhea  Recent Flowsheet Documentation  Taken 4/9/2024 0740 by Ab Camarena, RN  Isolation Precautions: enteric precautions maintained  Medication Review/Management: medications reviewed

## 2024-04-09 NOTE — DISCHARGE SUMMARY
[facility]    Discharge Summary  Hospitalist    Date of Admission:  4/4/2024  Date of Discharge:  4/9/2024  Provider:  Martín Tate MD  Date of Service (when I last saw the patient): 04/09/24    Discharge Diagnoses   MARIPOSA on CKD Stage IIIa  Hyperkalemia   Metastatic adenocarcinoma of lung, currently on palliative immunotherapy with Keytruda 2/1/2024  S/p palliative XRT L iliac bone lytic lesion 1/2024  Recent GIB due to duodenal ulcer   CAD  Norovirus AGE  Malnutrition Status:    Severe in the context of chronic illness 4/5/24    Other medical issues:  Past Medical History:   Diagnosis Date    Cancer (H)     Chronic kidney disease, stage III (moderate) (H)     3/4/2015    Congenital renal agenesis and dysgenesis     3/4/2015,Hospitalized following over-medication with Atenolol.    Coronary atherosclerosis     3/4/2015    Disturbance of skin sensation     3/4/2015    Essential hypertension     3/4/2015    Other and unspecified hyperlipidemia     3/4/2015    Other symptoms involving nervous and musculoskeletal systems     3/4/2015    Postsurgical percutaneous transluminal coronary angioplasty status     3/4/2015       History of Present Illness   Zack Booth is an 84 year old male who presented with hyperkalemia and worsened renal function.  Please see the admission history and physical for full details.    Hospital Course   Zack Booth is a 84 year old male wit a history of metastatic lung adenocarcinoma, HTN, AF not on anticoagulation, CAD,PAD, admitted on 4/4/2024 after he presented to oncology clinic for infusion, however labs noted a potassium of 6.0and acute creatinine increased to 5.46 from baseline 1.4-1.6.  He was sent to the ED for further evaluation.  He is asymptomatic, he was shifted with insulin and given IV fluids.     MARIPOSA on CKD  Hyperkalemia  Suspect this is medication induced; possible prerenal but he reports he has been eating and drinking okay. It looks like his  spironolactone was restarted 3/30 at 100 mg daily  -discontinued spironolactone, took off of med list  -measure urine output q4  -potassium recheck this evening 1900 and 2300  -urinalysis; suspect will be abnormal given MARIPOSA, eval for casts     Metastatic adenocarcinoma of lung, currently on palliative immunotherapy with Keytruda 2/1/2024  S/p palliative XRT L iliac bone lytic lesion 1/2024  Initially diagnosed 12/2023 during admission for fall, with CTAP with lytic lesion in L iliac with soft tissue extension as well as 2 cm lung lesion in RML, RLL 1.4 cm lung nodule. Biopsy of bone with metastatic adenocarcinoma with lung primary.After initial treatment he was admitted for duodenal ulcer, though GIB was unlikely related to keytruda, and given his high PD-L1 it was recommended he pursue treatment with keytruda; presented for infusion today.  -follow up after discharge     Recent GIB due to duodenal ulcer  During admission 2/2024. No further signs of recurrent GIB. He is off of aspirin and is on protonix and was treated with clarithromycin, amoxicillin regimen for H pylori. GIB was unlikely related to keytruda.  -continue ppi  -no nsaids     CAD  ASA on hold 2/2 recent GIB.     Episode of diarrhea.  Enteric panel requested.     # Discharge Pain Plan:    - Patient currently has NO PAIN and is not being prescribed pain medications on discharge.      Significant Results and Procedures   See below     Pending Results     Unresulted Labs Ordered in the Past 30 Days of this Admission       No orders found from 3/5/2024 to 4/5/2024.            Code Status   DNR / DNI       Primary Care Physician   [facility]    GEN:  Alert, oriented x 3, appears comfortable, NAD.  HEENT:  Normocephalic/atraumatic, no scleral icterus, no nasal discharge, mouth moist.  CV:  Regular rate and rhythm, no murmur or JVD.  S1 + S2 noted, no S3 or S4.  LUNGS:  Clear to auscultation bilaterally without  rales/rhonchi/wheezing/retractions.  Symmetric chest rise on inhalation noted.    Discharge Disposition   Discharged to home    Consultations This Hospital Stay   NEPHROLOGY IP CONSULT  CARE MANAGEMENT / SOCIAL WORK IP CONSULT    Time Spent on this Encounter   I, Martín Tate MD, personally saw the patient today and spent greater than 30 minutes discharging this patient.     Discharge Orders      Follow Up ([facility])    Kidney follow up with Nephrology at [facility] Wednesday 4/17 9am with Dr. Herbert.     [facility], Nephrology  [address], [address]  [phone]     Discharge Medications   Current Discharge Medication List        CONTINUE these medications which have NOT CHANGED    Details   acetaminophen (TYLENOL) 500 MG tablet Take 1,000 mg by mouth daily as needed      cyanocobalamin (VITAMIN B-12) 1000 MCG tablet Take 1,000 mcg by mouth daily      nitroGLYcerin (NITROSTAT) 0.4 MG sublingual tablet Place 0.4 mg under the tongue every 5 minutes as needed for chest pain For chest pain place 1 tablet under the tongue every 5 minutes for 3 doses. If symptoms persist 5 minutes after 1st dose call 911.      pantoprazole (PROTONIX) 40 MG EC tablet Take 1 tablet (40 mg) by mouth 2 times daily for 90 days  Qty: 180 tablet, Refills: 0    Associated Diagnoses: Acute GI bleeding; Gastrointestinal hemorrhage associated with duodenal ulcer      senna-docusate (SENOKOT-S/PERICOLACE) 8.6-50 MG tablet Take 1 tablet by mouth 2 times daily as needed           Allergies   Allergies   Allergen Reactions    Atenolol      Other reaction(s): Bradycardia    Lisinopril      Other reaction(s): *Unknown    Statins Muscle Pain (Myalgia)     Bad dreams    Ibuprofen      Other reaction(s): Headache     Data   Most Recent 3 CBC's:  Recent Labs   Lab Test 04/09/24  0729 04/07/24  0721 04/05/24  0756   WBC 9.6 16.9* 10.5   HGB 7.7* 8.4* 8.9*   MCV 86 86 87    325 373      Most Recent 3 BMP's:  Recent Labs    Lab Test 04/09/24  0729 04/08/24  0730 04/08/24  0254 04/08/24  0221 04/08/24  0147 04/07/24  0721    138  --   --   --  139   POTASSIUM 3.8 4.1  --   --  3.7 4.8   CHLORIDE 103 102  --   --   --  106   CO2 23 25  --   --   --  19*   BUN 61.3* 64.6*  --   --   --  71.0*   CR 6.22* 6.09*  --   --   --  6.21*   ANIONGAP 17* 11  --   --   --  14   KAUR 8.0* 8.3*  --   --   --  8.4*   * 97 100*   < >  --  123*    < > = values in this interval not displayed.     Most Recent 2 LFT's:  Recent Labs   Lab Test 02/09/24  1219 02/03/24  0911   AST 10 13   ALT 6 13   ALKPHOS 76 73   BILITOTAL 0.3 <0.2     Most Recent INR's and Anticoagulation Dosing History:  Anticoagulation Dose History          Latest Ref Rng & Units 2/3/2024   Recent Dosing and Labs   INR 0.85 - 1.15 1.18      Most Recent 3 Troponin's:No lab results found.  Most Recent Cholesterol Panel:No lab results found.  Most Recent 6 Bacteria Isolates From Any Culture (See EPIC Reports for Culture Details):No lab results found.  Most Recent TSH, T4 and A1c Labs:No lab results found.  Results for orders placed or performed during the hospital encounter of 04/04/24   US Renal Complete Non-Vascular    Narrative    US RENAL COMPLETE NON-VASCULAR   4/5/2024 2:20 PM     HISTORY: Acute kidney injury    COMPARISON: CT 2/3/2024    FINDINGS:    Right Kidney: Normal size and cortical echogenicity measuring up to  10.4 cm. Multiple simple appearing cysts, no specific follow-up  recommended. No hydronephrosis.    Left Kidney: Normal size and cortical echogenicity measuring up to  10.0 cm. Multiple simple appearing cysts, no specific follow-up  recommended. No hydronephrosis.    Bladder: Prostatomegaly with compression of the bladder base.  Otherwise unremarkable.      Impression    IMPRESSION:    1. No hydronephrosis.  2. Prostatomegaly.    MAGED MEDINA MD         SYSTEM ID:  [id number]     Disclaimer: This note consists of symbols derived from keyboarding,  dictation and/or voice recognition software. As a result, there may be errors in the script that have gone undetected. Please consider this when interpreting information found in this chart.

## 2024-08-11 PROBLEM — I48.20 CHRONIC ATRIAL FIBRILLATION (H): Status: ACTIVE | Noted: 2024-01-01

## 2024-08-11 PROBLEM — K92.2 GASTROINTESTINAL HEMORRHAGE, UNSPECIFIED GASTROINTESTINAL HEMORRHAGE TYPE: Status: ACTIVE | Noted: 2024-01-01

## 2024-08-11 PROBLEM — R79.89 ELEVATED SERUM CREATININE: Status: ACTIVE | Noted: 2024-01-01

## 2024-08-11 NOTE — ED TRIAGE NOTES
BIBA; Pt came from home. Lives with wife in apartment. Pt felt anal leakage sitting in the chair so he got up to go to bathroom. Pt left a trail of dark blood with 2-3 golf ball size clots. EMS estimates 100-200cc of blood loss. Pt denies any other symptoms. C/O HA 3/10. EMS was called. BP ranged from 160-170 systolic. Pt took an aspirin today at 1700. Last BM was yesteday and was normal. Attempted IV, no success. No BS taken. Hx of Afib doesn't take a blood thinner.      Triage Assessment (Adult)       Row Name 08/11/24 0101          Triage Assessment    Airway WDL WDL        Respiratory WDL    Respiratory WDL WDL        Skin Circulation/Temperature WDL    Skin Circulation/Temperature WDL WDL        Cardiac WDL    Cardiac WDL WDL        Peripheral/Neurovascular WDL    Peripheral Neurovascular WDL WDL        Cognitive/Neuro/Behavioral WDL    Cognitive/Neuro/Behavioral WDL WDL

## 2024-08-11 NOTE — ED NOTES
[facility]  ED Nurse Handoff Report    ED Chief complaint: Rectal Bleeding  . ED Diagnosis:   Final diagnoses:   Gastrointestinal hemorrhage with rectal bleeding.    Chronic atrial fibrillation (H)   Anemia due to blood loss, acute on chronic   Elevated serum creatinine       Allergies:   Allergies   Allergen Reactions    Atenolol      Other reaction(s): Bradycardia    Lisinopril      Other reaction(s): *Unknown    Statins Muscle Pain (Myalgia)     Bad dreams    Ibuprofen      Other reaction(s): Headache       Code Status:     Activity level - Baseline/Home:  standby.  Activity Level - Current:   in bed.   Lift room needed: No.   Bariatric: No   Needed: No   Isolation: No.   Infection: Not Applicable.     Respiratory status: Room air    Vital Signs (within 30 minutes):   Vitals:    08/11/24 0111 08/11/24 0200 08/11/24 0215 08/11/24 0230   BP: (!) 160/101 (!) 163/89 (!) 159/92 (!) 164/100   Pulse: 79 82 84 80   Resp: 22      Temp: 97.6  F (36.4  C)      TempSrc: Oral      SpO2: 100% 98% 98% 98%       Cardiac Rhythm:  ,      Pain level:    Patient confused: No.   Patient Falls Risk: nonskid shoes/slippers when out of bed, arm band in place, and patient and family education.   Elimination Status: Has voided     Patient Report - Initial Complaint: rectal bleeding.   Focused Assessment: gastrointestinal hemorrhage, chronic afib, anemia, elevated serum creatinine.      Abnormal Results:   Labs Ordered and Resulted from Time of ED Arrival to Time of ED Departure   COMPREHENSIVE METABOLIC PANEL - Abnormal       Result Value    Sodium 142      Potassium 4.4      Carbon Dioxide (CO2) 23      Anion Gap 10      Urea Nitrogen 40.9 (*)     Creatinine 2.50 (*)     GFR Estimate 25 (*)     Calcium 8.3 (*)     Chloride 109 (*)     Glucose 95      Alkaline Phosphatase 47      AST 12      ALT 13      Protein Total 5.4 (*)     Albumin 3.6      Bilirubin Total 0.3     CBC WITH PLATELETS AND DIFFERENTIAL  - Abnormal    WBC Count 12.2 (*)     RBC Count 2.56 (*)     Hemoglobin 6.9 (*)     Hematocrit 23.8 (*)     MCV 93      MCH 27.0      MCHC 29.0 (*)     RDW 17.5 (*)     Platelet Count 180      % Neutrophils 86      % Lymphocytes 4      % Monocytes 8      % Eosinophils 0      % Basophils 0      % Immature Granulocytes 2      NRBCs per 100 WBC 0      Absolute Neutrophils 10.5 (*)     Absolute Lymphocytes 0.5 (*)     Absolute Monocytes 0.9      Absolute Eosinophils 0.0      Absolute Basophils 0.0      Absolute Immature Granulocytes 0.2      Absolute NRBCs 0.0     INR - Normal    INR 0.95     MAGNESIUM - Normal    Magnesium 1.9     TYPE AND SCREEN, ADULT    ABO/RH(D) O POS      Antibody Screen Negative      SPECIMEN EXPIRATION DATE [id number]     PREPARE RED BLOOD CELLS (UNIT)    Blood Component Type Red Blood Cells      Product Code [id number]      Unit Status Issued      Unit Number [id number]      CROSSMATCH Compatible      CODING SYSTEM [id number]      ISSUE DATE AND TIME [id number]      UNIT ABO/RH O+      UNIT TYPE ISBT 5100     PREPARE RED BLOOD CELLS (UNIT)   TRANSFUSE RED BLOOD CELLS (UNIT)   ABO/RH TYPE AND SCREEN        No orders to display       Treatments provided: protonix, prbc  Family Comments:   OBS brochure/video discussed/provided to patient:  N/A  ED Medications:   Medications   pantoprazole (PROTONIX) 80 mg in sodium chloride 0.9% 100 mL infusion (has no administration in time range)   pantoprazole (PROTONIX) IV push injection 40 mg (40 mg Intravenous $Given 8/11/24 0210)   acetaminophen (TYLENOL) tablet 650 mg (650 mg Oral $Given 8/11/24 0223)       Drips infusing:  Yes  For the majority of the shift this patient was Green.   Interventions performed were .    Sepsis treatment initiated: No    Cares/treatment/interventions/medications to be completed following ED care:     ED Nurse Name: Gisell Singh RN  2:48 AM

## 2024-08-11 NOTE — H&P
[facility]  Hospitalist Admission Note  Name: Zack Booth    MRN: [id number]  YOB: 1940    Age: 84 year old  Date of admission: 8/11/2024  Primary care provider: Lissy, [facility] [place]    Chief Complaint: Bloody stool    Assessment and Plan:   Acute lower GI bleed  Acute blood loss anemia on chronic anemia  PUD:  earlier tonight he felt like he was having some leaking so he went to the bathroom quickly and had an estimated 100-200 mL of red blood, with 3 golf ball sized clots.  No further bleeding since then.  Denies previous bloody stools and no melena.  Is not having any abdominal pain.  He was hospitalized in February for GI bleed requiring transfusion secondary to duodenal ulcers.  I am not sure if he is still taking aspirin or not for his previous stents and A-fib, not on anticoagulation.  He is on omeprazole daily.  Hemoglobin here is 6.9 down from 8.52 months ago (Care Everywhere) consistent with acute blood loss anemia.  Suspect lower GI bleed secondary to either hemorrhoids or diverticulosis although a rapid upper GI bleed is in the differential just less likely with stable vitals and no vomiting.  He is not sure if he has had a colonoscopy in the past and I do not see any in the chart or Care Everywhere.  Moderate sigmoid diverticulosis seen on CT last year in Care Everywhere.  -He was initiated on IV Protonix drip in the ER, continue for now but likely can stop if no further bleeding and stable hemoglobin  -Consult [facility], keep n.p.o. until seen  -Receiving 1 unit RBCs now, repeat CBC in the morning and then time hemoglobin for 10 AM and 2 PM  -If he is taking aspirin at home we will hold for now    Recent MARIPOSA on CKD stage IIIa: Baseline creatinine in March this year was 1.4.  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  He continues on amlodipine and HCTZ despite his lower extremity edema per his nephrologist recommendations.  -BMP in the morning  -Hold HCTZ with his acute blood loss  -He is on Bactrim prophylaxis every MWF which will be resumed  -Resume prednisone once dose confirmed by Pharm.D.    Leukocytosis: As above he has been on prednisone for the past few months which I think accounts for his mild leukocytosis at 12.2.  Reviewing previous labs here in Care Everywhere he tends to have a WBC count of 10-12.  Doubt acute infection at this time.    CAD  PAD  HTN  HLD  Paroxysmal A-fib  NSVT: History left circumflex stent in 2000 and bilateral iliac stents.  He has paroxysmal A-fib, currently in A-fib per EKG.  He is not on anticoagulation.  He had a duodenal ulcer with bleeding in February of this year and I am not certain if he is taking aspirin anymore or not.  He is on atorvastatin 20 mg daily.He continues on amlodipine and HCTZ despite his lower extremity edema per his nephrologist recommendations.  He had a 18 beat of wide-complex VT in the ER that was asymptomatic.  Potassium and magnesium are not low.  -Hold HCTZ with his acute blood loss  -Resume amlodipine 5 mg daily with hold parameters for low blood pressure  -Can resume atorvastatin at home  -Not sure if he is taking aspirin, but hold for now with acute bleeding  -Cardiac monitoring given the NSVT in the ER    Metastatic lung cancer: Lung cancer with metastasis to the bones.  Has had previous palliative radiation to the left iliac bone.  Received 1 dose Keytruda earlier this year and then developed renal failure thought to be secondary to this.  At this time not interested in further treatment per chart review.    Emphysema: Not on inhalers at baseline.  No wheezing.    DVT Prophylaxis: Pneumatic Compression  Devices  Code Status: DNR / DNI -consistent with previous wishes  FEN: N.p.o. until seen by GI  Discharge Dispo: Home with family  Estimated Disch Date / # of Days until Disch: Admit inpatient for suspected lower GI bleed requiring transfusion.  May need EGD or colonoscopy while here.  Anticipate 2 night hospitalization given age and comorbidities to ensure bleeding is stopped.      History of Present Illness:  Zack Booth is a 84 year old male with PMH including metastatic lung cancer no longer receiving treatment, GI bleed with duodenal ulcers 6 months ago, CAD s/p left circumflex stent, PAD s/p bilateral iliac stents, HTN, HLD, paroxysmal A-fib not on anticoagulation, anemia, osteoarthritis, gout, and recent significant MARIPOSA on CKD stage IIIa secondary to Keytruda on a prolonged prednisone taper over the last few months who presents with bloody stools.  He was sitting when he started to feel some leakage and then he went to the bathroom quickly and had an estimated 100-200 mL of red blood output with 3 golf ball size clots.  Denies any black stools.  Has not had any further bleeding that he is aware of.  He denies any nausea, vomiting, or abdominal pain.  Denies having bloody stools in the past.  He had a normal bowel movement yesterday and does not think he had to strain very hard.  Denies any firm stools.  He was hospitalized for upper GI bleed secondary to duodenal ulcers 6 months ago and has been on omeprazole since then.  His daughter does not think he takes aspirin and he is not on anticoagulation.  He also had renal failure 4 months ago thought to be from Keytruda and has been maintained on a prednisone taper for the past few months with creatinine stabilizing at 2.5.  Currently denies any chest pain, lightheadedness, shortness of breath.    History obtained from patient, patient's daughter, medical record, and from Dr. Castaneda in the emergency department.  Blood pressure 163/89, heart rate 70,  temperature 97.6  F, oxygen 97% on room air.  Initial labs show leukocytosis of 12.2, hemoglobin 6.9, platelet count 180.  INR 0.95.  Creatinine is 2.5 and BUN is 40 similar to previous levels otherwise BMP unremarkable.  EKG shows A-fib with controlled rate and PVCs.  He did have 18 beats of wide-complex tachycardia while in the ER.  He received acetaminophen and was started on Protonix infusion.  1 unit RBCs to be transfused now.  Admit inpatient with GI consultation.       Clinically Significant Risk Factors Present on Admission          # Hypocalcemia: Lowest Ca = 8.3 mg/dL in last 2 days, will monitor and replace as appropriate         # Hypertension: Noted on problem list    # Anemia: based on hgb <11  # Anemia: based on hgb <11                                  Past Medical History reviewed:  Past Medical History:   Diagnosis Date    Anemia     Chronic kidney disease, stage III     3/4/2015    Congenital renal agenesis and dysgenesis     3/4/2015,Hospitalized following over-medication with Atenolol.    Coronary artery disease     Circumflex stent    Duodenal ulcer     Essential hypertension     3/4/2015    Gastroesophageal reflux disease     Gout     Hyperlipidemia     3/4/2015    Metastatic adenocarcinoma of the lung     PAD (peripheral artery disease) (H24)     Bilateral iliac stents    Paroxysmal atrial fibrillation      Past Surgical History reviewed:  Past Surgical History:   Procedure Laterality Date    APPENDECTOMY OPEN      No Comments Provided    ARTHROPLASTY KNEE      x7 knee surgeries.    ESOPHAGOSCOPY, GASTROSCOPY, DUODENOSCOPY (EGD), COMBINED N/A 02/03/2024    Procedure: Esophagoscopy, gastroscopy, duodenoscopy, combined;  Surgeon: Kavin Duong MD;  Location: [facility] OR    HEART CATH, ANGIOPLASTY      No Comments Provided    IR LUMBAR PUNCTURE  12/07/2023    VASECTOMY      No Comments Provided     Social History reviewed:  Social History     Tobacco Use    Smoking status: Every Day     Current  packs/day: 0.25     Average packs/day: 0.3 packs/day for 70.0 years (17.5 ttl pk-yrs)     Types: Cigarettes    Smokeless tobacco: Never    Tobacco comments:     Quit smoking: -- started at about age 5 years old   Substance Use Topics    Alcohol use: No     Comment: Alcoholic Drinks/day: maybe 1 glass of wine at holiday.     Social History     Social History Narrative     - wife Cami -  55 years as of     4 children. 2nd son  in .     Family History reviewed:  Family History   Problem Relation Age of Onset    Genetic Disorder Other         Genetic,No FHx of DM     Allergies:  Allergies   Allergen Reactions    Atenolol      Other reaction(s): Bradycardia    Lisinopril      Other reaction(s): *Unknown    Statins Muscle Pain (Myalgia)     Bad dreams    Ibuprofen      Other reaction(s): Headache     Medications:  Prior to Admission medications    Medication Sig Last Dose Taking? Auth Provider Long Term End Date   amLODIPine (NORVASC) 10 MG tablet Take 10 mg by mouth daily  Yes Reported, Patient No    atorvastatin (LIPITOR) 20 MG tablet Take 20 mg by mouth daily  Yes Reported, Patient No    omeprazole (PRILOSEC) 20 MG DR capsule Take 20 mg by mouth daily  Yes Reported, Patient     spironolactone (ALDACTONE) 25 MG tablet Take 25 mg by mouth daily  Yes Reported, Patient No    cyanocobalamin (VITAMIN B-12) 1000 MCG tablet Take 1,000 mcg by mouth daily   Unknown, Entered By History     nitroGLYcerin (NITROSTAT) 0.4 MG sublingual tablet Place 0.4 mg under the tongue every 5 minutes as needed for chest pain For chest pain place 1 tablet under the tongue every 5 minutes for 3 doses. If symptoms persist 5 minutes after 1st dose call 911.   Unknown, Entered By History No    senna-docusate (SENOKOT-S/PERICOLACE) 8.6-50 MG tablet Take 1 tablet by mouth 2 times daily as needed   Unknown, Entered By History       Review of Systems:  A Comprehensive greater than 10 system review of systems was carried out.   Pertinent positives and negatives are noted above.  Otherwise negative.     Physical Exam:  Blood pressure (!) 164/100, pulse 80, temperature 97.6  F (36.4  C), temperature source Oral, resp. rate 22, SpO2 98%.  Wt Readings from Last 1 Encounters:   04/09/24 64.2 kg (141 lb 8.6 oz)     Exam:  Constitutional: Awake, NAD, appears fatigued  Eyes: sclera white,   HEENT: atraumatic, MMM  Respiratory: no respiratory distress, anterior lungs cta bilaterally, no crackles or wheeze  Cardiovascular: Irregularly, irregular rhythm, regular rate, 1/6 systolic murmur  GI: Soft, non-tender, not distended, bowel sounds present  Skin: No rash  Musculoskeletal/extremities: 2+ pedal edema bilaterally, but no significant edema at the shins  Neurologic: Alert and answering questions appropriately, follows directions.  Psychiatric: calm, cooperative, normal affect    Lab and imaging data personally reviewed:  Labs:  Recent Labs   Lab 08/11/24 0114   WBC 12.2*   HGB 6.9*   HCT 23.8*   MCV 93        Recent Labs   Lab 08/11/24  0114      POTASSIUM 4.4   CHLORIDE 109*   CO2 23   ANIONGAP 10   GLC 95   BUN 40.9*   CR 2.50*   GFRESTIMATED 25*   KAUR 8.3*   MAG 1.9   PROTTOTAL 5.4*   ALBUMIN 3.6   BILITOTAL 0.3   ALKPHOS 47   AST 12   ALT 13     Recent Labs   Lab 08/11/24 0114   INR 0.95       EKG: A-fib with rate 80s, PVC    Imaging:  No results found for this or any previous visit (from the past 24 hour(s)).    Zhao Hendricks MD  Hospitalist  [facility]

## 2024-08-11 NOTE — PHARMACY-ADMISSION MEDICATION HISTORY
Pharmacist Admission Medication History    Admission medication history is complete. The information provided in this note is only as accurate as the sources available at the time of the update.    Information Source(s): Patient, Family member (daughter and spouse - Cami), and CareEverywhere/SureScripts via in-person    Changes made to PTA medication list:  Added: Tylenol, aspirin, tramadol, prednisone  Deleted: Vitamin B-12, hydrochlorothiazide,   Changed: amlodipine    Allergies reviewed with patient and updates made in EHR: yes    Medication History Completed By: Faiza Castillo, PharmD 8/11/2024 12:28 PM    PTA Med List   Medication Sig Last Dose    acetaminophen (TYLENOL) 500 MG tablet Take 1,000 mg by mouth daily 8/10/2024    amLODIPine (NORVASC) 5 MG tablet Take 5 mg by mouth daily 8/10/2024    aspirin 81 MG EC tablet Take 81 mg by mouth daily 8/10/2024    atorvastatin (LIPITOR) 20 MG tablet Take 20 mg by mouth daily 8/10/2024    nitroGLYcerin (NITROSTAT) 0.4 MG sublingual tablet Place 0.4 mg under the tongue every 5 minutes as needed for chest pain For chest pain place 1 tablet under the tongue every 5 minutes for 3 doses. If symptoms persist 5 minutes after 1st dose call 911. Unknown at prn    omeprazole (PRILOSEC) 20 MG DR capsule Take 20 mg by mouth daily 8/10/2024    predniSONE (DELTASONE) 10 MG tablet Take 10 mg by mouth daily 8/10/2024    senna-docusate (SENOKOT-S/PERICOLACE) 8.6-50 MG tablet Take 1 tablet by mouth 2 times daily as needed 8/10/2024    sulfamethoxazole-trimethoprim (BACTRIM) 400-80 MG tablet Take 1 tablet by mouth Every Mon, Wed, Fri Morning 8/9/2024    traMADol (ULTRAM) 50 MG tablet Take 50 mg by mouth 2 times daily 8/10/2024

## 2024-08-11 NOTE — ED PROVIDER NOTES
Emergency Department Note      History of Present Illness     Chief Complaint   Rectal Bleeding    HPI   Zack Booth is a 84 year old male with a history of metastatic lung cancer, stage III CKD, CAD, hypertension, hyperlipidemia, and paroxysmal atrial fibrillation who presents to the emergency department with his daughter via EMS for evaluation of rectal bleeding. The patient reports that he was sitting and noticed some drainage coming from his rectum. When he got up to walk, he noticed blood and clots coming from his rectum. The patient's daughter that the patient presented to the ED in April and he was admitted to the hospital at the time where he had kidney failure due to volume loss and dehydration. She states that he need multiple blood transfusions in February during a different admission and was taken off his anticoagulation therapy. Patient denies any pain.    Independent Historian   Daughter as detailed above.    Review of External Notes   I reviewed ED visit note from February  -Patient had a duodenal ulcer, believed to be due to NSAID's, patient was stopped on his anticoagulants and told to stop taking ibuprofen.    Past Medical History     Medical History and Problem List   Metastatic cancer of the lung  CKD, stage III  Congenital renal agenesis and dysgenesis  CAD  Hypertension  GERD  Gout  Hyperlipidemia  Paroxysmal atrial fibrillation    Medications   Norvasc  Lipitor  Prilosec  Aldactone  Vitamin B-12  Nitrostat  Senna-docusate    Surgical History   Appendectomy  Arthroplasty knee  Esophagoscopy, gastroscopy, duodenoscopy, combined  Heart cath, angioplasty  IR lumbar puncture  Vasectomy    Physical Exam     Patient Vitals for the past 24 hrs:   BP Temp Temp src Pulse Resp SpO2   08/11/24 0245 165/95 -- -- 78 -- 98 %   08/11/24 0230 164/100 -- -- 80 -- 98 %   08/11/24 0215 159/92 -- -- 84 -- 98 %   08/11/24 0200 163/89 -- -- 82 -- 98 %   08/11/24 0111 160/101 97.6  F (36.4  C) Oral 79 22 100 %      Physical Exam  Nursing note and vitals reviewed.  Constitutional: Cooperative.   HENT:   Mouth/Throat: Mucous membranes are normal.   Cardiovascular: Normal rate, irregularly irregular rhythm and normal heart sounds.    Pulmonary/Chest: Effort normal and breath sounds normal. No respiratory distress. No wheezes. No rales.   Abdominal: Soft. Normal appearance No distension. There is no tenderness. There is no rigidity and no guarding.   Neurological: Alert.  Oriented x 3  Skin: Skin is warm and dry.   Psychiatric: Normal mood and affect.     Diagnostics     Lab Results   Labs Ordered and Resulted from Time of ED Arrival to Time of ED Departure   COMPREHENSIVE METABOLIC PANEL - Abnormal       Result Value    Sodium 142      Potassium 4.4      Carbon Dioxide (CO2) 23      Anion Gap 10      Urea Nitrogen 40.9 (*)     Creatinine 2.50 (*)     GFR Estimate 25 (*)     Calcium 8.3 (*)     Chloride 109 (*)     Glucose 95      Alkaline Phosphatase 47      AST 12      ALT 13      Protein Total 5.4 (*)     Albumin 3.6      Bilirubin Total 0.3     CBC WITH PLATELETS AND DIFFERENTIAL - Abnormal    WBC Count 12.2 (*)     RBC Count 2.56 (*)     Hemoglobin 6.9 (*)     Hematocrit 23.8 (*)     MCV 93      MCH 27.0      MCHC 29.0 (*)     RDW 17.5 (*)     Platelet Count 180      % Neutrophils 86      % Lymphocytes 4      % Monocytes 8      % Eosinophils 0      % Basophils 0      % Immature Granulocytes 2      NRBCs per 100 WBC 0      Absolute Neutrophils 10.5 (*)     Absolute Lymphocytes 0.5 (*)     Absolute Monocytes 0.9      Absolute Eosinophils 0.0      Absolute Basophils 0.0      Absolute Immature Granulocytes 0.2      Absolute NRBCs 0.0     INR - Normal    INR 0.95     MAGNESIUM - Normal    Magnesium 1.9     TYPE AND SCREEN, ADULT    ABO/RH(D) O POS      Antibody Screen Negative      SPECIMEN EXPIRATION DATE [id number]     PREPARE RED BLOOD CELLS (UNIT)    Blood Component Type Red Blood Cells      Product Code [id number]       Unit Status Ready for issue      Unit Number [id number]      CROSSMATCH Compatible      CODING SYSTEM [id number]     PREPARE RED BLOOD CELLS (UNIT)   ABO/RH TYPE AND SCREEN     Imaging   No orders to display     EKG   ECG results from 08/11/24   EKG 12 lead     Value    Systolic Blood Pressure     Diastolic Blood Pressure     Ventricular Rate 82    Atrial Rate 84    IN Interval     QRS Duration 96        QTc 439    P Axis     R AXIS 15    T Axis 108    Interpretation ECG      Atrial fibrillation with PVC  Nonspecific ST and T wave abnormality     Independent Interpretation   None    ED Course      Medications Administered   Medications   pantoprazole (PROTONIX) 80 mg in sodium chloride 0.9% 100 mL infusion (has no administration in time range)   pantoprazole (PROTONIX) IV push injection 40 mg (40 mg Intravenous $Given 8/11/24 0210)   acetaminophen (TYLENOL) tablet 650 mg (650 mg Oral $Given 8/11/24 0223)     Discussion of Management   Admitting Hospitalist, Dr. Hendricks    ED Course   ED Course as of 08/11/24 0239   Sun Aug 11, 2024   0130 I initially assessed the patient and obtained the above history and physical exam.     0148 I rechecked with the patient.   0238 I consulted with Dr. Zhao Hendricks, admitting hospitalist, about plan of care for the patient.       Additional Documentation  None    Medical Decision Making / Diagnosis     MDM   Zack Booth is a 84 year old male with history of a duodenal ulcer bleed in February at which point he was taken off anticoagulation and advised to stop NSAID therapy.  He presents with rectal bleeding today.  Without preceding melena this may be a lower GI bleed but given his history of duodenal ulcer we did start a Protonix infusion.  He is not anticoagulated at this time as noted.  He is otherwise hemodynamically stable.  Hemoglobin was down to 6.9 from recent level at 8.5.  1 unit packed red blood cells was ordered and transfusion begun in the ER.  Patient will be  admitted for gastroenterology consultation and monitoring of blood counts.  He was noted to have an 18 beat run of wide-complex tachycardia.  He was asymptomatic during this time.  EKG above shows atrial fibrillation with occasional PVC.  Will continue to monitor.  Electrolytes reassuring at this time.    Disposition   The patient was admitted to the hospital.     Diagnosis     ICD-10-CM    1. Gastrointestinal hemorrhage with rectal bleeding.   K92.2       2. Chronic atrial fibrillation (H)  I48.20       3. Anemia due to blood loss, acute on chronic  D62       4. Elevated serum creatinine  R79.89       5. CKD (chronic kidney disease)  N18.4          Scribe Disclosure:  I, Ana Paula Canas, am serving as a scribe at 1:06 AM on 8/11/2024 to document services personally performed by Joseph Castaneda MD based on my observations and the provider's statements to me.        Joseph Castaneda MD  08/11/24 0305       Joseph Castaneda MD  08/11/24 0306

## 2024-08-11 NOTE — PLAN OF CARE
"Goal Outcome Evaluation:         [facility]    ED Boarding Nurse Handoff Addendum Report:    Date/time: 8/11/2024, 5:43 PM    Activity Level: assist of 2    Fall Risk: Yes:  nonskid shoes/slippers when out of bed    Active Infusions: none    Current Meds Due: none    Current care needs: none    Oxygen requirements (liters/min and/or FiO2): 2 l    Respiratory status: Nasal cannula    Vital signs (within last 30 minutes):    Vitals:    08/11/24 1527 08/11/24 1531 08/11/24 1542 08/11/24 1557   BP: (!) 148/94  (!) 169/95    Pulse: 87 81 85 76   Resp:       Temp:       TempSrc:       SpO2: 99% 100% 100% 99%       Focused assessment within last 30 minutes:    none    ED Boarding Nurse name: Chante Bacon RN         Problem: Adult Inpatient Plan of Care  Goal: Plan of Care Review  Description: The Plan of Care Review/Shift note should be completed every shift.  The Outcome Evaluation is a brief statement about your assessment that the patient is improving, declining, or no change.  This information will be displayed automatically on your shift  note.  Outcome: Not Progressing  Goal: Patient-Specific Goal (Individualized)  Description: You can add care plan individualizations to a care plan. Examples of Individualization might be:  \"Parent requests to be called daily at 9am for status\", \"I have a hard time hearing out of my right ear\", or \"Do not touch me to wake me up as it startles  me\".  Outcome: Not Progressing  Goal: Absence of Hospital-Acquired Illness or Injury  Outcome: Not Progressing  Intervention: Identify and Manage Fall Risk  Recent Flowsheet Documentation  Taken 8/11/2024 1700 by Chante Bacon RN  Safety Promotion/Fall Prevention:   activity supervised   patient and family education   nonskid shoes/slippers when out of bed   lighting adjusted  Goal: Optimal Comfort and Wellbeing  Outcome: Not Progressing  Goal: Readiness for Transition of Care  Outcome: Not Progressing   "   Problem: Gas Exchange Impaired  Goal: Optimal Gas Exchange  Outcome: Not Progressing     Problem: Anemia  Goal: Anemia Symptom Improvement  Outcome: Not Progressing  Intervention: Monitor and Manage Anemia  Recent Flowsheet Documentation  Taken 8/11/2024 1700 by Chante Bacon, RN  Safety Promotion/Fall Prevention:   activity supervised   patient and family education   nonskid shoes/slippers when out of bed   lighting adjusted

## 2024-08-11 NOTE — PROGRESS NOTES
[facility]    Hospitalist Progress Note  Name: Zack Booth    MRN: [id number]  Provider:  Cheo Dunn DO  Date of Service: 08/11/2024    Summary of Stay: Zack Booth is a 84 year old male with a history of peptic ulcer disease in 2/2024, anemia of chronic disease, chronic kidney disease stage IIIa secondary to Keytruda, metastatic lung cancer, emphysema, coronary artery disease, hypertension, hyperlipidemia, paroxysmal atrial fibrillation no longer on anticoagulation admitted on 8/11/2024 with hematochezia/melena.  In the emergency department, the patient was found to have a blood pressure 164/100, heart rate 80, temperature 97.6  F, respiratory rate 22, SpO2 90% on room air.  Initial lab work showed hemoglobin 6.9, WBC 12.2, BUN/creatinine 40.9/2.50, INR 0.95.  The patient was started on a pantoprazole drip and transfused 1 unit of PRBC.  Gastroenterology was consulted to see the patient.    TODAY'S PLAN: Appreciate gastroenterology recommendations.  Patient with history of peptic ulcer disease, on chronic prednisone.  Patient certainly is at risk for an upper GI bleed or even another peptic ulcer.  N.p.o. pending GI evaluation.  Continue pantoprazole drip.  Trend hemoglobin Q8 and transfuse for hemoglobin less than 7.  Resume PTA prednisone of 10 mg daily.  Wife and daughter at bedside.  Patient ambulates with a cane at baseline.  Anticipate 1 to 2 days in the hospital pending GI workup.    Problem List:   Hematochezia/Melena  Hx of PUD  - Appreciate GI recommendations  - PPI drip  - NPO  - EGD in 2/2024 showed gastritis, non-bleeding duodenal ulcers, 4 cm hiatal hernia    Acute Blood Loss Anemia  Anemia of Chronic Disease  - s/p 1 unit PRBC in ED  - Hgb q8h  - Transfuse for hgb less than 7  - Daily CBC    Chronic Kidney Disease Stage 3a  - Baseline Cr = 2.5  - Developed acute kidney failure in 3/2024 secondary to Keytruda  - Hold hydrochlorothiazide  - Continue prednisone 10 mg daily  with bactrim prophylaxis    Chronic Medical Problems:  Metastatic Lung Cancer - no plans for further treatment at this time  Emphysema  Coronary Artery Disease  Hypertension  Hyperlipidemia  Paroxysmal Atrial Fibrillation - no longer on OAC    I spent 48 minutes in reviewing this patient's labs, imaging, medications, medical history.  In addition time was spent interviewing the patient, communicating with family, and medical decision making.      DVT Prophylaxis: Pneumatic Compression Devices  Code Status: No CPR- Do NOT Intubate  Diet: NPO per Anesthesia Guidelines for Procedure/Surgery Except for: Meds    Gordon Catheter: Not present    Disposition: Medically Ready for Discharge: Anticipated Tomorrow (1-2 days)  Goals to discharge include: Hemoglobin stable, GI workup complete  Family updated today: Yes      Interval History   Patient seen and examined in the emergency department.  Patient denies any abdominal pain.  He does report a headache currently.  He reports several bowel movements in the emergency department.    -Data reviewed today: I personally reviewed all new labs and imaging results over the last 24 hours.     Physical Exam   Temp: 98.3  F (36.8  C) Temp src: Oral BP: (!) 154/93 Pulse: 87   Resp: 16 SpO2: 97 % O2 Device: None (Room air)    There were no vitals filed for this visit.  Vital Signs with Ranges  Temp:  [97.6  F (36.4  C)-98.3  F (36.8  C)] 98.3  F (36.8  C)  Pulse:  [] 87  Resp:  [16-22] 16  BP: (131-188)/() 154/93  SpO2:  [90 %-100 %] 97 %  I/O last 3 completed shifts:  In: 300   Out: -     GENERAL: No apparent distress. Awake, alert, and fully oriented.  HEENT: Normocephalic, atraumatic. Extraocular movements intact.  CARDIOVASCULAR: Regular rate and rhythm without murmurs or rubs. No S3.  PULMONARY: Clear bilaterally.  GASTROINTESTINAL: Soft, non-tender, non-distended. Bowel sounds normoactive.   EXTREMITIES: No cyanosis or clubbing. 2+ edema LLE and 1+ edema  "RLE  NEUROLOGICAL: CN 2-12 grossly intact, no focal neurological deficits.  DERMATOLOGICAL: No rash, ulcer, bruising, nor jaundice.    Medications   Current Facility-Administered Medications   Medication Dose Route Frequency Provider Last Rate Last Admin    pantoprazole (PROTONIX) 80 mg in sodium chloride 0.9% 100 mL infusion  8 mg/hr Intravenous Continuous Zhao Hendricks MD 10 mL/hr at 08/11/24 1247 8 mg/hr at 08/11/24 1247     Current Facility-Administered Medications   Medication Dose Route Frequency Provider Last Rate Last Admin    amLODIPine (NORVASC) tablet 5 mg  5 mg Oral Daily Zhao Hendricks MD   5 mg at 08/11/24 0816    predniSONE (DELTASONE) tablet 10 mg  10 mg Oral Daily Cheo Dunn DO        sodium chloride (PF) 0.9% PF flush 3 mL  3 mL Intracatheter Q8H Zhao Hendricks MD        [START ON 8/12/2024] sulfamethoxazole-trimethoprim (BACTRIM) 400-80 MG per tablet 1 tablet  1 tablet Oral Q Mon Wed Fri AM Zhao Hendricks MD        traMADol (ULTRAM) tablet 50 mg  50 mg Oral BID Cheo Dunn DO         Data     Laboratory:  Recent Labs   Lab 08/11/24  1248 08/11/24  0758 08/11/24  0114   WBC  --  10.8 12.2*   HGB 7.7* 7.9* 6.9*   HCT  --  25.8* 23.8*   MCV  --  91 93   PLT  --  170 180     Recent Labs   Lab 08/11/24  0758 08/11/24  0114    142   POTASSIUM 4.0 4.4   CHLORIDE 107 109*   CO2 21* 23   ANIONGAP 13 10   GLC 91 95   BUN 39.9* 40.9*   CR 2.35* 2.50*   GFRESTIMATED 26* 25*   KAUR 8.2* 8.3*     No results for input(s): \"CULT\" in the last 168 hours.  No results found for: \"TROPI\"    Imaging:  No results found for this or any previous visit (from the past 24 hour(s)).      Cheo Dunn DO  [facility] Hospitalist  [address] [place] [address]  Securely message with [employer] (more info)  Text page via [facility] Paging/Directory   08/11/2024   "

## 2024-08-11 NOTE — CONSULTS
GASTROENTEROLOGY CONSULTATION      REASON FOR CONSULTATION: Gastrointestinal bleeding  CC/REFERRING MD: Eladio  DATE OF SERVICE: 08/11/24      ASSESSMENT/PLAN:  84-year-old male with multiple medical problems including metastatic lung cancer no longer on treatment, emphysema, GI bleed secondary to duodenal ulcers 6 months ago, coronary artery disease status post stenting, PAD status post bilateral iliac stents, hypertension, hyperlipidemia, A-fib not on anticoagulation, CKD stage III who presents with melena and episodes of hematochezia with 2 gram hemoglobin drop.  Regarding the patient's presentation this could be secondary to an upper GI bleed given his history of duodenal ulcers, but it also could be a lower source.  Given his multiple medical problems specially metastatic lung cancer and emphysema need for oxygen I would prefer to have it done under anesthesia.  He is currently stable and has not had any further bleeding and his repeat hemoglobins have been stable as well therefore we will plan on keeping him on a clear liquid diet for tonight and plan and urgent endoscopy if he were to have further bleeding if not planning endoscopy tomorrow with anesthesia for further evaluation.  If the upper endoscopy were to be negative then we could proceed with a colonoscopy if the patient wanted to.  He did think that it would be easier in his system on the patient to do the endoscopy first as he had a sore 6 months ago with a duodenal ulcers.    Recommendations  -Agree with PPI IV twice daily  -Per GI perspective could keep on a clear liquid diet  -N.p.o. after midnight  -Plan for an upper endoscopy tomorrow  -If upper endoscopy were to be negative then we will evaluate for potential colonoscopy      Thank you for this consultation.  It was a pleasure to participate in the care of this patient; please contact us with any further questions.  A total of 30 minutes was spent with this patient, 50% of which was counseling  regarding the above delineated issues.    Merrick Arreola MD  Gastroenterology  [facility]    -------------------------------------------------------------------------------------------------------------------  HPI:    84-year-old male with multiple medical problems including metastatic lung cancer no longer on treatment, emphysema, GI bleed secondary to duodenal ulcers 6 months ago, coronary artery disease status post stenting, PAD status post bilateral iliac stents, hypertension, hyperlipidemia, A-fib not on anticoagulation, CKD stage III who presents with melena and episodes of hematochezia with 2 gram hemoglobin drop.  Overall the patient states that he has been doing very well up until he felt like he was having some leakage of balance and he quickly went to the bathroom and noted that he had a small amount of red blood with some clots in his stool.  He had not had any previous episodes of melena or hematochezia before and since those episodes he has not had any further episodes of melena or hematochezia.  Currently he denies any nausea or vomiting, no diarrhea, no abdominal pain, no further episodes of bleeding.    ROS:  10pt ROS performed and otherwise negative.    PERTINENT PAST MEDICAL HISTORY:  Past Medical History:   Diagnosis Date    Anemia     Chronic kidney disease, stage III     3/4/2015    Congenital renal agenesis and dysgenesis     3/4/2015,Hospitalized following over-medication with Atenolol.    Coronary artery disease     Circumflex stent    Duodenal ulcer     Essential hypertension     3/4/2015    Gastroesophageal reflux disease     Gout     Hyperlipidemia     3/4/2015    Metastatic adenocarcinoma of the lung     PAD (peripheral artery disease) (H24)     Bilateral iliac stents    Paroxysmal atrial fibrillation        PREVIOUS ENDOSCOPY:  6 months ago he had an upper endoscopy where he was found to have duodenal ulcers    PERTINENT MEDICATIONS:  No other NSAID/anticoagulation reported by  patient.  No other OTC/herbal/supplements reported by patient.  Medications reviewed with patient today, see Medication List/Assessment for details.    SOCIAL HISTORY:    Social History     Tobacco Use    Smoking status: Every Day     Current packs/day: 0.25     Average packs/day: 0.3 packs/day for 70.0 years (17.5 ttl pk-yrs)     Types: Cigarettes    Smokeless tobacco: Never    Tobacco comments:     Quit smoking: -- started at about age 5 years old   Vaping Use    Vaping status: Never Used   Substance Use Topics    Alcohol use: No     Comment: Alcoholic Drinks/day: maybe 1 glass of wine at holiday.    Drug use: Unknown     Types: Other     Comment: Drug use: No         FAMILY HISTORY:  No colon/panc/other GI CA, no other HNPCC-related Dorothy.  No IBD/celiac, no AI/liver disease.    PHYSICAL EXAMINATION:  Vitals reviewed, AFVSS  Vitals:    08/11/24 1246 08/11/24 1301 08/11/24 1316 08/11/24 1331   BP: (!) 152/86  (!) 161/106    Pulse: 88 82 83 82   Resp:       Temp:       TempSrc:       SpO2: 94% 100% 98% 100%       Gen: aaox3, cooperative, pleasant, not dyspneic/diaphoretic, nad  HEENT: ncat, neck supple, no clad, normal op w/o ulcer/exudate, anicteric, mmm  Resp/CV unremarkable  Abd: Soft nontender nondistended positive bowel sounds, no peritoneal signs  Ext: no c/c/e  Skin: warm, perfused, no jaundice  Neuro: grossly intact, no asterixis noted    PERTINENT STUDIES:  Reviewed and compared

## 2024-08-12 NOTE — PROGRESS NOTES
[facility]    Hospitalist Progress Note  Name: Zack Booth    MRN: [id number]  Provider:  Cheo Dunn DO  Date of Service: 08/12/2024    Summary of Stay: Zack Booth is a 84 year old male with a history of peptic ulcer disease in 2/2024, anemia of chronic disease, chronic kidney disease stage IIIa secondary to Keytruda, metastatic lung cancer, emphysema, coronary artery disease, hypertension, hyperlipidemia, paroxysmal atrial fibrillation no longer on anticoagulation admitted on 8/11/2024 with hematochezia/melena.  In the emergency department, the patient was found to have a blood pressure 164/100, heart rate 80, temperature 97.6  F, respiratory rate 22, SpO2 90% on room air.  Initial lab work showed hemoglobin 6.9, WBC 12.2, BUN/creatinine 40.9/2.50, INR 0.95.  The patient was started on a pantoprazole drip and transfused 1 unit of PRBC.  Gastroenterology was consulted to see the patient.     TODAY'S PLAN: Appreciate gastroenterology recommendations.  Hemoglobin slight drop overnight to 7.1 this morning.  Continue every 8 hemoglobin checks and transfuse for hemoglobin less than 7.  Patient denies any symptoms of anemia, but does appear quite stoic.  Plan is for endoscopy today and patient is NPO.  Continue pantoprazole drip.  If endoscopy completed and source of bleeding identified and addressed, and hemoglobin stable, anticipate discharge home tomorrow.  Contacted patient's son and daughter-in-law Marlen via phone.  Voicemail was left.  ADDENDUM:  Discussed with GI.  Signs of edema in duodenum but no overt findings that could be source of bleeding.  Will monitor hgb overnight and for any further signs of bleeding.  Will discuss with pt and family tomorrow whether or not they would want to proceed with a colonoscopy.  If hgb continues to decline, could consider bleeding scan as well.  Pt would be a poor surgical candidate given his chronic problems.    Problem List:  See anticoagulation flow sheet.   Hematochezia/Melena  Hx of PUD  - Appreciate GI recommendations  - PPI drip  - NPO  - EGD in 2/2024 showed gastritis, non-bleeding duodenal ulcers, 4 cm hiatal hernia     Acute Blood Loss Anemia  Anemia of Chronic Disease  - s/p 1 unit PRBC in ED  - Hgb q8h  - Transfuse for hgb less than 7  - Daily CBC     Chronic Kidney Disease Stage 3a  - Baseline Cr = 2.5  - Developed acute kidney failure in 3/2024 secondary to Keytruda  - Hold hydrochlorothiazide  - Continue prednisone 10 mg daily with bactrim prophylaxis     Chronic Medical Problems:  Metastatic Lung Cancer - no plans for further treatment at this time  Emphysema  Coronary Artery Disease  Hypertension  Hyperlipidemia  Paroxysmal Atrial Fibrillation - no longer on OAC    I spent 47 minutes in reviewing this patient's labs, imaging, medications, medical history.  In addition time was spent interviewing the patient, communicating with family, and medical decision making.  Time was also spent reviewing notes of GI.    DVT Prophylaxis: Pneumatic Compression Devices  Code Status: No CPR- Do NOT Intubate  Diet: NPO per Anesthesia Guidelines for Procedure/Surgery Except for: Meds    Gordon Catheter: Not present    Disposition: Medically Ready for Discharge: Anticipated Tomorrow  Goals to discharge include: hgb stable, GI work up complete  Family updated today:  Left voicemail for son     Interval History   Pt seen and examined.  Pt denies any cp/tightness, lightheadedness or dizziness.  Denies any further bowel movements.    -Data reviewed today: I personally reviewed all new labs and imaging results over the last 24 hours.     Physical Exam   Temp: 97.8  F (36.6  C) Temp src: Temporal BP: 137/80 Pulse: 84   Resp: 16 SpO2: 98 % O2 Device: None (Room air)    Vitals:    08/11/24 2300   Weight: 68.7 kg (151 lb 5.8 oz)     Vital Signs with Ranges  Temp:  [97.7  F (36.5  C)-99.8  F (37.7  C)] 97.8  F (36.6  C)  Pulse:  [76-96] 84  Resp:  [16-22] 16  BP: (135-169)/()  137/80  SpO2:  [91 %-100 %] 98 %  No intake/output data recorded.    GENERAL: No apparent distress. Awake, alert, and fully oriented.  HEENT: Normocephalic, atraumatic. Extraocular movements intact.  CARDIOVASCULAR: Regular rate and rhythm without murmurs or rubs. No S3.  PULMONARY: Clear bilaterally.  GASTROINTESTINAL: Soft, non-tender, non-distended. Bowel sounds normoactive.   EXTREMITIES: No cyanosis or clubbing. No edema.  NEUROLOGICAL: CN 2-12 grossly intact, no focal neurological deficits.  DERMATOLOGICAL: No rash, ulcer, +bruising, nor jaundice.    Medications   Current Facility-Administered Medications   Medication Dose Route Frequency Provider Last Rate Last Admin    pantoprazole (PROTONIX) 80 mg in sodium chloride 0.9% 100 mL infusion  8 mg/hr Intravenous Continuous Zhao Hendricks MD 10 mL/hr at 08/12/24 0858 8 mg/hr at 08/12/24 0858     Current Facility-Administered Medications   Medication Dose Route Frequency Provider Last Rate Last Admin    amLODIPine (NORVASC) tablet 5 mg  5 mg Oral Daily Zhao Hendricks MD   5 mg at 08/12/24 0857    predniSONE (DELTASONE) tablet 10 mg  10 mg Oral Daily Cheo Dunn DO   10 mg at 08/12/24 0857    sodium chloride (PF) 0.9% PF flush 3 mL  3 mL Intracatheter Q8H Zhao Hendricks MD   3 mL at 08/12/24 0605    sulfamethoxazole-trimethoprim (BACTRIM) 400-80 MG per tablet 1 tablet  1 tablet Oral Q Mon Wed Fri AM Zhao Hendricks MD   1 tablet at 08/12/24 0916    traMADol (ULTRAM) tablet 50 mg  50 mg Oral BID Cheo Dunn DO   50 mg at 08/12/24 0857     Data     Laboratory:  Recent Labs   Lab 08/12/24  0617 08/11/24  2207 08/11/24  1544 08/11/24  1248 08/11/24  0758 08/11/24  0114   WBC  --   --   --   --  10.8 12.2*   HGB 7.1* 7.8* 7.5*   < > 7.9* 6.9*   HCT  --   --   --   --  25.8* 23.8*   MCV  --   --   --   --  91 93   PLT  --   --   --   --  170 180    < > = values in this interval not displayed.     Recent Labs   Lab  "08/12/24  0826 08/12/24  0229 08/11/24  0758 08/11/24  0114   NA  --   --  141 142   POTASSIUM  --   --  4.0 4.4   CHLORIDE  --   --  107 109*   CO2  --   --  21* 23   ANIONGAP  --   --  13 10   * 80 91 95   BUN  --   --  39.9* 40.9*   CR  --   --  2.35* 2.50*   GFRESTIMATED  --   --  26* 25*   KAUR  --   --  8.2* 8.3*     No results for input(s): \"CULT\" in the last 168 hours.  No results found for: \"TROPI\"    Imaging:  No results found for this or any previous visit (from the past 24 hour(s)).      Cheo Dunn DO  [facility] Hospitalist  [address] [place] [address]  Securely message with [employer] (more info)  Text page via [employer] Paging/Directory   08/12/2024   "

## 2024-08-12 NOTE — PLAN OF CARE
"Patient AxO x3, disoriented to time. No complaints of pain. All vital signs stable on room air.  Patient NPO for procedure. No BM this shift, no evidence of bleeding from rectum. Urinary incontinence x1. Edema to bilateral lower extremities, Scattered bruising to bilateral upper extremities. 2 PIV's: left saline locked, right infusing Protonix. Patient went down for an EGD procedure around 1115.     Patient returned from EGD around 1410. New skin tear to right AC where PIV was. Needed to have that IV pulled per report. Connected to capnography on return. On RA. Vitals stable.     Goal Outcome Evaluation:      Plan of Care Reviewed With: patient    Overall Patient Progress: no changeOverall Patient Progress: no change      Problem: Adult Inpatient Plan of Care  Goal: Plan of Care Review  Description: The Plan of Care Review/Shift note should be completed every shift.  The Outcome Evaluation is a brief statement about your assessment that the patient is improving, declining, or no change.  This information will be displayed automatically on your shift  note.  Outcome: Progressing  Flowsheets (Taken 8/12/2024 1158)  Plan of Care Reviewed With: patient  Overall Patient Progress: no change  Goal: Patient-Specific Goal (Individualized)  Description: You can add care plan individualizations to a care plan. Examples of Individualization might be:  \"Parent requests to be called daily at 9am for status\", \"I have a hard time hearing out of my right ear\", or \"Do not touch me to wake me up as it startles  me\".  Outcome: Progressing  Goal: Absence of Hospital-Acquired Illness or Injury  Outcome: Progressing  Intervention: Identify and Manage Fall Risk  Recent Flowsheet Documentation  Taken 8/12/2024 0900 by Ghazala Maxwell, FLORESITA  Safety Promotion/Fall Prevention:   activity supervised   assistive device/personal items within reach   clutter free environment maintained   increase visualization of patient   mobility aid in reach   " nonskid shoes/slippers when out of bed   patient and family education   room near nurse's station   room organization consistent   safety round/check completed   supervised activity  Intervention: Prevent Skin Injury  Recent Flowsheet Documentation  Taken 8/12/2024 0900 by Ghazala Maxwell RN  Body Position:   turned   supine   supine, legs elevated  Skin Protection:   adhesive use limited   incontinence pads utilized  Device Skin Pressure Protection:   tubing/devices free from skin contact   absorbent pad utilized/changed   adhesive use limited  Intervention: Prevent and Manage VTE (Venous Thromboembolism) Risk  Recent Flowsheet Documentation  Taken 8/12/2024 0900 by Ghazala Maxwell RN  VTE Prevention/Management: SCDs off (sequential compression devices)  Intervention: Prevent Infection  Recent Flowsheet Documentation  Taken 8/12/2024 0900 by Ghazala Maxwell RN  Infection Prevention:   hand hygiene promoted   personal protective equipment utilized   rest/sleep promoted   single patient room provided  Goal: Optimal Comfort and Wellbeing  Outcome: Progressing  Goal: Readiness for Transition of Care  Outcome: Progressing     Problem: Gas Exchange Impaired  Goal: Optimal Gas Exchange  Outcome: Progressing  Intervention: Optimize Oxygenation and Ventilation  Recent Flowsheet Documentation  Taken 8/12/2024 0900 by Ghazala Maxwell RN  Head of Bed (HOB) Positioning: HOB at 20-30 degrees     Problem: Anemia  Goal: Anemia Symptom Improvement  Outcome: Progressing  Intervention: Monitor and Manage Anemia  Recent Flowsheet Documentation  Taken 8/12/2024 0900 by Ghazala Maxwell RN  Safety Promotion/Fall Prevention:   activity supervised   assistive device/personal items within reach   clutter free environment maintained   increase visualization of patient   mobility aid in reach   nonskid shoes/slippers when out of bed   patient and family education   room near nurse's station   room organization consistent   safety round/check  completed   supervised activity     Problem: Pain Acute  Goal: Optimal Pain Control and Function  Outcome: Progressing  Intervention: Prevent or Manage Pain  Recent Flowsheet Documentation  Taken 8/12/2024 0900 by Ghazala Maxwell RN  Medication Review/Management: medications reviewed  Intervention: Optimize Psychosocial Wellbeing  Recent Flowsheet Documentation  Taken 8/12/2024 0900 by Ghazala Maxwell, RN  Supportive Measures:   active listening utilized   decision-making supported   problem-solving facilitated   self-care encouraged

## 2024-08-12 NOTE — PLAN OF CARE
"[facility]    ED Boarding Nurse Handoff Addendum Report:    Date/time: 8/11/2024, 10:52 PM    Activity Level: assist of 1    Fall Risk: Yes:  room door open    Active Infusions: yes    Current Meds Due: n/a    Current care needs: Incontinence    Oxygen requirements (liters/min and/or FiO2): n/a    Respiratory status: Room air    Vital signs (within last 30 minutes):    Vitals:    08/11/24 1557 08/11/24 1945 08/11/24 1949 08/11/24 2231   BP:   (!) 151/94 (!) 139/106   BP Location:   Right arm    Pulse: 76      Resp:   18    Temp:   97.7  F (36.5  C)    TempSrc:   Oral    SpO2: 99%      Height:  1.727 m (5' 8\")         Focused assessment within last 30 minutes:    Alert and oriented. On RA.IVF infusing. Incontinent of bladder.     ED Boarding Nurse name: Moni Thompson RN   Problem: Adult Inpatient Plan of Care  Goal: Plan of Care Review  Description: The Plan of Care Review/Shift note should be completed every shift.  The Outcome Evaluation is a brief statement about your assessment that the patient is improving, declining, or no change.  This information will be displayed automatically on your shift  note.  Outcome: Progressing  Flowsheets (Taken 8/11/2024 2250)  Outcome Evaluation: Alert and oriented. On RA.IVF infusing. Incontinent of bladder.  Plan of Care Reviewed With: patient  Overall Patient Progress: improving  Goal: Patient-Specific Goal (Individualized)  Description: You can add care plan individualizations to a care plan. Examples of Individualization might be:  \"Parent requests to be called daily at 9am for status\", \"I have a hard time hearing out of my right ear\", or \"Do not touch me to wake me up as it startles  me\".  Outcome: Progressing  Goal: Absence of Hospital-Acquired Illness or Injury  Outcome: Progressing  Intervention: Identify and Manage Fall Risk  Recent Flowsheet Documentation  Taken 8/11/2024 1951 by Moni Avila RN  Safety Promotion/Fall " Prevention:   activity supervised   clutter free environment maintained   increased rounding and observation   increase visualization of patient   patient and family education   room near nurse's station   safety round/check completed  Intervention: Prevent Skin Injury  Recent Flowsheet Documentation  Taken 8/11/2024 1951 by Moni Avila, RN  Body Position:   turned   right  Skin Protection:   adhesive use limited   incontinence pads utilized  Device Skin Pressure Protection: tubing/devices free from skin contact  Intervention: Prevent and Manage VTE (Venous Thromboembolism) Risk  Recent Flowsheet Documentation  Taken 8/11/2024 1951 by Moni Avila, RN  VTE Prevention/Management: SCDs off (sequential compression devices)  Intervention: Prevent Infection  Recent Flowsheet Documentation  Taken 8/11/2024 1951 by Moni Avila, RN  Infection Prevention:   environmental surveillance performed   equipment surfaces disinfected   hand hygiene promoted   rest/sleep promoted  Goal: Optimal Comfort and Wellbeing  Outcome: Progressing  Intervention: Monitor Pain and Promote Comfort  Recent Flowsheet Documentation  Taken 8/11/2024 1949 by Moni Avila, RN  Pain Management Interventions: declines  Goal: Readiness for Transition of Care  Outcome: Progressing     Problem: Gas Exchange Impaired  Goal: Optimal Gas Exchange  Outcome: Progressing  Intervention: Optimize Oxygenation and Ventilation  Recent Flowsheet Documentation  Taken 8/11/2024 1951 by Moni Avila, RN  Head of Bed (HOB) Positioning: HOB at 20 degrees     Problem: Anemia  Goal: Anemia Symptom Improvement  Outcome: Progressing  Intervention: Monitor and Manage Anemia  Recent Flowsheet Documentation  Taken 8/11/2024 1951 by Moni Avila, RN  Safety Promotion/Fall Prevention:   activity supervised   clutter free environment maintained   increased rounding and observation   increase visualization of patient   patient  and family education   room near nurse's station   safety round/check completed     Problem: Pain Acute  Goal: Optimal Pain Control and Function  Outcome: Progressing  Intervention: Develop Pain Management Plan  Recent Flowsheet Documentation  Taken 8/11/2024 1949 by Moni Avila, RN  Pain Management Interventions: declines  Intervention: Prevent or Manage Pain  Recent Flowsheet Documentation  Taken 8/11/2024 1951 by Moni Avila, RN  Medication Review/Management: medications reviewed   Goal Outcome Evaluation:      Plan of Care Reviewed With: patient    Overall Patient Progress: improvingOverall Patient Progress: improving    Outcome Evaluation: Alert and oriented. On RA.IVF infusing. Incontinent of bladder.

## 2024-08-12 NOTE — ANESTHESIA POSTPROCEDURE EVALUATION
Patient: Zack Booth    Procedure: Procedure(s):  esophagogastroduodenoscopy       Anesthesia Type:  MAC    Note:  Disposition: Outpatient   Postop Pain Control: Uneventful            Sign Out: Well controlled pain   PONV: No   Neuro/Psych: Uneventful            Sign Out: Acceptable/Baseline neuro status   Airway/Respiratory: Uneventful            Sign Out: Acceptable/Baseline resp. status   CV/Hemodynamics: Uneventful            Sign Out: Acceptable CV status; No obvious hypovolemia; No obvious fluid overload   Other NRE: NONE   DID A NON-ROUTINE EVENT OCCUR? No           Last vitals:  Vitals Value Taken Time   /80 08/12/24 1315   Temp 97  F (36.1  C) 08/12/24 1238   Pulse 76 08/12/24 1315   Resp 15 08/12/24 1300   SpO2 95 % 08/12/24 1326   Vitals shown include unfiled device data.    Electronically Signed By: Mirza Guerrero MD  August 12, 2024  1:28 PM

## 2024-08-12 NOTE — ANESTHESIA CARE TRANSFER NOTE
Patient: Zack Booth    Procedure: Procedure(s):  esophagogastroduodenoscopy       Diagnosis: GI bleed [K92.2]  Diagnosis Additional Information: No value filed.    Anesthesia Type:   MAC     Note:    Oropharynx: oropharynx clear of all foreign objects  Level of Consciousness: drowsy  Oxygen Supplementation: room air    Independent Airway: airway patency satisfactory and stable  Dentition: dentition unchanged  Vital Signs Stable: post-procedure vital signs reviewed and stable  Report to RN Given: handoff report given  Patient transferred to: Phase II    Handoff Report: Identifed the Patient, Identified the Reponsible Provider, Reviewed the pertinent medical history, Discussed the surgical course, Reviewed Intra-OP anesthesia mangement and issues during anesthesia, Set expectations for post-procedure period and Allowed opportunity for questions and acknowledgement of understanding      Vitals:  Vitals Value Taken Time   /67 08/12/24 1245   Temp 97  F (36.1  C) 08/12/24 1238   Pulse 83 08/12/24 1245   Resp     SpO2 98 % 08/12/24 1247   Vitals shown include unfiled device data.    Electronically Signed By: ANGEL Hinton CRNA  August 12, 2024  12:48 PM

## 2024-08-12 NOTE — ANESTHESIA PREPROCEDURE EVALUATION
Anesthesia Pre-Procedure Evaluation    Patient: Zack Booth   MRN: [id number] : 1940        Procedure : Procedure(s):  Esophagoscopy, gastroscopy, duodenoscopy (EGD), combined          Past Medical History:   Diagnosis Date    Anemia     Chronic kidney disease, stage III     3/4/2015    Congenital renal agenesis and dysgenesis     3/4/2015,Hospitalized following over-medication with Atenolol.    Coronary artery disease     Circumflex stent    Duodenal ulcer     Essential hypertension     3/4/2015    Gastroesophageal reflux disease     Gout     Hyperlipidemia     3/4/2015    Metastatic adenocarcinoma of the lung     PAD (peripheral artery disease) (H24)     Bilateral iliac stents    Paroxysmal atrial fibrillation       Past Surgical History:   Procedure Laterality Date    APPENDECTOMY OPEN      No Comments Provided    ARTHROPLASTY KNEE      x7 knee surgeries.    ESOPHAGOSCOPY, GASTROSCOPY, DUODENOSCOPY (EGD), COMBINED N/A 2024    Procedure: Esophagoscopy, gastroscopy, duodenoscopy, combined;  Surgeon: Kavin Duong MD;  Location: [facility] OR    HEART CATH, ANGIOPLASTY      No Comments Provided    IR LUMBAR PUNCTURE  2023    VASECTOMY      No Comments Provided      Allergies   Allergen Reactions    Atenolol      Other reaction(s): Bradycardia    Lisinopril      Other reaction(s): *Unknown    Statins Muscle Pain (Myalgia)     Bad dreams    Ibuprofen      Other reaction(s): Headache      Social History     Tobacco Use    Smoking status: Every Day     Current packs/day: 0.25     Average packs/day: 0.3 packs/day for 70.0 years (17.5 ttl pk-yrs)     Types: Cigarettes    Smokeless tobacco: Never    Tobacco comments:     Quit smoking: -- started at about age 5 years old   Substance Use Topics    Alcohol use: No     Comment: Alcoholic Drinks/day: maybe 1 glass of wine at holiday.      Wt Readings from Last 1 Encounters:   24 68.7 kg (151 lb 5.8 oz)        Anesthesia Evaluation            ROS/MED  HX  ENT/Pulmonary:       Neurologic:       Cardiovascular: Comment: PAD (peripheral artery disease) (H24)   Bilateral iliac stents      (+) Dyslipidemia hypertension- -  CAD -  - stent-                                      METS/Exercise Tolerance:     Hematologic: Comments: Lab Test        08/12/24 08/11/24 08/11/24 08/11/24 08/11/24 08/11/24 04/09/24 02/03/24 02/03/24                       0617          2207          1544          1248          0758          0114          0729          1104          0911          WBC           --           --           --           --          10.8         12.2*        9.6            < >        15.1*         HGB          7.1*         7.8*         7.5*           < >        7.9*         6.9*         7.7*           < >        9.2*          MCV           --           --           --           --          91           93           86             < >        97            PLT           --           --           --           --          170          180          292            < >        258           INR           --           --           --           --           --          0.95          --           --          1.18*          < > = values in this interval not displayed.                  Lab Test        08/12/24 08/12/24 08/11/24 08/11/24 04/09/24                       0826          0229          0758          0114          0729          NA            --           --          141          142          143           POTASSIUM     --           --          4.0          4.4          3.8           CHLORIDE      --           --          107          109*         103           CO2           --           --          21*          23           23            BUN           --           --          39.9*        40.9*        61.3*         CR            --           --          2.35*        2.50*        6.22*         ANIONGAP      --           --         "  13           10           17*           KAUR           --           --          8.2*         8.3*         8.0*          GLC          103*         80           91           95           102*              (+)      anemia,          Musculoskeletal:       GI/Hepatic:     (+) GERD, Asymptomatic on medication,                  Renal/Genitourinary: Comment: Congenital renal agenesis and dysgenesis   3/4/2015,Hospitalized following over-medication with Atenolol.      (+) renal disease, type: CRI,            Endo:       Psychiatric/Substance Use:       Infectious Disease:       Malignancy:   (+) Malignancy, History of Lung.  Lung CA Active status post.      Other:            Physical Exam    Airway        Mallampati: II   TM distance: > 3 FB   Neck ROM: full   Mouth opening: > 3 cm    Respiratory Devices and Support         Dental       (+) Minor Abnormalities - some fillings, tiny chips      Cardiovascular   cardiovascular exam normal          Pulmonary   pulmonary exam normal                OUTSIDE LABS:  CBC:   Lab Results   Component Value Date    WBC 10.8 08/11/2024    WBC 12.2 (H) 08/11/2024    HGB 7.1 (L) 08/12/2024    HGB 7.8 (L) 08/11/2024    HCT 25.8 (L) 08/11/2024    HCT 23.8 (L) 08/11/2024     08/11/2024     08/11/2024     BMP:   Lab Results   Component Value Date     08/11/2024     08/11/2024    POTASSIUM 4.0 08/11/2024    POTASSIUM 4.4 08/11/2024    CHLORIDE 107 08/11/2024    CHLORIDE 109 (H) 08/11/2024    CO2 21 (L) 08/11/2024    CO2 23 08/11/2024    BUN 39.9 (H) 08/11/2024    BUN 40.9 (H) 08/11/2024    CR 2.35 (H) 08/11/2024    CR 2.50 (H) 08/11/2024     (H) 08/12/2024    GLC 80 08/12/2024     COAGS:   Lab Results   Component Value Date    PTT 26 02/03/2024    INR 0.95 08/11/2024     POC: No results found for: \"BGM\", \"HCG\", \"HCGS\"  HEPATIC:   Lab Results   Component Value Date    ALBUMIN 3.6 08/11/2024    PROTTOTAL 5.4 (L) 08/11/2024    ALT 13 08/11/2024    AST 12 08/11/2024 "    ALKPHOS 47 08/11/2024    BILITOTAL 0.3 08/11/2024     OTHER:   Lab Results   Component Value Date    LACT 1.0 02/09/2024    KAUR 8.2 (L) 08/11/2024    PHOS 5.7 (H) 04/07/2024    MAG 1.9 08/11/2024       Anesthesia Plan    ASA Status:  3    NPO Status:  NPO Appropriate    Anesthesia Type: MAC.     - Reason for MAC: immobility needed   Induction: Propofol.   Maintenance: Balanced.        Consents    Anesthesia Plan(s) and associated risks, benefits, and realistic alternatives discussed. Questions answered and patient/representative(s) expressed understanding.     - Discussed:     - Discussed with:  Patient      - Extended Intubation/Ventilatory Support Discussed: No.      - Patient is DNR/DNI Status: No     Use of blood products discussed: No .     Postoperative Care    Pain management: IV analgesics.   PONV prophylaxis: Dexamethasone or Solumedrol, Ondansetron (or other 5HT-3)     Comments:               Mirza Guerrero MD    I have reviewed the pertinent notes and labs in the chart from the past 30 days and (re)examined the patient.  Any updates or changes from those notes are reflected in this note.

## 2024-08-12 NOTE — PLAN OF CARE
"Goal Outcome Evaluation:      Plan of Care Reviewed With: patient, child    Overall Patient Progress: no changeOverall Patient Progress: no change    Outcome Evaluation: Discharge home when medically stable    0782-2056  Pt lethargic upon arrival from EGD, slept between cares  Skin tear further torn in Endo per report - blisters appeared from mepilex adhesive, wound dressed with non adherend, dry gauze and wrapped with kerlix  No appetite - tolerated jello  Family at bedside     *awaiting PIV placement, 1 attempted from this writer, gavin de anda, BUE severely bruised and frail with limited options for placement     Problem: Adult Inpatient Plan of Care  Goal: Plan of Care Review  Description: The Plan of Care Review/Shift note should be completed every shift.  The Outcome Evaluation is a brief statement about your assessment that the patient is improving, declining, or no change.  This information will be displayed automatically on your shift  note.  Outcome: Not Progressing  Flowsheets (Taken 8/12/2024 1851)  Outcome Evaluation: Discharge home when medically stable  Plan of Care Reviewed With:   patient   child  Overall Patient Progress: no change  Goal: Patient-Specific Goal (Individualized)  Description: You can add care plan individualizations to a care plan. Examples of Individualization might be:  \"Parent requests to be called daily at 9am for status\", \"I have a hard time hearing out of my right ear\", or \"Do not touch me to wake me up as it startles  me\".  Outcome: Not Progressing  Goal: Absence of Hospital-Acquired Illness or Injury  Outcome: Not Progressing  Intervention: Identify and Manage Fall Risk  Recent Flowsheet Documentation  Taken 8/12/2024 1600 by Reyes O'Connor, Bridget M, RN  Safety Promotion/Fall Prevention:   activity supervised   assistive device/personal items within reach   clutter free environment maintained   increased rounding and observation   lighting adjusted   mobility aid in reach   " nonskid shoes/slippers when out of bed   room organization consistent   safety round/check completed   supervised activity  Intervention: Prevent Skin Injury  Recent Flowsheet Documentation  Taken 8/12/2024 1851 by Reyes O'Connor, Bridget M, RN  Body Position:   turned   left   right  Taken 8/12/2024 1600 by Reyes O'Connor, Bridget M, RN  Body Position: position maintained  Intervention: Prevent and Manage VTE (Venous Thromboembolism) Risk  Recent Flowsheet Documentation  Taken 8/12/2024 1600 by Reyes O'Connor, Bridget M, RN  VTE Prevention/Management: SCDs off (sequential compression devices)  Goal: Optimal Comfort and Wellbeing  Outcome: Not Progressing  Goal: Readiness for Transition of Care  Outcome: Not Progressing

## 2024-08-12 NOTE — PLAN OF CARE
"Goal Outcome Evaluation:      Plan of Care Reviewed With: patient    Overall Patient Progress: improvingOverall Patient Progress: improving    Outcome Evaluation: A/O, A1cane,paul UE bruisings, LE- edema, continues on Protonix iv.x1 bloody stools,hgb 7.8q8 motoring,Tele- Afib.Plan for Endo today. No other concerns will continue to monitor.      Problem: Adult Inpatient Plan of Care  Goal: Plan of Care Review  Description: The Plan of Care Review/Shift note should be completed every shift.  The Outcome Evaluation is a brief statement about your assessment that the patient is improving, declining, or no change.  This information will be displayed automatically on your shift  note.  Outcome: Progressing  Flowsheets (Taken 8/12/2024 0622)  Outcome Evaluation: A/O, A1cane,paul UE bruisings, LE- edema, continues on Protonix iv.x1 bloody stools,hgb 7.8q8 motoring,Tele- Afib.Plan for Endo today. No other concerns will continue to monitor.  Plan of Care Reviewed With: patient  Overall Patient Progress: improving  Goal: Patient-Specific Goal (Individualized)  Description: You can add care plan individualizations to a care plan. Examples of Individualization might be:  \"Parent requests to be called daily at 9am for status\", \"I have a hard time hearing out of my right ear\", or \"Do not touch me to wake me up as it startles  me\".  Outcome: Progressing  Goal: Absence of Hospital-Acquired Illness or Injury  Outcome: Progressing  Intervention: Identify and Manage Fall Risk  Recent Flowsheet Documentation  Taken 8/12/2024 0037 by Terence Camp, RN  Safety Promotion/Fall Prevention:   activity supervised   clutter free environment maintained   increased rounding and observation   increase visualization of patient   patient and family education   room near nurse's station   safety round/check completed  Intervention: Prevent Skin Injury  Recent Flowsheet Documentation  Taken 8/12/2024 0037 by Terence Camp, RN  Skin Protection:   " adhesive use limited   incontinence pads utilized  Device Skin Pressure Protection: tubing/devices free from skin contact  Taken 8/11/2024 2254 by Terence Camp RN  Body Position:   turned   right  Intervention: Prevent and Manage VTE (Venous Thromboembolism) Risk  Recent Flowsheet Documentation  Taken 8/12/2024 0037 by Terence Camp RN  VTE Prevention/Management: SCDs off (sequential compression devices)  Intervention: Prevent Infection  Recent Flowsheet Documentation  Taken 8/12/2024 0037 by Terence Camp RN  Infection Prevention:   environmental surveillance performed   equipment surfaces disinfected   hand hygiene promoted   rest/sleep promoted  Goal: Optimal Comfort and Wellbeing  Outcome: Progressing  Goal: Readiness for Transition of Care  Outcome: Progressing  Intervention: Mutually Develop Transition Plan  Recent Flowsheet Documentation  Taken 8/11/2024 2200 by Terence Camp RN  Equipment Currently Used at Home: cane, straight     Problem: Gas Exchange Impaired  Goal: Optimal Gas Exchange  Outcome: Progressing  Intervention: Optimize Oxygenation and Ventilation  Recent Flowsheet Documentation  Taken 8/11/2024 2254 by Terence Camp RN  Head of Bed (HOB) Positioning: HOB at 20 degrees     Problem: Anemia  Goal: Anemia Symptom Improvement  Outcome: Progressing  Intervention: Monitor and Manage Anemia  Recent Flowsheet Documentation  Taken 8/12/2024 0037 by Terence Camp RN  Safety Promotion/Fall Prevention:   activity supervised   clutter free environment maintained   increased rounding and observation   increase visualization of patient   patient and family education   room near nurse's station   safety round/check completed     Problem: Pain Acute  Goal: Optimal Pain Control and Function  Outcome: Progressing  Intervention: Prevent or Manage Pain  Recent Flowsheet Documentation  Taken 8/12/2024 0037 by Terence Camp RN  Medication Review/Management: medications reviewed

## 2024-08-13 NOTE — PLAN OF CARE
"Patient A&OX4. Vitals stable on room air. Tele showed afib in the 90's per tele tech. Complained of a headache this morning, initially denied pain intervention but later requested Tylenol and stated this helped. No BM this shift, no evidence of bleeding from the rectum. Progressed from clear liquid to regular diet this morning but refusing meals. Extensive bruising to BUE. Skin tear to RUE covered with Kerlix C/DI.  Edema to BLE.  Patient is discharging home this afternoon. Discharge instructions reviewed with patient and son, both verbalized understanding, no questions at this time. Medications given to patient in sealed bag. Patient discharged around 1325.      Goal Outcome Evaluation:      Plan of Care Reviewed With: patient    Overall Patient Progress: no changeOverall Patient Progress: no change      Problem: Adult Inpatient Plan of Care  Goal: Plan of Care Review  Description: The Plan of Care Review/Shift note should be completed every shift.  The Outcome Evaluation is a brief statement about your assessment that the patient is improving, declining, or no change.  This information will be displayed automatically on your shift  note.  Outcome: Progressing  Flowsheets (Taken 8/13/2024 1120)  Plan of Care Reviewed With: patient  Overall Patient Progress: no change  Goal: Patient-Specific Goal (Individualized)  Description: You can add care plan individualizations to a care plan. Examples of Individualization might be:  \"Parent requests to be called daily at 9am for status\", \"I have a hard time hearing out of my right ear\", or \"Do not touch me to wake me up as it startles  me\".  Outcome: Progressing  Goal: Absence of Hospital-Acquired Illness or Injury  Outcome: Progressing  Intervention: Identify and Manage Fall Risk  Recent Flowsheet Documentation  Taken 8/13/2024 0755 by Ghazala Maxwell, RN  Safety Promotion/Fall Prevention:   activity supervised   assistive device/personal items within reach   clutter free " environment maintained   increased rounding and observation   mobility aid in reach   nonskid shoes/slippers when out of bed   patient and family education   room door open   room organization consistent   safety round/check completed   supervised activity  Intervention: Prevent Skin Injury  Recent Flowsheet Documentation  Taken 8/13/2024 0755 by Ghazala Maxwell RN  Body Position: position changed independently  Skin Protection:   adhesive use limited   incontinence pads utilized  Device Skin Pressure Protection:   absorbent pad utilized/changed   adhesive use limited   tubing/devices free from skin contact  Intervention: Prevent and Manage VTE (Venous Thromboembolism) Risk  Recent Flowsheet Documentation  Taken 8/13/2024 0755 by Ghazala Maxwell RN  VTE Prevention/Management:   SCDs off (sequential compression devices)   patient refused intervention  Intervention: Prevent Infection  Recent Flowsheet Documentation  Taken 8/13/2024 0755 by Ghazala Maxwell RN  Infection Prevention:   rest/sleep promoted   single patient room provided   personal protective equipment utilized   hand hygiene promoted  Goal: Optimal Comfort and Wellbeing  Outcome: Progressing  Intervention: Monitor Pain and Promote Comfort  Recent Flowsheet Documentation  Taken 8/13/2024 1109 by Ghazala Maxwell RN  Pain Management Interventions: declines  Taken 8/13/2024 0959 by Ghazala Maxwell RN  Pain Management Interventions: medication (see MAR)  Taken 8/13/2024 0835 by Ghazala Maxwell RN  Pain Management Interventions: declines  Taken 8/13/2024 0741 by Ghazala Maxwell RN  Pain Management Interventions:   medication (see MAR)   declines  Goal: Readiness for Transition of Care  Outcome: Progressing     Problem: Gas Exchange Impaired  Goal: Optimal Gas Exchange  Outcome: Progressing     Problem: Anemia  Goal: Anemia Symptom Improvement  Outcome: Progressing  Intervention: Monitor and Manage Anemia  Recent Flowsheet Documentation  Taken 8/13/2024 0755 by Hans  FLORESITA Vivar  Safety Promotion/Fall Prevention:   activity supervised   assistive device/personal items within reach   clutter free environment maintained   increased rounding and observation   mobility aid in reach   nonskid shoes/slippers when out of bed   patient and family education   room door open   room organization consistent   safety round/check completed   supervised activity     Problem: Pain Acute  Goal: Optimal Pain Control and Function  Outcome: Progressing  Intervention: Develop Pain Management Plan  Recent Flowsheet Documentation  Taken 8/13/2024 1109 by Ghazala Maxwell RN  Pain Management Interventions: declines  Taken 8/13/2024 0959 by Ghazala Maxwell RN  Pain Management Interventions: medication (see MAR)  Taken 8/13/2024 0835 by Ghazala Maxwell RN  Pain Management Interventions: declines  Taken 8/13/2024 0741 by Ghazala Maxwell RN  Pain Management Interventions:   medication (see MAR)   declines  Intervention: Prevent or Manage Pain  Recent Flowsheet Documentation  Taken 8/13/2024 0755 by Ghazala Maxwell RN  Bowel Elimination Promotion: adequate fluid intake promoted  Medication Review/Management: medications reviewed  Intervention: Optimize Psychosocial Wellbeing  Recent Flowsheet Documentation  Taken 8/13/2024 0755 by Ghazala Maxwell RN  Supportive Measures:   active listening utilized   decision-making supported   problem-solving facilitated   self-care encouraged

## 2024-08-13 NOTE — PLAN OF CARE
"Pt is alert and oriented. Pt denies any pain or shortness of breath and on room air.    1 unit of blood transfused for hgb of 6.8; Q8hrs hgb check; redraw this AM.    No BM on this shift; no signs /symptoms of bleeding.    Protonix infusing at 10ml/hr, Ongoing monitoring.    Tele: Afib CVR with occasional PVC's.    Plan: Discharge TBD.    BP (!) 164/94   Pulse 89   Temp 98.3  F (36.8  C) (Temporal)   Resp 16   Ht 1.727 m (5' 7.99\")   Wt 68.7 kg (151 lb 5.8 oz)   SpO2 91%   BMI 23.02 kg/m       Problem: Adult Inpatient Plan of Care  Goal: Plan of Care Review  Description: The Plan of Care Review/Shift note should be completed every shift.  The Outcome Evaluation is a brief statement about your assessment that the patient is improving, declining, or no change.  This information will be displayed automatically on your shift  note.  Outcome: Progressing  Flowsheets (Taken 8/13/2024 0640)  Outcome Evaluation: 1 unit of blood infussed for hgb of 6.8. Q8hrs Hgb check.  Plan of Care Reviewed With: patient  Overall Patient Progress: no change  Goal: Patient-Specific Goal (Individualized)  Description: You can add care plan individualizations to a care plan. Examples of Individualization might be:  \"Parent requests to be called daily at 9am for status\", \"I have a hard time hearing out of my right ear\", or \"Do not touch me to wake me up as it startles  me\".  Outcome: Progressing  Goal: Absence of Hospital-Acquired Illness or Injury  Outcome: Progressing  Intervention: Identify and Manage Fall Risk  Recent Flowsheet Documentation  Taken 8/13/2024 0600 by Arelis Schofield RN  Safety Promotion/Fall Prevention: safety round/check completed  Taken 8/13/2024 0500 by Arelis Schofield RN  Safety Promotion/Fall Prevention: safety round/check completed  Taken 8/13/2024 0400 by Arelis Schofield RN  Safety Promotion/Fall Prevention: safety round/check completed  Taken 8/13/2024 0300 by Arelis Schofield RN  Safety Promotion/Fall " Prevention: safety round/check completed  Taken 8/13/2024 0200 by Arelis Scohfield RN  Safety Promotion/Fall Prevention: safety round/check completed  Taken 8/13/2024 0100 by Arelis Schofield RN  Safety Promotion/Fall Prevention:   safety round/check completed   lighting adjusted   clutter free environment maintained   activity supervised   assistive device/personal items within reach   room near nurse's station   patient and family education  Taken 8/13/2024 0000 by Arelis Schofield RN  Safety Promotion/Fall Prevention: safety round/check completed  Taken 8/12/2024 2300 by Arelis Schofield RN  Safety Promotion/Fall Prevention: safety round/check completed  Intervention: Prevent and Manage VTE (Venous Thromboembolism) Risk  Recent Flowsheet Documentation  Taken 8/13/2024 0100 by Arelis Schofield RN  VTE Prevention/Management: SCDs off (sequential compression devices)  Intervention: Prevent Infection  Recent Flowsheet Documentation  Taken 8/13/2024 0100 by Arelis Schofield RN  Infection Prevention:   single patient room provided   rest/sleep promoted   hand hygiene promoted  Goal: Optimal Comfort and Wellbeing  Outcome: Progressing  Goal: Readiness for Transition of Care  Outcome: Progressing     Problem: Gas Exchange Impaired  Goal: Optimal Gas Exchange  Outcome: Progressing     Problem: Anemia  Goal: Anemia Symptom Improvement  Outcome: Progressing  Intervention: Monitor and Manage Anemia  Recent Flowsheet Documentation  Taken 8/13/2024 0600 by Arelis Schofield RN  Safety Promotion/Fall Prevention: safety round/check completed  Taken 8/13/2024 0500 by Arelis Schofield RN  Safety Promotion/Fall Prevention: safety round/check completed  Taken 8/13/2024 0400 by Arelis Schofield RN  Safety Promotion/Fall Prevention: safety round/check completed  Taken 8/13/2024 0300 by Arelis Schofield RN  Safety Promotion/Fall Prevention: safety round/check completed  Taken 8/13/2024 0200 by Arelis Schofield RN  Safety  Promotion/Fall Prevention: safety round/check completed  Taken 8/13/2024 0100 by Arelis Schofield RN  Safety Promotion/Fall Prevention:   safety round/check completed   lighting adjusted   clutter free environment maintained   activity supervised   assistive device/personal items within reach   room near nurse's station   patient and family education  Taken 8/13/2024 0000 by Arelis Schofield RN  Safety Promotion/Fall Prevention: safety round/check completed  Taken 8/12/2024 2300 by Arelis Schofield RN  Safety Promotion/Fall Prevention: safety round/check completed     Problem: Pain Acute  Goal: Optimal Pain Control and Function  Outcome: Progressing  Intervention: Prevent or Manage Pain  Recent Flowsheet Documentation  Taken 8/13/2024 0100 by Arelis Schofield RN  Medication Review/Management: medications reviewed   Goal Outcome Evaluation:      Plan of Care Reviewed With: patient    Overall Patient Progress: no changeOverall Patient Progress: no change    Outcome Evaluation: 1 unit of blood infussed for hgb of 6.8. Q8hrs Hgb check.

## 2024-08-13 NOTE — PLAN OF CARE
"CARE FROM [id number]    BP (!) 161/98 (BP Location: Left arm)   Pulse 76   Temp 98.2  F (36.8  C) (Temporal)   Resp 16   Ht 1.727 m (5' 7.99\")   Wt 68.7 kg (151 lb 5.8 oz)   SpO2 97%   BMI 23.02 kg/m      Goal Outcome Evaluation:      Plan of Care Reviewed With: patient    Overall Patient Progress: no changeOverall Patient Progress: no change    Outcome Evaluation: Sleepy/lethargic, flat affect. VSS, besides hypertensive. RA. Up w/ A1 gbw. Pain managed w/ sched tramadol and PRN tyl-- effective per pt report. CLD. Q8h hgb, orders to transfuse if less than 7. Still unable to replace PIV, Protonix gtt paused. Incontinent of urine, brief checks/changes. Bed alarm on. Door open.    Problem: Adult Inpatient Plan of Care  Goal: Plan of Care Review  Description: The Plan of Care Review/Shift note should be completed every shift.  The Outcome Evaluation is a brief statement about your assessment that the patient is improving, declining, or no change.  This information will be displayed automatically on your shift  note.  Outcome: Progressing  Flowsheets (Taken 8/12/2024 2220)  Outcome Evaluation: Sleepy/lethargic, flat affect. VSS, besides hypertensive. RA. Up w/ A1 gbw. Pain managed w/ sched tramadol and PRN tyl-- effective per pt report. CLD. Q8h hgb, orders to transfuse if less than 7. Still unable to replace PIV, Protonix gtt paused. Incontinent of urine, brief checks/changes. Bed alarm on. Door open.  Plan of Care Reviewed With: patient  Overall Patient Progress: no change  Goal: Patient-Specific Goal (Individualized)  Description: You can add care plan individualizations to a care plan. Examples of Individualization might be:  \"Parent requests to be called daily at 9am for status\", \"I have a hard time hearing out of my right ear\", or \"Do not touch me to wake me up as it startles  me\".  Outcome: Progressing  Goal: Absence of Hospital-Acquired Illness or Injury  Outcome: Progressing  Intervention: Identify and " Manage Fall Risk  Recent Flowsheet Documentation  Taken 8/12/2024 2000 by Antonina Woodard, RN  Safety Promotion/Fall Prevention:   activity supervised   clutter free environment maintained   increase visualization of patient   nonskid shoes/slippers when out of bed   patient and family education   room door open   room near nurse's station   safety round/check completed   treat reversible contributory factors   treat underlying cause  Intervention: Prevent Infection  Recent Flowsheet Documentation  Taken 8/12/2024 2000 by Antonina Woodard, RN  Infection Prevention:   hand hygiene promoted   personal protective equipment utilized   rest/sleep promoted   single patient room provided  Goal: Optimal Comfort and Wellbeing  Outcome: Progressing  Intervention: Monitor Pain and Promote Comfort  Recent Flowsheet Documentation  Taken 8/12/2024 1932 by Antonina Woodard, RN  Pain Management Interventions: medication (see MAR)  Goal: Readiness for Transition of Care  Outcome: Progressing

## 2024-08-13 NOTE — DISCHARGE SUMMARY
Hospitalist Discharge Summary  [facility]    Zack Booth MRN# [id number]   YOB: 1940 Age: 84 year old     Date of Admission:  8/11/2024  Date of Discharge:  8/13/2024  1:29 PM  Admitting Physician:  Zhao Hendricks MD  Discharge Physician:  Cheo Dunn DO  Discharging Service:  Hospitalist     Primary Provider: Lissy, [facility]          Discharge Diagnosis:     Hematochezia/Melena  Hx of PUD  - Appreciate GI recommendations  - PPI bid  - Advance diet as tolerated  - EGD in 2/2024 showed gastritis, non-bleeding duodenal ulcers, 4 cm hiatal hernia     Acute Blood Loss Anemia  Anemia of Chronic Disease  - s/p 1 unit PRBC in ED, 1 unit overnight on 8/12  - Hgb q8h  - Transfuse for hgb less than 7  - Daily CBC     Chronic Kidney Disease Stage 3a  - Baseline Cr = 2.5  - Developed acute kidney failure in 3/2024 secondary to Keytruda  - Hold hydrochlorothiazide  - Continue prednisone 10 mg daily with bactrim prophylaxis     Chronic Medical Problems:  Metastatic Lung Cancer - no plans for further treatment at this time  Emphysema  Coronary Artery Disease  Hypertension  Hyperlipidemia  Paroxysmal Atrial Fibrillation - no longer on OAC             Discharge Disposition:     Discharged to home           Hospital Course:     Zack Booth is a 84 year old male with a history of peptic ulcer disease in 2/2024, anemia of chronic disease, chronic kidney disease stage IIIa secondary to Keytruda, metastatic lung cancer, emphysema, coronary artery disease, hypertension, hyperlipidemia, paroxysmal atrial fibrillation no longer on anticoagulation admitted on 8/11/2024 with hematochezia/melena.  In the emergency department, the patient was found to have a blood pressure 164/100, heart rate 80, temperature 97.6  F, respiratory rate 22, SpO2 90% on room air.  Initial lab work showed hemoglobin 6.9, WBC 12.2, BUN/creatinine 40.9/2.50, INR 0.95.  The patient was started on a  pantoprazole drip and transfused 1 unit of PRBC.  Gastroenterology was consulted to see the patient.  On 8/12, the patient underwent an upper endoscopy that showed a large hiatal hernia, small healed ulcer in the gastric antrum, large healed ulcer in the duodenal bulb with area of edema in the duodenum and no obvious signs of bleeding.  Options were discussed including advancing diet and repeating the CBC the following day versus discharge home and patient watching his symptoms at home.  Patient adamant about discharge home.  Recommend follow-up with primary care doctor later this week for repeat hemoglobin.     The patient was seen, examined, and counseled on this day. The hospitalization and plan of care was reviewed with the patient extensively. All questions were addressed and the patient agreed to follow-up as noted above.           Allergies:     Allergies   Allergen Reactions    Atenolol      Other reaction(s): Bradycardia    Lisinopril      Other reaction(s): *Unknown    Statins Muscle Pain (Myalgia)     Bad dreams    Ibuprofen      Other reaction(s): Headache              Discharge Medications:     Discharge Medication List as of 8/13/2024 12:44 PM        CONTINUE these medications which have NOT CHANGED    Details   acetaminophen (TYLENOL) 500 MG tablet Take 1,000 mg by mouth daily, Historical      amLODIPine (NORVASC) 5 MG tablet Take 5 mg by mouth daily, Historical      atorvastatin (LIPITOR) 20 MG tablet Take 20 mg by mouth daily, Historical      nitroGLYcerin (NITROSTAT) 0.4 MG sublingual tablet Place 0.4 mg under the tongue every 5 minutes as needed for chest pain For chest pain place 1 tablet under the tongue every 5 minutes for 3 doses. If symptoms persist 5 minutes after 1st dose call 911., Historical      omeprazole (PRILOSEC) 20 MG DR capsule Take 20 mg by mouth daily, Historical      predniSONE (DELTASONE) 10 MG tablet Take 10 mg by mouth daily, Historical      senna-docusate  "(SENOKOT-S/PERICOLACE) 8.6-50 MG tablet Take 1 tablet by mouth 2 times daily as needed, Historical      sulfamethoxazole-trimethoprim (BACTRIM) 400-80 MG tablet Take 1 tablet by mouth Every Mon, Wed, Fri Morning, Historical      traMADol (ULTRAM) 50 MG tablet Take 50 mg by mouth 2 times daily, Historical           STOP taking these medications       aspirin 81 MG EC tablet Comments:   Reason for Stopping:         pantoprazole (PROTONIX) 40 MG EC tablet Comments:   Reason for Stopping:                      Condition on Discharge:     Discharge condition: Fair   Discharge vitals: Blood pressure (!) 161/96, pulse 88, temperature 98.9  F (37.2  C), temperature source Temporal, resp. rate 16, height 1.727 m (5' 7.99\"), weight 68.7 kg (151 lb 5.8 oz), SpO2 92%.   Code status on discharge: DNR / DNI      BASIC PHYSICAL EXAMINATION:  GENERAL: No apparent distress.  CARDIOVASCULAR: Regular rate and rhythm without murmurs.  PULMONARY: Clear to auscultation bilaterally.   GASTROINTESTINAL: Abdomen soft, non-tender.  EXTREMITIES: No edema, pulses intact.  NEUROLOGIC: No focal deficits.            History of Illness:   See detailed admission note for full details.               Procedures excluding imaging which is summarized below:     Please see details in the electronic medical record.           Consultations:     GASTROENTEROLOGY IP CONSULT          Significant Results:     Results for orders placed or performed during the hospital encounter of 04/04/24   US Renal Complete Non-Vascular    Narrative    US RENAL COMPLETE NON-VASCULAR   4/5/2024 2:20 PM     HISTORY: Acute kidney injury    COMPARISON: CT 2/3/2024    FINDINGS:    Right Kidney: Normal size and cortical echogenicity measuring up to  10.4 cm. Multiple simple appearing cysts, no specific follow-up  recommended. No hydronephrosis.    Left Kidney: Normal size and cortical echogenicity measuring up to  10.0 cm. Multiple simple appearing cysts, no specific " follow-up  recommended. No hydronephrosis.    Bladder: Prostatomegaly with compression of the bladder base.  Otherwise unremarkable.      Impression    IMPRESSION:    1. No hydronephrosis.  2. Prostatomegaly.    MAGED MEDINA MD         SYSTEM ID:  [id number]       Transthoracic Echocardiogram Results:  No results found for this or any previous visit (from the past 4320 hour(s)).             Pending Results:     Unresulted Labs Ordered in the Past 30 Days of this Admission       No orders found from 7/12/2024 to 8/12/2024.                        Discharge Instructions and Follow-Up:     Discharge instructions and follow-up:   Discharge Procedure Orders   Reason for your hospital stay   Order Comments: GI Bleed, Acute Blood Loss Anemia     Activity   Order Comments: Your activity upon discharge: activity as tolerated     Order Specific Question Answer Comments   Is discharge order? Yes      Follow-up and recommended labs and tests    Order Comments: Follow up with primary care provider, [facility], within 2-3 days for hospital follow- up.  The following labs/tests are recommended: CBC, BMP.      Recommend you follow up with your Nephrologist regarding your prednisone dosing.    Recommend you take omeprazole once daily as long as you remain on prednisone.    Do not take your aspirin until you have a repeat CBC with your primary care doctor to ensure your blood counts are stable.     Diet   Order Comments: Follow this diet upon discharge: Orders Placed This Encounter      Regular Diet Adult     Order Specific Question Answer Comments   Is discharge order? Yes           Total time spent in face to face contact with the patient and coordinating discharge was:  35 Minutes    Cheo Dunn DO  [facility] Hospitalist  [address] [place] [address]  08/13/2024

## 2024-08-13 NOTE — PROGRESS NOTES
[facility]    Hospitalist Progress Note  Name: Zack Booth    MRN: [id number]  Provider:  Cheo Dunn DO  Date of Service: 08/13/2024    Summary of Stay: Zack Booth is a 84 year old male with a history of peptic ulcer disease in 2/2024, anemia of chronic disease, chronic kidney disease stage IIIa secondary to Keytruda, metastatic lung cancer, emphysema, coronary artery disease, hypertension, hyperlipidemia, paroxysmal atrial fibrillation no longer on anticoagulation admitted on 8/11/2024 with hematochezia/melena.  In the emergency department, the patient was found to have a blood pressure 164/100, heart rate 80, temperature 97.6  F, respiratory rate 22, SpO2 90% on room air.  Initial lab work showed hemoglobin 6.9, WBC 12.2, BUN/creatinine 40.9/2.50, INR 0.95.  The patient was started on a pantoprazole drip and transfused 1 unit of PRBC.  Gastroenterology was consulted to see the patient.  On 8/12, the patient underwent an upper endoscopy that showed a large hiatal hernia, small healed ulcer in the gastric antrum, large healed ulcer in the duodenal bulb with area of edema in the duodenum and no obvious signs of bleeding.  Options were discussed including advancing diet and repeating the CBC the following day versus discharge home and patient watching his symptoms at home.  Patient adamant about discharge home.  Recommend follow-up with primary care doctor later this week for repeat hemoglobin.    TODAY'S PLAN: Appreciate gastroenterology recommendations.  EGD yesterday showed healing gastric and duodenal ulcers with some edema in the first part of the duodenum.  No obvious source of bleeding.  Patient has not had a bowel movement since 8/11.  Patient did receive 1 unit of blood overnight with improvement in his hemoglobin to 8.7 today.  Possible this is a diverticular bleed.  Discussed the option of a colonoscopy if his hemoglobin drops further.  Patient is adamant that he would like  to go home.  Advanced to a regular diet.  Offered for patient to stay overnight to ensure he tolerates oral intake and to repeat a hemoglobin.  Patient declines this and is still adamant of going home today.  2 daughters and wife at bedside.  They would like the patient to stay and to consider a colonoscopy.  They are concerned that if it happens again at home they will need to call an ambulance again.  Patient still wanting to go home.  Plan for discharge home later today.  Son Kam will pick the patient up.  If the patient changes his mind and is agreeable to stay overnight, will plan for repeat hemoglobin in the morning.    Problem List:   Hematochezia/Melena  Hx of PUD  - Appreciate GI recommendations  - PPI bid  - Advance diet as tolerated  - EGD in 2/2024 showed gastritis, non-bleeding duodenal ulcers, 4 cm hiatal hernia     Acute Blood Loss Anemia  Anemia of Chronic Disease  - s/p 1 unit PRBC in ED, 1 unit overnight on 8/12  - Hgb q8h  - Transfuse for hgb less than 7  - Daily CBC     Chronic Kidney Disease Stage 3a  - Baseline Cr = 2.5  - Developed acute kidney failure in 3/2024 secondary to Keytruda  - Hold hydrochlorothiazide  - Continue prednisone 10 mg daily with bactrim prophylaxis     Chronic Medical Problems:  Metastatic Lung Cancer - no plans for further treatment at this time  Emphysema  Coronary Artery Disease  Hypertension  Hyperlipidemia  Paroxysmal Atrial Fibrillation - no longer on OAC    I spent 48 minutes in reviewing this patient's labs, imaging, medications, medical history.  In addition time was spent interviewing the patient, communicating with family, and medical decision making.      DVT Prophylaxis: Pneumatic Compression Devices  Code Status: No CPR- Do NOT Intubate  Diet: Regular Diet Adult    Gordon Catheter: Not present    Disposition: Medically Ready for Discharge: Anticipated Today  Goals to discharge include: son arrives to transport the patient  Family updated today: Yes       Interval History   Pt seen and examined.  Pt reports no BM overnight.  States he is not hungry.  Adamant that he discharge home.    -Data reviewed today: I personally reviewed all new labs and imaging results over the last 24 hours.     Physical Exam   Temp: 98.9  F (37.2  C) Temp src: Temporal BP: (!) 161/96 Pulse: 88   Resp: 16 SpO2: 92 % O2 Device: None (Room air)    Vitals:    08/11/24 2300   Weight: 68.7 kg (151 lb 5.8 oz)     Vital Signs with Ranges  Temp:  [97  F (36.1  C)-98.9  F (37.2  C)] 98.9  F (37.2  C)  Pulse:  [68-89] 88  Resp:  [12-26] 16  BP: (100-178)/(55-98) 161/96  SpO2:  [90 %-98 %] 92 %  I/O last 3 completed shifts:  In: 300   Out: 0     GENERAL: No apparent distress. Awake, alert, and fully oriented.  HEENT: Normocephalic, atraumatic. Extraocular movements intact.  CARDIOVASCULAR: Regular rate and rhythm without murmurs or rubs. No S3.  PULMONARY: Clear bilaterally.  GASTROINTESTINAL: Soft, non-tender, non-distended. Bowel sounds normoactive.   EXTREMITIES: No cyanosis or clubbing. No edema.  NEUROLOGICAL: CN 2-12 grossly intact, no focal neurological deficits.  DERMATOLOGICAL: No rash, ulcer, bruising, nor jaundice.    Medications   Current Facility-Administered Medications   Medication Dose Route Frequency Provider Last Rate Last Admin     Current Facility-Administered Medications   Medication Dose Route Frequency Provider Last Rate Last Admin    amLODIPine (NORVASC) tablet 5 mg  5 mg Oral Daily Zhao Hendricks MD   5 mg at 08/13/24 0741    pantoprazole (PROTONIX) EC tablet 40 mg  40 mg Oral BID AC Cheo Dunn DO   40 mg at 08/13/24 0908    predniSONE (DELTASONE) tablet 10 mg  10 mg Oral Daily Cheo Dunn, DO   10 mg at 08/13/24 0741    sodium chloride (PF) 0.9% PF flush 3 mL  3 mL Intracatheter Q8H Zhao Hendricks MD   3 mL at 08/12/24 0605    sulfamethoxazole-trimethoprim (BACTRIM) 400-80 MG per tablet 1 tablet  1 tablet Oral Q Mon Wed Fri AM Eladio,  "Zhao Chatman MD   1 tablet at 08/12/24 0916    traMADol (ULTRAM) tablet 50 mg  50 mg Oral BID Cheo Dunn DO   50 mg at 08/13/24 0741     Data     Laboratory:  Recent Labs   Lab 08/13/24  0703 08/12/24  2303 08/12/24  1350 08/11/24  1248 08/11/24  0758 08/11/24  0114   WBC 13.5*  --   --   --  10.8 12.2*   HGB 8.7* 6.8* 7.7*   < > 7.9* 6.9*   HCT 29.6*  --   --   --  25.8* 23.8*   MCV 95  --   --   --  91 93   *  --   --   --  170 180    < > = values in this interval not displayed.     Recent Labs   Lab 08/13/24  0703 08/12/24  0826 08/12/24  0229 08/11/24  0758 08/11/24  0114     --   --  141 142   POTASSIUM 4.3  --   --  4.0 4.4   CHLORIDE 106  --   --  107 109*   CO2 19*  --   --  21* 23   ANIONGAP 14  --   --  13 10   GLC 89 103* 80 91 95   BUN 42.3*  --   --  39.9* 40.9*   CR 2.30*  --   --  2.35* 2.50*   GFRESTIMATED 27*  --   --  26* 25*   KAUR 8.6*  --   --  8.2* 8.3*     No results for input(s): \"CULT\" in the last 168 hours.  No results found for: \"TROPI\"    Imaging:  No results found for this or any previous visit (from the past 24 hour(s)).      Cheo Dunn DO  [facility] Hospitalist  [address] [place] [address]  Securely message with [employer] (more info)  Text page via [employer] Paging/Directory   08/13/2024   "

## (undated) DEVICE — BAG CLEAR TRASH 1.3M 39X33" P4040C

## (undated) DEVICE — PROBE BIPOLAR HEMOSTASIS GOLD 10FRX210CM 6016 M00560160

## (undated) DEVICE — PAD CHUX UNDERPAD 30X36" P3036C

## (undated) DEVICE — SPECIMEN TRAP MUCOUS SIMILAC NTRL CARE GRAD 80ML 0045860

## (undated) DEVICE — TUBING SUCTION MEDI-VAC SOFT 3/16"X20' N520A

## (undated) DEVICE — SUCTION MANIFOLD NEPTUNE 2 SYS 4 PORT 0702-020-000

## (undated) DEVICE — CATH ESCP 220CM 7FR HEMOSPRAY HMST 2.8MM

## (undated) DEVICE — KIT PROCEDURE W/CLEAN-A-SCOPE LINERS V2 200800

## (undated) DEVICE — PROBE BIPOLAR HEMOSTASIS GOLD 07FRX210CM M00560150

## (undated) DEVICE — SYR 50ML SLIP TIP W/O NDL 309654

## (undated) DEVICE — SOL WATER IRRIG 1000ML BOTTLE 2F7114

## (undated) DEVICE — COVER FOOTSWITCH URO

## (undated) DEVICE — GOWN XLG DISP 9545

## (undated) DEVICE — COVER FOOTSWITCH W/CINCH 20X24" 923267